# Patient Record
Sex: FEMALE | URBAN - METROPOLITAN AREA
[De-identification: names, ages, dates, MRNs, and addresses within clinical notes are randomized per-mention and may not be internally consistent; named-entity substitution may affect disease eponyms.]

---

## 2018-11-07 ENCOUNTER — HOSPITAL ENCOUNTER (OUTPATIENT)
Dept: LAB | Age: 53
Discharge: HOME | End: 2018-11-07

## 2018-11-07 LAB
COMMENT, HOLDF: NORMAL
HAV IGM SER QL: NONREACTIVE
HBV CORE IGM SER QL: NONREACTIVE
HBV SURFACE AG SER QL: <0.1 INDEX
HBV SURFACE AG SER QL: NEGATIVE
HCV AB SERPL QL IA: NONREACTIVE
HCV COMMENT,HCGAC: NORMAL
HIV 1+2 AB+HIV1 P24 AG SERPL QL IA: NONREACTIVE
HIV12 RESULT COMMENT, HHIVC: NORMAL
SAMPLES BEING HELD,HOLD: NORMAL
SP1: NORMAL
SP2: NORMAL
SP3: NORMAL

## 2018-11-07 PROCEDURE — 80074 ACUTE HEPATITIS PANEL: CPT | Performed by: INTERNAL MEDICINE

## 2018-11-07 PROCEDURE — 87389 HIV-1 AG W/HIV-1&-2 AB AG IA: CPT | Performed by: INTERNAL MEDICINE

## 2019-04-26 ENCOUNTER — HOSPITAL ENCOUNTER (OUTPATIENT)
Dept: LAB | Age: 54
Discharge: HOME | End: 2019-04-26

## 2019-04-26 PROCEDURE — 88142 CYTOPATH C/V THIN LAYER: CPT

## 2019-04-26 PROCEDURE — 87624 HPV HI-RISK TYP POOLED RSLT: CPT

## 2019-06-24 ENCOUNTER — HOSPITAL ENCOUNTER (OUTPATIENT)
Dept: LAB | Age: 54
Discharge: HOME | End: 2019-06-24

## 2019-06-24 LAB
ANION GAP SERPL CALC-SCNC: 6 MMOL/L (ref 5–15)
BUN SERPL-MCNC: 7 MG/DL (ref 6–20)
BUN/CREAT SERPL: 10 (ref 12–20)
CALCIUM SERPL-MCNC: 8.8 MG/DL (ref 8.5–10.1)
CHLORIDE SERPL-SCNC: 94 MMOL/L (ref 97–108)
CO2 SERPL-SCNC: 28 MMOL/L (ref 21–32)
CREAT SERPL-MCNC: 0.67 MG/DL (ref 0.55–1.02)
GLUCOSE SERPL-MCNC: 86 MG/DL (ref 65–100)
POTASSIUM SERPL-SCNC: 4.4 MMOL/L (ref 3.5–5.1)
SODIUM SERPL-SCNC: 128 MMOL/L (ref 136–145)

## 2019-06-24 PROCEDURE — 80048 BASIC METABOLIC PNL TOTAL CA: CPT

## 2020-09-23 ENCOUNTER — HOSPITAL ENCOUNTER (OUTPATIENT)
Dept: LAB | Age: 55
Discharge: HOME | End: 2020-09-23

## 2020-09-23 PROCEDURE — 83036 HEMOGLOBIN GLYCOSYLATED A1C: CPT

## 2020-09-23 PROCEDURE — 85025 COMPLETE CBC W/AUTO DIFF WBC: CPT

## 2020-09-23 PROCEDURE — 82043 UR ALBUMIN QUANTITATIVE: CPT

## 2020-09-23 PROCEDURE — 80053 COMPREHEN METABOLIC PANEL: CPT

## 2020-09-23 PROCEDURE — 80061 LIPID PANEL: CPT

## 2020-09-24 LAB
ALBUMIN SERPL-MCNC: 4.1 G/DL (ref 3.5–5)
ALBUMIN/GLOB SERPL: 1.2 {RATIO} (ref 1.1–2.2)
ALP SERPL-CCNC: 81 U/L (ref 45–117)
ALT SERPL-CCNC: 26 U/L (ref 12–78)
ANION GAP SERPL CALC-SCNC: 4 MMOL/L (ref 5–15)
AST SERPL-CCNC: 17 U/L (ref 15–37)
BASOPHILS # BLD: 0.1 K/UL (ref 0–0.1)
BASOPHILS NFR BLD: 1 % (ref 0–1)
BILIRUB SERPL-MCNC: 0.3 MG/DL (ref 0.2–1)
BUN SERPL-MCNC: 8 MG/DL (ref 6–20)
BUN/CREAT SERPL: 12 (ref 12–20)
CALCIUM SERPL-MCNC: 9.1 MG/DL (ref 8.5–10.1)
CHLORIDE SERPL-SCNC: 97 MMOL/L (ref 97–108)
CHOLEST SERPL-MCNC: 186 MG/DL
CO2 SERPL-SCNC: 30 MMOL/L (ref 21–32)
CREAT SERPL-MCNC: 0.67 MG/DL (ref 0.55–1.02)
CREAT UR-MCNC: 226 MG/DL
DIFFERENTIAL METHOD BLD: ABNORMAL
EOSINOPHIL # BLD: 0.1 K/UL (ref 0–0.4)
EOSINOPHIL NFR BLD: 1 % (ref 0–7)
ERYTHROCYTE [DISTWIDTH] IN BLOOD BY AUTOMATED COUNT: 13.8 % (ref 11.5–14.5)
EST. AVERAGE GLUCOSE BLD GHB EST-MCNC: 103 MG/DL
GLOBULIN SER CALC-MCNC: 3.5 G/DL (ref 2–4)
GLUCOSE SERPL-MCNC: 125 MG/DL (ref 65–100)
HBA1C MFR BLD: 5.2 % (ref 4–5.6)
HCT VFR BLD AUTO: 42.5 % (ref 35–47)
HDLC SERPL-MCNC: 78 MG/DL
HDLC SERPL: 2.4 {RATIO} (ref 0–5)
HGB BLD-MCNC: 13.7 G/DL (ref 11.5–16)
IMM GRANULOCYTES # BLD AUTO: 0 K/UL (ref 0–0.04)
IMM GRANULOCYTES NFR BLD AUTO: 0 % (ref 0–0.5)
LDLC SERPL CALC-MCNC: 92.6 MG/DL (ref 0–100)
LIPID PROFILE,FLP: NORMAL
LYMPHOCYTES # BLD: 2.2 K/UL (ref 0.8–3.5)
LYMPHOCYTES NFR BLD: 37 % (ref 12–49)
MCH RBC QN AUTO: 32.9 PG (ref 26–34)
MCHC RBC AUTO-ENTMCNC: 32.2 G/DL (ref 30–36.5)
MCV RBC AUTO: 101.9 FL (ref 80–99)
MICROALBUMIN UR-MCNC: 7.85 MG/DL
MICROALBUMIN/CREAT UR-RTO: 35 MG/G (ref 0–30)
MONOCYTES # BLD: 0.7 K/UL (ref 0–1)
MONOCYTES NFR BLD: 12 % (ref 5–13)
NEUTS SEG # BLD: 2.9 K/UL (ref 1.8–8)
NEUTS SEG NFR BLD: 49 % (ref 32–75)
NRBC # BLD: 0 K/UL (ref 0–0.01)
NRBC BLD-RTO: 0 PER 100 WBC
PLATELET # BLD AUTO: 300 K/UL (ref 150–400)
PMV BLD AUTO: 10.4 FL (ref 8.9–12.9)
POTASSIUM SERPL-SCNC: 3.8 MMOL/L (ref 3.5–5.1)
PROT SERPL-MCNC: 7.6 G/DL (ref 6.4–8.2)
RBC # BLD AUTO: 4.17 M/UL (ref 3.8–5.2)
SODIUM SERPL-SCNC: 131 MMOL/L (ref 136–145)
TRIGL SERPL-MCNC: 77 MG/DL (ref ?–150)
VLDLC SERPL CALC-MCNC: 15.4 MG/DL
WBC # BLD AUTO: 6 K/UL (ref 3.6–11)

## 2021-06-16 PROCEDURE — 85025 COMPLETE CBC W/AUTO DIFF WBC: CPT

## 2021-06-16 PROCEDURE — 82977 ASSAY OF GGT: CPT

## 2021-06-16 PROCEDURE — 80076 HEPATIC FUNCTION PANEL: CPT

## 2021-06-16 PROCEDURE — 80048 BASIC METABOLIC PNL TOTAL CA: CPT

## 2021-06-17 ENCOUNTER — HOSPITAL ENCOUNTER (OUTPATIENT)
Dept: LAB | Age: 56
Discharge: HOME | End: 2021-06-17

## 2021-06-17 LAB
ALBUMIN SERPL-MCNC: 4.6 G/DL (ref 3.5–5)
ALBUMIN/GLOB SERPL: 1.3 {RATIO} (ref 1.1–2.2)
ALP SERPL-CCNC: 112 U/L (ref 45–117)
ALT SERPL-CCNC: 24 U/L (ref 12–78)
ANION GAP SERPL CALC-SCNC: 8 MMOL/L (ref 5–15)
AST SERPL-CCNC: 25 U/L (ref 15–37)
BASOPHILS # BLD: 0.1 K/UL (ref 0–0.1)
BASOPHILS NFR BLD: 1 % (ref 0–1)
BILIRUB DIRECT SERPL-MCNC: 0.3 MG/DL (ref 0–0.2)
BILIRUB SERPL-MCNC: 0.9 MG/DL (ref 0.2–1)
BUN SERPL-MCNC: 8 MG/DL (ref 6–20)
BUN/CREAT SERPL: 15 (ref 12–20)
CALCIUM SERPL-MCNC: 10.2 MG/DL (ref 8.5–10.1)
CHLORIDE SERPL-SCNC: 91 MMOL/L (ref 97–108)
CO2 SERPL-SCNC: 30 MMOL/L (ref 21–32)
COMMENT, HOLDF: NORMAL
CREAT SERPL-MCNC: 0.53 MG/DL (ref 0.55–1.02)
DIFFERENTIAL METHOD BLD: ABNORMAL
EOSINOPHIL # BLD: 0 K/UL (ref 0–0.4)
EOSINOPHIL NFR BLD: 0 % (ref 0–7)
ERYTHROCYTE [DISTWIDTH] IN BLOOD BY AUTOMATED COUNT: 13.1 % (ref 11.5–14.5)
GGT SERPL-CCNC: 23 U/L (ref 5–55)
GLOBULIN SER CALC-MCNC: 3.6 G/DL (ref 2–4)
GLUCOSE SERPL-MCNC: 91 MG/DL (ref 65–100)
HCT VFR BLD AUTO: 45.6 % (ref 35–47)
HGB BLD-MCNC: 14.9 G/DL (ref 11.5–16)
IMM GRANULOCYTES # BLD AUTO: 0 K/UL (ref 0–0.04)
IMM GRANULOCYTES NFR BLD AUTO: 0 % (ref 0–0.5)
LYMPHOCYTES # BLD: 2.2 K/UL (ref 0.8–3.5)
LYMPHOCYTES NFR BLD: 30 % (ref 12–49)
MCH RBC QN AUTO: 33.3 PG (ref 26–34)
MCHC RBC AUTO-ENTMCNC: 32.7 G/DL (ref 30–36.5)
MCV RBC AUTO: 101.8 FL (ref 80–99)
MONOCYTES # BLD: 0.6 K/UL (ref 0–1)
MONOCYTES NFR BLD: 8 % (ref 5–13)
NEUTS SEG # BLD: 4.4 K/UL (ref 1.8–8)
NEUTS SEG NFR BLD: 61 % (ref 32–75)
NRBC # BLD: 0 K/UL (ref 0–0.01)
NRBC BLD-RTO: 0 PER 100 WBC
PLATELET # BLD AUTO: 308 K/UL (ref 150–400)
PMV BLD AUTO: 11.5 FL (ref 8.9–12.9)
POTASSIUM SERPL-SCNC: 4.5 MMOL/L (ref 3.5–5.1)
PROT SERPL-MCNC: 8.2 G/DL (ref 6.4–8.2)
RBC # BLD AUTO: 4.48 M/UL (ref 3.8–5.2)
SAMPLES BEING HELD,HOLD: NORMAL
SODIUM SERPL-SCNC: 129 MMOL/L (ref 136–145)
WBC # BLD AUTO: 7.3 K/UL (ref 3.6–11)

## 2021-09-20 ENCOUNTER — HOSPITAL ENCOUNTER (OUTPATIENT)
Dept: LAB | Age: 56
Discharge: HOME | End: 2021-09-20

## 2021-09-20 PROCEDURE — 83036 HEMOGLOBIN GLYCOSYLATED A1C: CPT

## 2021-09-20 PROCEDURE — 80061 LIPID PANEL: CPT

## 2021-09-20 PROCEDURE — 82570 ASSAY OF URINE CREATININE: CPT

## 2021-09-20 PROCEDURE — 80048 BASIC METABOLIC PNL TOTAL CA: CPT

## 2021-09-21 LAB
ANION GAP SERPL CALC-SCNC: 4 MMOL/L (ref 5–15)
BUN SERPL-MCNC: 9 MG/DL (ref 6–20)
BUN/CREAT SERPL: 13 (ref 12–20)
CALCIUM SERPL-MCNC: 9.3 MG/DL (ref 8.5–10.1)
CHLORIDE SERPL-SCNC: 94 MMOL/L (ref 97–108)
CHOLEST SERPL-MCNC: 195 MG/DL
CO2 SERPL-SCNC: 28 MMOL/L (ref 21–32)
COMMENT, HOLDF: NORMAL
CREAT SERPL-MCNC: 0.69 MG/DL (ref 0.55–1.02)
CREAT UR-MCNC: 151 MG/DL
EST. AVERAGE GLUCOSE BLD GHB EST-MCNC: 108 MG/DL
GLUCOSE SERPL-MCNC: 125 MG/DL (ref 65–100)
HBA1C MFR BLD: 5.4 % (ref 4–5.6)
HDLC SERPL-MCNC: 96 MG/DL
HDLC SERPL: 2 {RATIO} (ref 0–5)
LDLC SERPL CALC-MCNC: 87.2 MG/DL (ref 0–100)
MICROALBUMIN UR-MCNC: 4.09 MG/DL
MICROALBUMIN/CREAT UR-RTO: 27 MG/G (ref 0–30)
POTASSIUM SERPL-SCNC: 4 MMOL/L (ref 3.5–5.1)
SAMPLES BEING HELD,HOLD: NORMAL
SODIUM SERPL-SCNC: 126 MMOL/L (ref 136–145)
TRIGL SERPL-MCNC: 59 MG/DL (ref ?–150)
VLDLC SERPL CALC-MCNC: 11.8 MG/DL

## 2021-10-25 ENCOUNTER — HOSPITAL ENCOUNTER (OUTPATIENT)
Dept: LAB | Age: 56
Discharge: HOME | End: 2021-10-25

## 2021-10-25 LAB
ALBUMIN SERPL-MCNC: 4.5 G/DL (ref 3.5–5)
ALBUMIN/GLOB SERPL: 1.1 {RATIO} (ref 1.1–2.2)
ALP SERPL-CCNC: 137 U/L (ref 45–117)
ALT SERPL-CCNC: 25 U/L (ref 12–78)
ANION GAP SERPL CALC-SCNC: 7 MMOL/L (ref 5–15)
AST SERPL-CCNC: 28 U/L (ref 15–37)
BILIRUB DIRECT SERPL-MCNC: 0.3 MG/DL (ref 0–0.2)
BILIRUB SERPL-MCNC: 0.9 MG/DL (ref 0.2–1)
BUN SERPL-MCNC: 7 MG/DL (ref 6–20)
BUN/CREAT SERPL: 11 (ref 12–20)
CALCIUM SERPL-MCNC: 9.6 MG/DL (ref 8.5–10.1)
CHLORIDE SERPL-SCNC: 89 MMOL/L (ref 97–108)
CO2 SERPL-SCNC: 28 MMOL/L (ref 21–32)
COMMENT, HOLDF: NORMAL
CORTIS SERPL-MCNC: 17.4 UG/DL
CREAT SERPL-MCNC: 0.61 MG/DL (ref 0.55–1.02)
GLOBULIN SER CALC-MCNC: 4 G/DL (ref 2–4)
GLUCOSE SERPL-MCNC: 113 MG/DL (ref 65–100)
OSMOLALITY UR: 461 MOSM/KG H2O
POTASSIUM SERPL-SCNC: 4.1 MMOL/L (ref 3.5–5.1)
PROT SERPL-MCNC: 8.5 G/DL (ref 6.4–8.2)
SAMPLES BEING HELD,HOLD: NORMAL
SODIUM SERPL-SCNC: 124 MMOL/L (ref 136–145)
SODIUM UR-SCNC: 113 MMOL/L
TSH SERPL DL<=0.05 MIU/L-ACNC: 0.84 UIU/ML (ref 0.36–3.74)

## 2021-10-25 PROCEDURE — 80076 HEPATIC FUNCTION PANEL: CPT

## 2021-10-25 PROCEDURE — 84443 ASSAY THYROID STIM HORMONE: CPT

## 2021-10-25 PROCEDURE — 83935 ASSAY OF URINE OSMOLALITY: CPT

## 2021-10-25 PROCEDURE — 80048 BASIC METABOLIC PNL TOTAL CA: CPT

## 2021-10-25 PROCEDURE — 84300 ASSAY OF URINE SODIUM: CPT

## 2021-10-25 PROCEDURE — 82533 TOTAL CORTISOL: CPT

## 2022-01-20 ENCOUNTER — HOSPITAL ENCOUNTER (OUTPATIENT)
Dept: LAB | Age: 57
Discharge: HOME | End: 2022-01-20

## 2022-01-20 LAB
ANION GAP SERPL CALC-SCNC: 4 MMOL/L (ref 5–15)
BUN SERPL-MCNC: 5 MG/DL (ref 6–20)
BUN/CREAT SERPL: 11 (ref 12–20)
CALCIUM SERPL-MCNC: 9.1 MG/DL (ref 8.5–10.1)
CHLORIDE SERPL-SCNC: 97 MMOL/L (ref 97–108)
CO2 SERPL-SCNC: 29 MMOL/L (ref 21–32)
CREAT SERPL-MCNC: 0.44 MG/DL (ref 0.55–1.02)
GLUCOSE SERPL-MCNC: 90 MG/DL (ref 65–100)
POTASSIUM SERPL-SCNC: 3.9 MMOL/L (ref 3.5–5.1)
SODIUM SERPL-SCNC: 130 MMOL/L (ref 136–145)

## 2022-01-20 PROCEDURE — 80048 BASIC METABOLIC PNL TOTAL CA: CPT

## 2023-02-06 ENCOUNTER — HOSPITAL ENCOUNTER (INPATIENT)
Age: 58
LOS: 14 days | Discharge: HOME | DRG: 190 | End: 2023-02-21
Attending: EMERGENCY MEDICINE | Admitting: STUDENT IN AN ORGANIZED HEALTH CARE EDUCATION/TRAINING PROGRAM

## 2023-02-06 ENCOUNTER — APPOINTMENT (OUTPATIENT)
Dept: GENERAL RADIOLOGY | Age: 58
DRG: 190 | End: 2023-02-06
Attending: EMERGENCY MEDICINE

## 2023-02-06 DIAGNOSIS — E87.1 HYPONATREMIA: ICD-10-CM

## 2023-02-06 DIAGNOSIS — J44.1 CHRONIC OBSTRUCTIVE PULMONARY DISEASE WITH ACUTE EXACERBATION (HCC): Primary | ICD-10-CM

## 2023-02-06 LAB
ALBUMIN SERPL-MCNC: 3 G/DL (ref 3.5–5)
ALBUMIN/GLOB SERPL: 0.9 (ref 1.1–2.2)
ALP SERPL-CCNC: 132 U/L (ref 45–117)
ALT SERPL-CCNC: 28 U/L (ref 12–78)
ANION GAP SERPL CALC-SCNC: 5 MMOL/L (ref 5–15)
AST SERPL-CCNC: 37 U/L (ref 15–37)
BASOPHILS # BLD: 0.1 K/UL (ref 0–0.1)
BASOPHILS NFR BLD: 2 % (ref 0–1)
BILIRUB SERPL-MCNC: 0.8 MG/DL (ref 0.2–1)
BNP SERPL-MCNC: 1160 PG/ML
BUN SERPL-MCNC: 5 MG/DL (ref 6–20)
BUN/CREAT SERPL: 12 (ref 12–20)
CALCIUM SERPL-MCNC: 8.3 MG/DL (ref 8.5–10.1)
CHLORIDE SERPL-SCNC: 80 MMOL/L (ref 97–108)
CO2 SERPL-SCNC: 33 MMOL/L (ref 21–32)
COMMENT, HOLDF: NORMAL
CREAT SERPL-MCNC: 0.42 MG/DL (ref 0.55–1.02)
DIFFERENTIAL METHOD BLD: ABNORMAL
EOSINOPHIL # BLD: 0.1 K/UL (ref 0–0.4)
EOSINOPHIL NFR BLD: 2 % (ref 0–7)
ERYTHROCYTE [DISTWIDTH] IN BLOOD BY AUTOMATED COUNT: 13.9 % (ref 11.5–14.5)
GLOBULIN SER CALC-MCNC: 3.5 G/DL (ref 2–4)
GLUCOSE SERPL-MCNC: 86 MG/DL (ref 65–100)
HCT VFR BLD AUTO: 39.7 % (ref 35–47)
HGB BLD-MCNC: 13.8 G/DL (ref 11.5–16)
IMM GRANULOCYTES # BLD AUTO: 0 K/UL (ref 0–0.04)
IMM GRANULOCYTES NFR BLD AUTO: 0 % (ref 0–0.5)
LYMPHOCYTES # BLD: 0.8 K/UL (ref 0.8–3.5)
LYMPHOCYTES NFR BLD: 23 % (ref 12–49)
MCH RBC QN AUTO: 32.1 PG (ref 26–34)
MCHC RBC AUTO-ENTMCNC: 34.8 G/DL (ref 30–36.5)
MCV RBC AUTO: 92.3 FL (ref 80–99)
MONOCYTES # BLD: 0.3 K/UL (ref 0–1)
MONOCYTES NFR BLD: 8 % (ref 5–13)
NEUTS BAND NFR BLD MANUAL: 2 %
NEUTS SEG # BLD: 2.1 K/UL (ref 1.8–8)
NEUTS SEG NFR BLD: 63 % (ref 32–75)
NRBC # BLD: 0 K/UL (ref 0–0.01)
NRBC BLD-RTO: 0 PER 100 WBC
PLATELET # BLD AUTO: 147 K/UL (ref 150–400)
PMV BLD AUTO: 9.8 FL (ref 8.9–12.9)
POTASSIUM SERPL-SCNC: 3.5 MMOL/L (ref 3.5–5.1)
PROT SERPL-MCNC: 6.5 G/DL (ref 6.4–8.2)
RBC # BLD AUTO: 4.3 M/UL (ref 3.8–5.2)
RBC MORPH BLD: ABNORMAL
SAMPLES BEING HELD,HOLD: NORMAL
SODIUM SERPL-SCNC: 118 MMOL/L (ref 136–145)
TROPONIN I SERPL HS-MCNC: 12 NG/L (ref 0–51)
WBC # BLD AUTO: 3.4 K/UL (ref 3.6–11)

## 2023-02-06 PROCEDURE — 71045 X-RAY EXAM CHEST 1 VIEW: CPT

## 2023-02-06 PROCEDURE — 96374 THER/PROPH/DIAG INJ IV PUSH: CPT

## 2023-02-06 PROCEDURE — 80053 COMPREHEN METABOLIC PANEL: CPT

## 2023-02-06 PROCEDURE — 36415 COLL VENOUS BLD VENIPUNCTURE: CPT

## 2023-02-06 PROCEDURE — 94761 N-INVAS EAR/PLS OXIMETRY MLT: CPT

## 2023-02-06 PROCEDURE — 74011250636 HC RX REV CODE- 250/636

## 2023-02-06 PROCEDURE — 99285 EMERGENCY DEPT VISIT HI MDM: CPT

## 2023-02-06 PROCEDURE — 85025 COMPLETE CBC W/AUTO DIFF WBC: CPT

## 2023-02-06 PROCEDURE — 96375 TX/PRO/DX INJ NEW DRUG ADDON: CPT

## 2023-02-06 PROCEDURE — 93005 ELECTROCARDIOGRAM TRACING: CPT

## 2023-02-06 PROCEDURE — 84484 ASSAY OF TROPONIN QUANT: CPT

## 2023-02-06 PROCEDURE — 96361 HYDRATE IV INFUSION ADD-ON: CPT

## 2023-02-06 PROCEDURE — 83880 ASSAY OF NATRIURETIC PEPTIDE: CPT

## 2023-02-06 PROCEDURE — 94640 AIRWAY INHALATION TREATMENT: CPT

## 2023-02-06 PROCEDURE — 74011000250 HC RX REV CODE- 250

## 2023-02-06 RX ORDER — LORAZEPAM 2 MG/ML
1 INJECTION INTRAMUSCULAR
Status: COMPLETED | OUTPATIENT
Start: 2023-02-06 | End: 2023-02-06

## 2023-02-06 RX ORDER — IPRATROPIUM BROMIDE AND ALBUTEROL SULFATE 2.5; .5 MG/3ML; MG/3ML
10 SOLUTION RESPIRATORY (INHALATION)
Status: COMPLETED | OUTPATIENT
Start: 2023-02-06 | End: 2023-02-06

## 2023-02-06 RX ADMIN — LORAZEPAM 1 MG: 2 INJECTION INTRAMUSCULAR; INTRAVENOUS at 23:51

## 2023-02-06 RX ADMIN — IPRATROPIUM BROMIDE AND ALBUTEROL SULFATE 10 ML: 2.5; .5 SOLUTION RESPIRATORY (INHALATION) at 22:45

## 2023-02-06 RX ADMIN — SODIUM CHLORIDE 1000 ML: 9 INJECTION, SOLUTION INTRAVENOUS at 23:36

## 2023-02-06 RX ADMIN — METHYLPREDNISOLONE SODIUM SUCCINATE 125 MG: 125 INJECTION, POWDER, FOR SOLUTION INTRAMUSCULAR; INTRAVENOUS at 22:44

## 2023-02-06 NOTE — Clinical Note
Patient Class[de-identified] OBSERVATION [104]   Type of Bed: Telemetry [19]   Cardiac Monitoring Required?: Yes   Reason for Observation: acute respiratory distress   Admitting Diagnosis: Acute respiratory distress [802374]   Admitting Physician: Sera Bullock [[id number]]   Attending Physician: Ines Murdock

## 2023-02-07 ENCOUNTER — APPOINTMENT (OUTPATIENT)
Dept: CT IMAGING | Age: 58
DRG: 190 | End: 2023-02-07
Attending: INTERNAL MEDICINE

## 2023-02-07 PROBLEM — E87.1 HYPONATREMIA: Status: ACTIVE | Noted: 2023-02-07

## 2023-02-07 PROBLEM — R06.03 ACUTE RESPIRATORY DISTRESS: Status: ACTIVE | Noted: 2023-02-07

## 2023-02-07 LAB
ANION GAP SERPL CALC-SCNC: 6 MMOL/L (ref 5–15)
ANION GAP SERPL CALC-SCNC: 6 MMOL/L (ref 5–15)
ATRIAL RATE: 95 BPM
BUN SERPL-MCNC: 5 MG/DL (ref 6–20)
BUN SERPL-MCNC: 6 MG/DL (ref 6–20)
BUN/CREAT SERPL: 11 (ref 12–20)
BUN/CREAT SERPL: 9 (ref 12–20)
CALCIUM SERPL-MCNC: 7.4 MG/DL (ref 8.5–10.1)
CALCIUM SERPL-MCNC: 7.9 MG/DL (ref 8.5–10.1)
CALCULATED P AXIS, ECG09: 71 DEGREES
CALCULATED R AXIS, ECG10: -154 DEGREES
CALCULATED T AXIS, ECG11: 71 DEGREES
CHLORIDE SERPL-SCNC: 87 MMOL/L (ref 97–108)
CHLORIDE SERPL-SCNC: 89 MMOL/L (ref 97–108)
CHLORIDE UR-SCNC: 19 MMOL/L
CO2 SERPL-SCNC: 28 MMOL/L (ref 21–32)
CO2 SERPL-SCNC: 29 MMOL/L (ref 21–32)
CREAT SERPL-MCNC: 0.53 MG/DL (ref 0.55–1.02)
CREAT SERPL-MCNC: 0.54 MG/DL (ref 0.55–1.02)
CREAT UR-MCNC: 39.1 MG/DL
DIAGNOSIS, 93000: NORMAL
GLUCOSE SERPL-MCNC: 162 MG/DL (ref 65–100)
GLUCOSE SERPL-MCNC: 164 MG/DL (ref 65–100)
MAGNESIUM SERPL-MCNC: 1.7 MG/DL (ref 1.6–2.4)
MAGNESIUM SERPL-MCNC: 1.7 MG/DL (ref 1.6–2.4)
OSMOLALITY SERPL: 256 MOSM/KG H2O
OSMOLALITY UR: 163 MOSM/KG H2O
P-R INTERVAL, ECG05: 184 MS
PHOSPHATE SERPL-MCNC: 3.1 MG/DL (ref 2.6–4.7)
PHOSPHATE SERPL-MCNC: 3.2 MG/DL (ref 2.6–4.7)
POTASSIUM SERPL-SCNC: 3.6 MMOL/L (ref 3.5–5.1)
POTASSIUM SERPL-SCNC: 4.2 MMOL/L (ref 3.5–5.1)
POTASSIUM UR-SCNC: 30 MMOL/L
Q-T INTERVAL, ECG07: 364 MS
QRS DURATION, ECG06: 76 MS
QTC CALCULATION (BEZET), ECG08: 457 MS
SODIUM SERPL-SCNC: 121 MMOL/L (ref 136–145)
SODIUM SERPL-SCNC: 124 MMOL/L (ref 136–145)
SODIUM UR-SCNC: 9 MMOL/L
TROPONIN I SERPL HS-MCNC: 8 NG/L (ref 0–51)
VENTRICULAR RATE, ECG03: 95 BPM

## 2023-02-07 PROCEDURE — 74011000636 HC RX REV CODE- 636: Performed by: INTERNAL MEDICINE

## 2023-02-07 PROCEDURE — 80048 BASIC METABOLIC PNL TOTAL CA: CPT

## 2023-02-07 PROCEDURE — 82570 ASSAY OF URINE CREATININE: CPT

## 2023-02-07 PROCEDURE — 94640 AIRWAY INHALATION TREATMENT: CPT

## 2023-02-07 PROCEDURE — 74011250636 HC RX REV CODE- 250/636: Performed by: NURSE PRACTITIONER

## 2023-02-07 PROCEDURE — 84100 ASSAY OF PHOSPHORUS: CPT

## 2023-02-07 PROCEDURE — 82436 ASSAY OF URINE CHLORIDE: CPT

## 2023-02-07 PROCEDURE — 84484 ASSAY OF TROPONIN QUANT: CPT

## 2023-02-07 PROCEDURE — 74011000250 HC RX REV CODE- 250: Performed by: NURSE PRACTITIONER

## 2023-02-07 PROCEDURE — 74011250637 HC RX REV CODE- 250/637: Performed by: NURSE PRACTITIONER

## 2023-02-07 PROCEDURE — 65270000046 HC RM TELEMETRY

## 2023-02-07 PROCEDURE — 36415 COLL VENOUS BLD VENIPUNCTURE: CPT

## 2023-02-07 PROCEDURE — 71275 CT ANGIOGRAPHY CHEST: CPT

## 2023-02-07 PROCEDURE — 83930 ASSAY OF BLOOD OSMOLALITY: CPT

## 2023-02-07 PROCEDURE — 83735 ASSAY OF MAGNESIUM: CPT

## 2023-02-07 PROCEDURE — 84300 ASSAY OF URINE SODIUM: CPT

## 2023-02-07 PROCEDURE — 83935 ASSAY OF URINE OSMOLALITY: CPT

## 2023-02-07 PROCEDURE — 84133 ASSAY OF URINE POTASSIUM: CPT

## 2023-02-07 RX ORDER — ACETAMINOPHEN 325 MG/1
650 TABLET ORAL
Status: DISCONTINUED | OUTPATIENT
Start: 2023-02-07 | End: 2023-02-21 | Stop reason: HOSPADM

## 2023-02-07 RX ORDER — LORAZEPAM 2 MG/ML
1 INJECTION INTRAMUSCULAR
Status: DISCONTINUED | OUTPATIENT
Start: 2023-02-07 | End: 2023-02-21 | Stop reason: HOSPADM

## 2023-02-07 RX ORDER — ONDANSETRON 2 MG/ML
4 INJECTION INTRAMUSCULAR; INTRAVENOUS
Status: DISCONTINUED | OUTPATIENT
Start: 2023-02-07 | End: 2023-02-21 | Stop reason: HOSPADM

## 2023-02-07 RX ORDER — BALSAM PERU/CASTOR OIL
OINTMENT (GRAM) TOPICAL 2 TIMES DAILY
Status: DISCONTINUED | OUTPATIENT
Start: 2023-02-07 | End: 2023-02-21 | Stop reason: HOSPADM

## 2023-02-07 RX ORDER — IPRATROPIUM BROMIDE 0.5 MG/2.5ML
0.5 SOLUTION RESPIRATORY (INHALATION)
Status: DISCONTINUED | OUTPATIENT
Start: 2023-02-07 | End: 2023-02-21 | Stop reason: HOSPADM

## 2023-02-07 RX ORDER — ACETAMINOPHEN 650 MG/1
650 SUPPOSITORY RECTAL
Status: DISCONTINUED | OUTPATIENT
Start: 2023-02-07 | End: 2023-02-21 | Stop reason: HOSPADM

## 2023-02-07 RX ORDER — ENOXAPARIN SODIUM 100 MG/ML
40 INJECTION SUBCUTANEOUS DAILY
Status: DISCONTINUED | OUTPATIENT
Start: 2023-02-07 | End: 2023-02-11

## 2023-02-07 RX ORDER — LORAZEPAM 2 MG/ML
2 INJECTION INTRAMUSCULAR
Status: DISCONTINUED | OUTPATIENT
Start: 2023-02-07 | End: 2023-02-21 | Stop reason: HOSPADM

## 2023-02-07 RX ORDER — POLYETHYLENE GLYCOL 3350 17 G/17G
17 POWDER, FOR SOLUTION ORAL DAILY PRN
Status: DISCONTINUED | OUTPATIENT
Start: 2023-02-07 | End: 2023-02-21 | Stop reason: HOSPADM

## 2023-02-07 RX ORDER — SODIUM CHLORIDE 0.9 % (FLUSH) 0.9 %
5-40 SYRINGE (ML) INJECTION EVERY 8 HOURS
Status: DISCONTINUED | OUTPATIENT
Start: 2023-02-07 | End: 2023-02-21 | Stop reason: HOSPADM

## 2023-02-07 RX ORDER — ONDANSETRON 4 MG/1
4 TABLET, ORALLY DISINTEGRATING ORAL
Status: DISCONTINUED | OUTPATIENT
Start: 2023-02-07 | End: 2023-02-21 | Stop reason: HOSPADM

## 2023-02-07 RX ORDER — SODIUM CHLORIDE 0.9 % (FLUSH) 0.9 %
5-40 SYRINGE (ML) INJECTION AS NEEDED
Status: DISCONTINUED | OUTPATIENT
Start: 2023-02-07 | End: 2023-02-21 | Stop reason: HOSPADM

## 2023-02-07 RX ORDER — LEVALBUTEROL INHALATION SOLUTION 1.25 MG/3ML
1.25 SOLUTION RESPIRATORY (INHALATION)
Status: DISCONTINUED | OUTPATIENT
Start: 2023-02-07 | End: 2023-02-14

## 2023-02-07 RX ORDER — IBUPROFEN 200 MG
1 TABLET ORAL DAILY
Status: DISCONTINUED | OUTPATIENT
Start: 2023-02-07 | End: 2023-02-21 | Stop reason: HOSPADM

## 2023-02-07 RX ADMIN — LORAZEPAM 1 MG: 2 INJECTION INTRAMUSCULAR at 11:55

## 2023-02-07 RX ADMIN — IOPAMIDOL 100 ML: 755 INJECTION, SOLUTION INTRAVENOUS at 09:38

## 2023-02-07 RX ADMIN — ENOXAPARIN SODIUM 40 MG: 100 INJECTION SUBCUTANEOUS at 10:08

## 2023-02-07 RX ADMIN — LEVALBUTEROL HYDROCHLORIDE 1.25 MG: 1.25 SOLUTION RESPIRATORY (INHALATION) at 22:39

## 2023-02-07 RX ADMIN — LORAZEPAM 2 MG: 2 INJECTION INTRAMUSCULAR at 22:28

## 2023-02-07 RX ADMIN — SODIUM CHLORIDE, PRESERVATIVE FREE 10 ML: 5 INJECTION INTRAVENOUS at 22:28

## 2023-02-07 RX ADMIN — SODIUM CHLORIDE, PRESERVATIVE FREE 10 ML: 5 INJECTION INTRAVENOUS at 14:00

## 2023-02-07 NOTE — H&P
Hospitalist Admission Note    NAME: Nimesh Rangel   :  1965   MRN:  [id number]     Date/Time:  2023 12:51 AM    Patient PCP: None  ______________________________________________________________________  Given the patient's current clinical presentation, I have a high level of concern for decompensation if discharged from the emergency department. Complex decision making was performed, which includes reviewing the patient's available past medical records, laboratory results, and x-ray films. Discussed assessment of this patient's clinical condition and plan of care with Dr. Blue Maynard which is as follows. Assessment / Plan:    Acute respiratory distress  COPD ( not in exacerbation)  CXR: No acute process  ECG: Normal sinus rhythm. Right superior axis deviation   Anterior infarct , age undetermined  Pro BNP: 1,160  - O2 and neb treatment PRN    Hyponatremia  - monitor Na q 4 hours x 4 occurences  - check serum osm  - Nephrology consult    Nicotine abuse  - Nicotine patch q daily  - smoking cessation education    Alcohol abuse  - CIWA protocol      Code Status: Full  Surrogate Decision Maker:    DVT Prophylaxis: Lovenox  GI Prophylaxis: not indicated    Baseline:       Subjective:   CHIEF COMPLAINT: Acute SOB    HISTORY OF PRESENT ILLNESS:     Nimesh Rangel is a 61 y.o.  female who presents with SOB that started around 3 pm. EMS was called and per EMS report, patient was drinking and smoking when they arrived. Documented O2 sat was 89%% on RA and she was placed on NC 2 L. Patient denied any other symptoms including fever, chills, headache, CP, nausea, vomiting, bowel and bladder habit changes. Patient has history of COPD and was unable to have recent medication refill including her inhaler. She was also trying to cut down on her alcohol intake but reportedly still drinks 2 40 oz beer on a daily basis.  Patient has just received Ativan per CIWA protocol close to the time of assessment and was too sleepy to answer assessment questions. Most of the information was taken form ED and EMS documentation. We were asked to admit for work up and evaluation of the above problems. Past medical History  COPD  ETOH abuse  Nicotine abuse    No past surgical history on file. Social History     Tobacco Use    Smoking status: Not on file    Smokeless tobacco: Not on file   Substance Use Topics    Alcohol use: Not on file        No family history on file. No Known Allergies     Prior to Admission medications    Not on File       REVIEW OF SYSTEMS:     I am not able to complete the review of systems because:    The patient is intubated and sedated   Y The patient has altered mental status due to his acute medical problems    The patient has baseline aphasia from prior stroke(s)    The patient has baseline dementia and is not reliable historian    The patient is in acute medical distress and unable to provide information           Total of 12 systems reviewed as follows:       POSITIVE= bolded text  Negative = text not bolded  General:  fever, chills, sweats, generalized weakness, weight loss/gain,      loss of appetite   Eyes:    blurred vision, eye pain, loss of vision, double vision  ENT:    rhinorrhea, pharyngitis   Respiratory:   cough, sputum production, SOB, JARAMILLO, wheezing, pleuritic pain   Cardiology:   chest pain, palpitations, orthopnea, PND, edema, syncope   Gastrointestinal:  abdominal pain , N/V, diarrhea, dysphagia, constipation, bleeding   Genitourinary:  frequency, urgency, dysuria, hematuria, incontinence   Muskuloskeletal :  arthralgia, myalgia, back pain  Hematology:  easy bruising, nose or gum bleeding, lymphadenopathy   Dermatological: rash, ulceration, pruritis, color change / jaundice  Endocrine:   hot flashes or polydipsia   Neurological:  headache, dizziness, confusion, focal weakness, paresthesia,     Speech difficulties, memory loss, gait difficulty  Psychological: Feelings of anxiety, depression, agitation    Objective:   VITALS:    Visit Vitals  BP (!) 94/55   Pulse (!) 106   Temp 97.9 °F (36.6 °C)   Resp 17   Ht 5' 3\" (1.6 m)   Wt 54 kg (119 lb)   SpO2 91%   BMI 21.08 kg/m²       PHYSICAL EXAM:    General:    Drowsy (recent Ativan for CIWA score), no distress, appears older than stated age. HEENT: Atraumatic, anicteric sclerae, pink conjunctivae     No oral ulcers, mucosa moist, throat clear, dentition fair  Neck:  Supple, symmetrical,  thyroid: non tender  Lungs:   Clear to auscultation bilaterally. No Wheezing or Rhonchi. Scattered rales. Chest wall:  No tenderness  No Accessory muscle use. Heart:   Regular  rhythm,  No  murmur   No edema  Abdomen:   Soft, non-tender. Not distended. Bowel sounds normal  Extremities: No cyanosis. No clubbing,  +2 peripheral LE edema    Skin turgor normal, Capillary refill normal, Radial dial pulse 2+  Skin:     Not pale. Not Jaundiced  No rashes   Psych:  Unable to assess  Neurologic: Unable to assess    _______________________________________________________________________  Care Plan discussed with:    Comments   Patient y    Family      RN y    Care Manager                    Consultant:      _______________________________________________________________________  Expected  Disposition:   Home with Family y   HH/PT/OT/RN    SNF/LTC    FAITH    ______________________________________________________      Comments    y Reviewed previous records   >50% of visit spent in counseling and coordination of care y Discussion with patient and/or family and questions answered       ________________________________________________________________________  Signed: Darion Shannon NP    Procedures: see electronic medical records for all procedures/Xrays and details which were not copied into this note but were reviewed prior to creation of Plan.     LAB DATA REVIEWED:    Recent Results (from the past 24 hour(s))   EKG, 12 LEAD, INITIAL    Collection Time: 02/06/23  9:58 PM   Result Value Ref Range    Ventricular Rate 95 BPM    Atrial Rate 95 BPM    P-R Interval 184 ms    QRS Duration 76 ms    Q-T Interval 364 ms    QTC Calculation (Bezet) 457 ms    Calculated P Axis 71 degrees    Calculated R Axis -154 degrees    Calculated T Axis 71 degrees    Diagnosis       Normal sinus rhythm  Right superior axis deviation  Anterior infarct , age undetermined  No previous ECGs available     CBC WITH AUTOMATED DIFF    Collection Time: 02/06/23 10:09 PM   Result Value Ref Range    WBC 3.4 (L) 3.6 - 11.0 K/uL    RBC 4.30 3.80 - 5.20 M/uL    HGB 13.8 11.5 - 16.0 g/dL    HCT 39.7 35.0 - 47.0 %    MCV 92.3 80.0 - 99.0 FL    MCH 32.1 26.0 - 34.0 PG    MCHC 34.8 30.0 - 36.5 g/dL    RDW 13.9 11.5 - 14.5 %    PLATELET 328 (L) 818 - 400 K/uL    MPV 9.8 8.9 - 12.9 FL    NRBC 0.0 0  WBC    ABSOLUTE NRBC 0.00 0.00 - 0.01 K/uL    NEUTROPHILS 63 32 - 75 %    BAND NEUTROPHILS 2 %    LYMPHOCYTES 23 12 - 49 %    MONOCYTES 8 5 - 13 %    EOSINOPHILS 2 0 - 7 %    BASOPHILS 2 (H) 0 - 1 %    IMMATURE GRANULOCYTES 0 0.0 - 0.5 %    ABS. NEUTROPHILS 2.1 1.8 - 8.0 K/UL    ABS. LYMPHOCYTES 0.8 0.8 - 3.5 K/UL    ABS. MONOCYTES 0.3 0.0 - 1.0 K/UL    ABS. EOSINOPHILS 0.1 0.0 - 0.4 K/UL    ABS. BASOPHILS 0.1 0.0 - 0.1 K/UL    ABS. IMM.  GRANS. 0.0 0.00 - 0.04 K/UL    DF MANUAL      RBC COMMENTS NORMOCYTIC, NORMOCHROMIC     METABOLIC PANEL, COMPREHENSIVE    Collection Time: 02/06/23 10:09 PM   Result Value Ref Range    Sodium 118 (LL) 136 - 145 mmol/L    Potassium 3.5 3.5 - 5.1 mmol/L    Chloride 80 (L) 97 - 108 mmol/L    CO2 33 (H) 21 - 32 mmol/L    Anion gap 5 5 - 15 mmol/L    Glucose 86 65 - 100 mg/dL    BUN 5 (L) 6 - 20 MG/DL    Creatinine 0.42 (L) 0.55 - 1.02 MG/DL    BUN/Creatinine ratio 12 12 - 20      eGFR >60 >60 ml/min/1.73m2    Calcium 8.3 (L) 8.5 - 10.1 MG/DL    Bilirubin, total 0.8 0.2 - 1.0 MG/DL    ALT (SGPT) 28 12 - 78 U/L    AST (SGOT) 37 15 - 37 U/L Alk. phosphatase 132 (H) 45 - 117 U/L    Protein, total 6.5 6.4 - 8.2 g/dL    Albumin 3.0 (L) 3.5 - 5.0 g/dL    Globulin 3.5 2.0 - 4.0 g/dL    A-G Ratio 0.9 (L) 1.1 - 2.2     TROPONIN-HIGH SENSITIVITY    Collection Time: 02/06/23 10:09 PM   Result Value Ref Range    Troponin-High Sensitivity 12 0 - 51 ng/L   NT-PRO BNP    Collection Time: 02/06/23 10:09 PM   Result Value Ref Range    NT pro-BNP 1,160 (H) <125 PG/ML   SAMPLES BEING HELD    Collection Time: 02/06/23 10:09 PM   Result Value Ref Range    SAMPLES BEING HELD 1 BLUE     COMMENT        Add-on orders for these samples will be processed based on acceptable specimen integrity and analyte stability, which may vary by analyte.

## 2023-02-07 NOTE — PROGRESS NOTES
Incontinence care provided. Patient taken to [facility] with tele box.

## 2023-02-07 NOTE — PROGRESS NOTES
Patient seen and examined today. Patient was admitted by night hospitalist team earlier this morning    Hyponatremia  Acute alcoholism, concern for withdrawal  Shortness of breath    Check CTA chest  On alcohol withdrawal protocol  Sodium is improving. Renal consulted.   Check labs in a.m. tomorrow

## 2023-02-07 NOTE — PROGRESS NOTES
Transition of Care Plan:    RUR:6% low risk for readmission   Disposition: Home   If SNF or IPR: Date FOC offered:  Date FOC received:  Date authorization started with reference number:  Date authorization received and expires:  Accepting facility:  Follow up appointments: PCP, Specialists if indicated  DME needed: Potential need for O2 challenge  Transportation at Discharge: Friend vs need for medical transport - [address] (pt could not recall today)  Keys or means to access home: Has access to home         Medicare Letter: N/A   Is patient a [place] and connected with the [place]? N/A              If yes, was [employer] transfer form completed and [facility] notified? Caregiver Contact: Pt's friend - Denise Anderson [phone]  Discharge Caregiver contacted prior to discharge? To be contacted if pt wishes    Care Conference needed?:   No                Reason for Admission:  Acute respiratory distress, COPD, hyponatremia                      RUR Score:  6% low risk for readmission                    Plan for utilizing home health:  No plans for home health at this time         PCP: First and Last name:  None      Name of Practice:    Are you a current patient: Yes/No:    Approximate date of last visit:    Can you participate in a virtual visit with your PCP:                     Current Advanced Directive/Advance Care Plan: Full Code  Advance Care Planning     Healthcare Decision Maker: Pt does not have advanced medical directive. AMD education provided and advised of opportunities for completion during hospitalization if desired. Today we discussed Healthcare Decision Makers. The patient is considering options. Transition of Care Plan:    Home    Initial note: CM reviewed chart. CM completed assessment with pt at bedside. CM introduced self, role of CM, verified demographics, and discussed transition of care planning. Pt independent at baseline, uses no O2, uses inhaler for COPD.  Pt resides with her mother, who she reports is handicapped. She states that she is her mother's caregiver, and that her mother is currently at [facility]. She asked CM to contact this facility as they were planning to discharge her mother today. CM obtained information and placed call to discharge planner at [facility]. No return call to date. Pt cannot remember home address at this time, states home is off of [address] and address on file is incorrect. Will need to confirm. She states that they will only reside in this house until March, as home was in [facility] and has been auctioned. Pt reports high stress levels which are contributing to increased alcohol consumption. Pt does not drive, reports depending on her close friend [facility] [employer] for transportation needs. Pt previously following with provider at [facility], but states that she has not seen this doctor in some time. Pt has no insurance, is agreeable to Medicaid screening. Screening request emailed to [address] [place] [address]. Care management will continue to be available to assist as transition of care planning needs arise. Care Management Interventions  PCP Verified by CM: Yes  Palliative Care Criteria Met (RRAT>21 & CHF Dx)?: No  Mode of Transport at Discharge: Other (see comment) (Anticipate need for medical transport)  Transition of Care Consult (CM Consult): Discharge Planning  Discharge Durable Medical Equipment: No (Uses inhaler, no O2 use, no DME use)  Physical Therapy Consult: No  Occupational Therapy Consult: No  Speech Therapy Consult: No  Support Systems: Friend/Neighbor (Pt is her mother's caregiver. She has 2 brothers, but pt does not endorse their support.  Pt endorses support from close friend, [facility])  Confirm Follow Up Transport: Friends (Friend, [employer] [facility])  The Patient and/or Patient Representative was Provided with a Choice of Provider and Agrees with the Discharge Plan?: Yes  Discharge Location  Patient Expects to be Discharged to[de-identified] [address] El[facility], [address]

## 2023-02-07 NOTE — CONSULTS
[facility] Consult Note        NAME: Rosas Ying       :  1965       MRN:  [id number]     Date/Time: 2023    Risk of deterioration: medium       Assessment:    Plan:  Acute on chronic hyponatremia  ETOH  HTN  Psych  Mucous plugging/sob Sodium marla 118  Sodium 122 today  Add neutrophos  Nacl tabs--reviewed urine indices-she would respond to ivf-will continue salt tabe for now  Etoh cessation  I have reviewed records from [facility]-has been seen by nephrology there in past-working diagnosis etoh/increased water intake  No need for hot salt at this time    CTA (-) PE       Asked to see for hyponatremia in the setting of sob/hx of etoh use. Chart reviewed. Pt interviewed  Very thirsty on rounds earlier today. Have reviewed [facility] records. Subjective:     Chief Complaint:  I'm thirsty    Review of Systems: no n/v/cp/f/c  (+) cough/sob  (-) dysuria    Objective:     VITALS:   Last 24hrs VS reviewed since prior progress note. Most recent are:  Visit Vitals  BP (!) 138/95   Pulse (!) 114   Temp 98.2 °F (36.8 °C)   Resp 18   Ht 5' 3\" (1.6 m)   Wt 54 kg (119 lb)   SpO2 98%   BMI 21.08 kg/m²     SpO2 Readings from Last 6 Encounters:   23 98%    O2 Flow Rate (L/min): 2 l/min     Intake/Output Summary (Last 24 hours) at 2023 1334  Last data filed at 2023 0631  Gross per 24 hour   Intake --   Output 500 ml   Net -500 ml        Telemetry Reviewed       PHYSICAL EXAM:    General   well developed, chronically ill appearing wf  EENT  Normocephalic, Atraumatic,  EOMI, sclera clear, nares clear, pharynx normal  Respiratory   Clear To Auscultation bilaterally  Cardiology  Regular Rate and Rythmn    Abdominal  Soft, non-tender, non-distended, positive bowel sounds  Extremities  No clubbing, cyanosis, or edema. Pulses intact.               Lab Data Reviewed: (see below)    Medications Reviewed: (see below)    PMH/SH reviewed - no change compared to H&P________________    Attending Physician: Shoshana Kovacs MD ____________________________________________________  MEDICATIONS:  Current Facility-Administered Medications   Medication Dose Route Frequency    sodium chloride soluble tablet 1,000 mg  1 g Oral BID    albuterol-ipratropium (DUO-NEB) 2.5 MG-0.5 MG/3 ML  3 mL Nebulization Q4H PRN    arformoterol 15 mcg/budesonide 0.5 mg neb solution   Nebulization BID RT    predniSONE (DELTASONE) tablet 40 mg  40 mg Oral DAILY WITH BREAKFAST    therapeutic multivitamin (THERAGRAN) tablet 1 Tablet  1 Tablet Oral DAILY    folic acid (FOLVITE) tablet 1 mg  1 mg Oral DAILY    thiamine mononitrate (B-1) tablet 100 mg  100 mg Oral DAILY    potassium, sodium phosphates (NEUTRA-PHOS) packet 2 Packet  2 Packet Oral ONCE    sodium chloride (NS) flush 5-40 mL  5-40 mL IntraVENous Q8H    sodium chloride (NS) flush 5-40 mL  5-40 mL IntraVENous PRN    acetaminophen (TYLENOL) tablet 650 mg  650 mg Oral Q6H PRN    Or    acetaminophen (TYLENOL) suppository 650 mg  650 mg Rectal Q6H PRN    polyethylene glycol (MIRALAX) packet 17 g  17 g Oral DAILY PRN    ondansetron (ZOFRAN ODT) tablet 4 mg  4 mg Oral Q8H PRN    Or    ondansetron (ZOFRAN) injection 4 mg  4 mg IntraVENous Q6H PRN    enoxaparin (LOVENOX) injection 40 mg  40 mg SubCUTAneous DAILY    ipratropium (ATROVENT) 0.02 % nebulizer solution 0.5 mg  0.5 mg Nebulization Q4H PRN    levalbuterol (XOPENEX) nebulizer soln 1.25 mg/3 mL  1.25 mg Nebulization Q4H PRN    LORazepam (ATIVAN) injection 1 mg  1 mg IntraVENous Q2H PRN    LORazepam (ATIVAN) injection 2 mg  2 mg IntraVENous Q2H PRN    nicotine (NICODERM CQ) 21 mg/24 hr patch 1 Patch  1 Patch TransDERmal DAILY    balsam peru-castor oiL (VENELEX) ointment   Topical BID        LABS:  Recent Labs     02/08/23  0258 02/06/23  2209   WBC 4.3 3.4*   HGB 12.9 13.8   HCT 37.9 39.7   * 147*     Recent Labs     02/08/23  0258 02/07/23  0748 02/07/23  0512   * 124* 121*   K 3.8 3.6 4.2   CL 84* 89* 87*   CO2 30 29 28   BUN 6 6 5*   CREA 0.42* 0.54* 0.53*   GLU 75 162* 164*   CA 8.6 7.4* 7.9*   MG  --   --  1.7  1.7   PHOS  --   --  3.2  3.1     Recent Labs     02/06/23  2209   ALT 28   *   TBILI 0.8   TP 6.5   ALB 3.0*   GLOB 3.5     No results for input(s): INR, PTP, APTT, INREXT in the last 72 hours. No results for input(s): FE, TIBC, PSAT, FERR in the last 72 hours. No results for input(s): PH, PCO2, PO2 in the last 72 hours. No results for input(s): CPK, CKNDX, TROIQ in the last 72 hours.     No lab exists for component: CPKMB  No results found for: Darius Mccall

## 2023-02-07 NOTE — PROGRESS NOTES
1329 - Report received. Care of patient assumed. 1551 - TRANSFER - OUT REPORT:    Verbal report given to roland RN(name) on Liat Polo  being transferred to [facility](unit) for routine progression of care       Report consisted of patients Situation, Background, Assessment and   Recommendations(SBAR). Information from the following report(s) SBAR, Kardex, Procedure Summary, Intake/Output, MAR, Accordion, Recent Results, and Med Rec Status was reviewed with the receiving nurse. Lines:   Peripheral IV Left Antecubital (Active)        Opportunity for questions and clarification was provided.       Patient transported with:   [employer]

## 2023-02-07 NOTE — ED NOTES
0130: Post ativan, pt is very lethargic and hard to respond. MD at bedside and notified about BP and mentation  0530: Pt cleaned up and provided fresh linen at this time. D/T urge incontinence, pt placed on purwick.    0600: Pt ambulated to bathroom with assistance at this time

## 2023-02-07 NOTE — DISCHARGE INSTRUCTIONS
Patient Follow Up Instructions: Activity: Activity as tolerated  Diet: DIET ONE TIME MESSAGE  ADULT ORAL NUTRITION SUPPLEMENT Breakfast, Dinner; Standard High Calorie/High Protein  ADULT DIET Regular; 1200 ml; Safety Tray; Safety Tray (Disposables)  Wound Care: None needed  Follow-up with PCP in  1 week. Follow-up tests/labs BMP in one week  If you have any concerns that you feel you need to come back to the hospital, please do not hesitate. albuterol and ipratropium (inhalation)  Pronunciation:  minnie DURON ter ol and PEG MOTTA pee um  Brand:  Combivent Respimat  What is the most important information I should know about albuterol and ipratropium inhalation? Seek medical attention if your breathing problems get worse quickly, or if you think your medications are not working as well. Overuse of albuterol and ipratropium may increase the risk of death. It is critical that you use only the prescribed dose of this medicine. What is albuterol and ipratropium inhalation? Albuterol and ipratropium are bronchodilators that relax muscles in the airways and increase air flow to the lungs. Albuterol and ipratropium inhalation is used to prevent bronchospasm in people with chronic obstructive pulmonary disease (COPD) who are also using other medicines to control their condition. Albuterol and ipratropium inhalation may also be used for purposes not listed in this medication guide. What should I discuss with my healthcare provider before using albuterol and ipratropium inhalation? You should not use this medicine if you are allergic to albuterol, ipratropium, or atropine. Tell your doctor if you have ever had:  heart disease, high blood pressure, coronary artery disease, or heart rhythm disorder;  a seizure disorder such as epilepsy;  diabetes;  overactive thyroid;  glaucoma;  liver or kidney disease; or  enlarged prostate, problems with urination. Tell your doctor if you are pregnant or breastfeeding.   This medicine is not approved for use by anyone younger than 25years old. How should I use albuterol and ipratropium inhalation? Follow all directions on your prescription label and read all medication guides or instruction sheets. Use the medicine exactly as directed. Albuterol and ipratropium inhalation is usually used 4 times per day. Follow your doctor's dosing instructions very carefully. Do not use more than 6 inhalations in a 24-hour period. Overuse of this medicine may increase the risk of death. It is critical that you use only the prescribed dose of this medicine. Read and carefully follow any Instructions for Use provided with your medicine. Ask your doctor or pharmacist if you do not understand these instructions. Always use the new inhaler device provided with your refill. To use the inhaler: You do not need to shake the inhaler before use. Uncap the mouthpiece of the inhaler. Breathe out fully. Put the mouthpiece into your mouth and close your lips. Keep your eyes closed to prevent spraying any medicine into your eyes. Breathe in slowly while pressing the dose-release button on the inhaler. Hold your breath for 10 seconds, then breathe out slowly. Close the cap until you use your inhaler again. Carefully follow all directions for cleaning your specific inhaler device once per week. Keep track of the number of sprays you have used. Throw away the Combivent Respimat inhaler canister after 3 months or 120 sprays, whichever comes first.  To use the solution with a nebulizer:  Open the foil pouch and remove one vial. Empty the medicine into the chamber of the nebulizer. Attach the mouthpiece or face mask, then attach the drug chamber to the compressor. Sit upright in a comfortable position. Place the mouthpiece into your mouth or put on the face mask, covering your nose and mouth. Turn on the compressor.   Breathe in slowly and evenly until no more mist is formed by the nebulizer and the drug chamber is empty. Clean the nebulizer after each use. Follow the cleaning directions that came with your nebulizer. Seek medical attention if your breathing problems get worse quickly, or if you think your medications are not working as well. To make sure this medicine is not causing harmful effects on your lungs, you may need to have chest X rays or other frequent lung function tests. Store at room temperature away from moisture, heat, and light. Do not freeze. Keep each vial in its foil pouch until you are ready to use it. Keep the cover on your inhaler when not in use. Keep away from open flame or high heat. The canister may explode if it gets too hot. Do not puncture or burn an empty inhaler canister. What happens if I miss a dose? Use the medicine as soon as you can, but skip the missed dose if it is almost time for your next dose. Do not use two doses at one time. Get your prescription refilled before you run out of medicine completely. What happens if I overdose? Seek emergency medical attention or call the Poison Help line at 1-253.584.5234. An overdose of albuterol and ipratropium can be fatal. Overdose symptoms may include chest pain, fast or pounding heartbeats, tremors, dry mouth, extreme thirst, muscle weakness or limp feeling, severe headache, pounding in your neck or ears, or feeling like you might pass out. What should I avoid while using albuterol and ipratropium inhalation? If this medication gets in your eyes, rinse with water and seek medical attention. Avoid driving or hazardous activity until you know how this medicine will affect you. Your vision or reactions could be impaired. What are the possible side effects of albuterol and ipratropium inhalation? Get emergency medical help if you have signs of an allergic reaction: hives; difficult breathing; swelling of your face, lips, tongue, or throat.   Call your doctor at once if you have:  wheezing, choking, or other breathing problems after using this medicine;  chest pain;  fast or pounding heartbeats, fluttering in your chest;  tremors, nervousness;  swelling of your ankles or feet;  blurred vision, tunnel vision, eye pain, or seeing halos around lights;  painful or difficult urination; or  low potassium --leg cramps, constipation, irregular heartbeats, fluttering in your chest, increased thirst or urination, numbness or tingling, muscle weakness or limp feeling. Common side effects may include:  headache;  trouble breathing; or  cold symptoms such as stuffy nose, sneezing, cough, or sore throat. This is not a complete list of side effects and others may occur. Call your doctor for medical advice about side effects. You may report side effects to FDA at 4-184-FDA-7004. What other drugs will affect albuterol and ipratropium inhalation? Tell your doctor about all your other medicines, especially:  a diuretic or \"water pill\";  heart or blood pressure medicine;  other beta-blockers; or  an antidepressant. This list is not complete. Other drugs may affect albuterol and ipratropium, including prescription and over-the-counter medicines, vitamins, and herbal products. Not all possible drug interactions are listed here. Where can I get more information? Your pharmacist can provide more information about albuterol and ipratropium inhalation. Remember, keep this and all other medicines out of the reach of children, never share your medicines with others, and use this medication only for the indication prescribed. Every effort has been made to ensure that the information provided by [address] is accurate, up-to-date, and complete, but no guarantee is made to that effect. Drug information contained herein may be time sensitive.  [employer] information has been compiled for use by healthcare practitioners and consumers in the [place] and therefore [employer] does not warrant that uses outside of the [place] are appropriate, unless specifically indicated otherwise. [facility] information does not endorse drugs, diagnose patients or recommend therapy. [facility]'s drug information is an informational resource designed to assist licensed healthcare practitioners in caring for their patients and/or to serve consumers viewing this service as a supplement to, and not a substitute for, the expertise, skill, knowledge and judgment of healthcare practitioners. The absence of a warning for a given drug or drug combination in no way should be construed to indicate that the drug or drug combination is safe, effective or appropriate for any given patient. [facility] does not assume any responsibility for any aspect of healthcare administered with the aid of information [facility] provides. The information contained herein is not intended to cover all possible uses, directions, precautions, warnings, drug interactions, allergic reactions, or adverse effects. If you have questions about the drugs you are taking, check with your doctor, nurse or pharmacist.  Copyright 7412-3675 [address] [place] [address] Dr NAPIER. Version: 8.01. Revision date: 10/5/2020. Care instructions adapted under license by [employer] (which disclaims liability or warranty for this information). If you have questions about a medical condition or this instruction, always ask your healthcare professional. [place] [address] any warranty or liability for your use of this information. Metoprolol (By mouth)   Metoprolol (met-oh-PROE-lol)  Treats high blood pressure, angina (chest pain), and heart failure. May lower the risk of death after a heart attack. This medicine is a beta-blocker. Brand Name(s): Lopressor, Toprol XL   There may be other brand names for this medicine. When This Medicine Should Not Be Used: This medicine is not right for everyone. Do not use if you had an allergic reaction to metoprolol or similar medicines.  Do not use this medicine if you have certain blood circulation or heart problems. Ask your doctor about these problems. How to Use This Medicine:   Tablet, Long Acting Tablet  Take your medicine as directed. Your dose may need to be changed several times to find what works best for you. Take this medicine with a meal or right after a meal. Take this medicine the same way every day, at the same time. Swallow the tablet whole with a glass of water. You may break the extended-release tablet in half, but do not chew or crush it. Missed dose: Take a dose as soon as you remember. If it is almost time for your next dose, wait until then and take a regular dose. Do not take extra medicine to make up for a missed dose. Store the medicine in a closed container at room temperature, away from heat, moisture, and direct light. Drugs and Foods to Avoid:   Ask your doctor or pharmacist before using any other medicine, including over-the-counter medicines, vitamins, and herbal products. Some medicines can affect how metoprolol works.  Tell your doctor if you are taking any of the following:   Digoxin, dipyridamole, hydralazine, hydroxychloroquine, methyldopa, quinidine  Medicine to treat depression (such as bupropion, clomipramine, desipramine, fluoxetine, fluvoxamine, paroxetine, sertraline), medicine to treat mental illness (such as chlorpromazine, fluphenazine, haloperidol, thioridazine), medicine for heart rhythm problems (such as propafenone), HIV/AIDS medicine (such as ritonavir), medicine to treat a fungus infection (such as terbinafine), a monoamine oxidase inhibitor (MAOI), an ergot medicine for headaches, a calcium channel blocker (such as amlodipine, diltiazem, verapamil), or an alpha blocker (such as clonidine, prazosin, reserpine, guanethidine)  Warnings While Using This Medicine:   Tell your doctor if you are pregnant or breastfeeding, or if you have blood vessel, heart, or circulation problems (such as heart failure, rhythm problems, or slow heartbeat). Tell your doctor if you have kidney disease, liver disease, diabetes, lung disease (such as asthma), an overactive thyroid, or a history of allergies. This medicine may cause worse symptoms of heart failure while the dose is being adjusted. Do not stop using this medicine suddenly. Your doctor will need to slowly decrease your dose before you stop it completely. Tell any doctor or dentist who treats you that you are using this medicine. You may need to stop using this medicine several days before you have surgery or medical tests. This medicine could lower your blood pressure too much, especially when you first use it or if you are dehydrated. Stand or sit up slowly if you feel lightheaded or dizzy. This medicine may make you dizzy or drowsy. Do not drive or do anything else that could be dangerous until you know how this medicine affects you. Your doctor will check your progress and the effects of this medicine at regular visits. Keep all appointments. Keep all medicine out of the reach of children. Never share your medicine with anyone. Possible Side Effects While Using This Medicine:   Call your doctor right away if you notice any of these side effects: Allergic reaction: Itching or hives, swelling in your face or hands, swelling or tingling in your mouth or throat, chest tightness, trouble breathing  Lightheadedness, dizziness, or fainting  Slow heartbeat  Swelling in your hands, ankles, or feet, trouble breathing, tiredness  Worsening chest pain  If you notice these less serious side effects, talk with your doctor:   Diarrhea  Mild dizziness or tiredness  If you notice other side effects that you think are caused by this medicine, tell your doctor. Call your doctor for medical advice about side effects.  You may report side effects to FDA at 4-246-YUL-8790  © 2017 [facility] Information is for End User's use only and may not be sold, redistributed or otherwise used for commercial purposes. The above information is an  only. It is not intended as medical advice for individual conditions or treatments. Talk to your doctor, nurse or pharmacist before following any medical regimen to see if it is safe and effective for you. Hyponatremia: Care Instructions  Your Care Instructions     Hyponatremia (say \"mt-dt-jnv-TREE-yumiko-uh\") means that you don't have enough sodium in your blood. It can cause nausea, vomiting, and headaches. Or you may not feel hungry. In serious cases, it can cause seizures, a coma, or even death. Hyponatremia is not a disease. It is a problem caused by something else, such as medicines or exercising for a long time in hot weather. You can get hyponatremia if you lose a lot of fluids and then you drink a lot of water or other liquids that don't have much sodium. You can also get it if you have kidney, liver, heart, or other health problems. Treatment is focused on getting your sodium levels back to normal.  Follow-up care is a key part of your treatment and safety. Be sure to make and go to all appointments, and call your doctor if you are having problems. It's also a good idea to know your test results and keep a list of the medicines you take. How can you care for yourself at home? If your doctor recommends it, drink fluids that have sodium. Sports drinks are a good choice. Or you can eat salty foods. If your doctor recommends it, limit the amount of water you drink. And limit fluids that are mostly water. These include tea, coffee, and juice. Take your medicines exactly as prescribed. Call your doctor if you have any problems with your medicine. Get your sodium levels tested when your doctor tells you to. When should you call for help? Call 911 anytime you think you may need emergency care. For example, call if:    You have a seizure. You passed out (lost consciousness).    Call your doctor now or seek immediate medical care if:    You are confused or it is hard to focus. You have little or no appetite. You feel sick to your stomach or you vomit. You have a headache. You have mood changes. You feel more tired than usual.   Watch closely for changes in your health, and be sure to contact your doctor if:    You do not get better as expected. Current as of: June 16, 2022               Content Version: 13.4  © 2006-2022 [employer]. Care instructions adapted under license by [facility] (which disclaims liability or warranty for this information). If you have questions about a medical condition or this instruction, always ask your healthcare professional. [address] any warranty or liability for your use of this information. Chronic Obstructive Pulmonary Disease (COPD): Care Instructions  Overview     Chronic obstructive pulmonary disease (COPD) is a lung disease that makes it hard to breathe. With COPD, the airways that lead to the lungs are narrowed, and the tiny air sacs in the lungs are damaged and lose their stretch. People with COPD have decreased airflow in and out of the lungs, which makes it hard to breathe. The airways also can get clogged with thick mucus. Cigarette smoking is a major cause of COPD. Although there is no cure for COPD, you can slow its progress. Following your treatment plan and taking care of yourself can help you feel better and live longer. Follow-up care is a key part of your treatment and safety. Be sure to make and go to all appointments, and call your doctor if you are having problems. It's also a good idea to know your test results and keep a list of the medicines you take. How can you care for yourself at home? Staying healthy    Do not smoke. This is the most important step you can take to prevent more damage to your lungs. If you need help quitting, talk to your doctor about stop-smoking programs and medicines.  These can increase your chances of quitting for good. Avoid colds and other infections. Get the pneumococcal and whooping cough (pertussis) vaccines. If you have had these vaccines before, ask your doctor if you need another dose. Get the flu vaccine every fall. If you must be around people with colds or the flu, wash your hands often. Avoid secondhand smoke and air pollution. Try to stay inside with your windows closed when air pollution is bad. Medicines and oxygen therapy    Take your medicines exactly as prescribed. Call your doctor if you think you are having a problem with your medicine. You may be taking medicines such as:  Bronchodilators. These help open your airways and make breathing easier. They are either short-acting (work for 4 to 9 hours) or long-acting (work for 12 to 24 hours). You inhale most bronchodilators, so they start to act quickly. Always carry your quick-relief inhaler with you in case you need it. Corticosteroids (prednisone, budesonide). These reduce airway inflammation. They come in inhaled or pill form. Ask your doctor or pharmacist if you are using each type of inhaler correctly. With correct use, the medicine is more likely to get to your lungs. See if a spacer is right for you. A spacer may also help you get more inhaled medicine to your lungs. If you use one, ask how to use it properly. Do not take any vitamins, over-the-counter medicine, or herbal products without talking to your doctor first.     If your doctor prescribed antibiotics, take them as directed. Do not stop taking them just because you feel better. You need to take the full course of antibiotics. If you use oxygen therapy, use the flow rate your doctor has recommended. Don't change it without talking to your doctor first. Oxygen therapy boosts the amount of oxygen in your blood and helps you breathe easier. Activity    Get regular exercise. Walking is an easy way to get exercise.  Start out slowly, and walk a little more each day. Pay attention to your breathing. You are exercising too hard if you can't talk while you exercise. Take short rest breaks when doing household chores and other activities. Learn breathing methods--such as breathing through pursed lips--to help you become less short of breath. If your doctor has not set you up with a pulmonary rehabilitation program, ask if rehab is right for you. Rehab includes exercise programs, education about your disease and how to manage it, help with diet and other changes, and emotional support. Diet    Eat regular, healthy meals. Use bronchodilators about 1 hour before you eat to make it easier to eat. Eat several smaller, frequent meals to prevent getting too full. A full stomach can push on the muscle that helps you breathe (your diaphragm) and make it harder to breathe. Drink beverages at the end of the meal.     Avoid foods that are hard to chew. Eat foods that contain protein so you don't lose muscle mass. Talk with your doctor if you gain too much weight or if you lose weight without trying. Mental health    Talk to your family, friends, or a therapist about your feelings. Some people feel frightened, angry, hopeless, helpless, and even guilty. Talking openly about feelings may help you cope. If these feelings last, talk to your doctor. When should you call for help? Call 911 anytime you think you may need emergency care. For example, call if:    You have severe trouble breathing. You have severe chest pain. Call your doctor now or seek immediate medical care if:    You have new or worse trouble breathing. You have new or worse chest pain. You cough up blood. You have a fever. Watch closely for changes in your health, and be sure to contact your doctor if:    You cough more deeply or more often, especially if you notice more mucus or a change in the color of your mucus.      You have new or worse swelling in your legs or belly. You have feelings of anxiety or depression. You need to use your antibiotic or steroid pills. You are not getting better as expected. Where can you learn more? Go to http://www.gray.com/  Enter Z471 in the search box to learn more about \"Chronic Obstructive Pulmonary Disease (COPD): Care Instructions. \"  Current as of: March 9, 2022               Content Version: 13.4  © 2006-2022 [employer]. Care instructions adapted under license by [employer] (which disclaims liability or warranty for this information). If you have questions about a medical condition or this instruction, always ask your healthcare professional. [place] [address] any warranty or liability for your use of this information. Oxygen Therapy: Care Instructions  Overview     Oxygen therapy helps you get more oxygen into your lungs and bloodstream. You may use it if you have a disease that makes it hard to breathe, such as COPD, pulmonary fibrosis (scarring of the lungs), or heart failure. Oxygen therapy can make it easier for you to breathe and can reduce your heart's workload. Some people need extra oxygen all the time. Others need it from time to time throughout the day or overnight. A doctor will prescribe how much oxygen you need and how often to use it. To breathe the oxygen, most people use a nasal cannula (say \"SHAQ-yuh-bess\"). This is a thin tube with two prongs that fit just inside your nose. People who need a lot of oxygen may need to use a mask that fits over the nose and mouth. Follow-up care is a key part of your treatment and safety. Be sure to make and go to all appointments, and call your doctor if you are having problems. It's also a good idea to know your test results and keep a list of the medicines you take. How can you care for yourself at home?   To help yourself  Using oxygen may dry out your nose or lips. Use water-based lubricants on your lips or nostrils. Do not use an oil-based product like petroleum jelly. They may cause skin burns. If you use a nasal cannula, the tubing may rub under your nostrils and around your ears. To keep your skin from getting sore, tuck some gauze under the tubing. Use a water-based lotion on rubbed areas. Do not use alcohol or take drugs that relax you, because they will slow your breathing rate. Keep track of how much oxygen is in the tank, and reorder before it runs out. If a holiday is coming up or you expect bad weather, order in advance or make your regular order larger. You may need extra oxygen when you travel to high altitudes or travel by plane. Ask your doctor about this. If you are getting oxygen directly to your windpipe through an opening in your neck, your doctor will teach you how to care for the equipment. To make sure oxygen is flowing  Check the flow by holding your mask or cannula up to your ear and listening for the \"hiss\" of airflow. If you have a nasal cannula, dip the prongs in a glass of water. If you see bubbles, oxygen is coming through. Check your pressure gauge or contents indicator. If you use an oxygen concentrator, make sure it is turned on and plugged in. If you use a cylinder, make sure the valve is open. Look for kinks, blockages, or water in the tubing. Be sure the tubing is connected to the oxygen source. Do not change your oxygen flow rate. Your doctor sets this at the correct level. Higher flow rates usually do not help and can increase the risk of harmful carbon dioxide buildup in the blood. To be safe  Do not leave cords or tubing running across an area where you or someone else may trip on it. Do not let oxygen containers get hot. Store them in a cool place where there is airflow. Do not leave them in a car trunk or a hot vehicle. Keep oxygen containers upright. Make sure they do not fall over and get damaged.  Try securing the tanks in a sturdy container or securing them with a rope or a chain. Avoid touching frost that can form on liquid oxygen devices. [employer] can cause skin burns. Watch for signs of oxygen leaks. If you hear a loud hissing from your container or if it empties too fast, stay away from the container. Open windows right away and call the company that brought the oxygen system to your home. Do not use oxygen around anything that could spark or easily cause a fire. Do not smoke or vape or let others smoke or vape while you are using oxygen. Put up \"no smoking\" signs in your home. Do not use oxygen near open flames, such as candles, fireplaces, gas stoves, or hot water heaters. Do not use it near electric razors, hair dryers, heating pads, or anything that may spark. Keep a working fire extinguisher in your home where it is easy to get to. If a fire starts, turn off the oxygen right away and leave the house. If you have an oxygen concentrator, do not use it if the cord looks damaged. Do not use an extension cord to plug it in. Do not plug it into an outlet that has other appliances plugged into it. To care for the equipment  Follow the directions that come with the equipment for using and caring for it. Wash your cannula or mask with a liquid soap and warm water daily. Replace them every 2 to 4 weeks. If you have a cold, change the nasal prongs when your cold symptoms are done. If you have an oxygen concentrator, unplug the unit and wipe down the cabinet with a damp cloth daily. Clean the air filter at least once a week. Where can you learn more? Go to http://www.gray.[employer]kids/  Enter E117 in the search box to learn more about \"Oxygen Therapy: Care Instructions. \"  Current as of: March 9, 2022               Content Version: 13.4  © 2006-2022 [employer]. Care instructions adapted under license by [employer] (which disclaims liability or warranty for this information). If you have questions about a medical condition or this instruction, always ask your healthcare professional. [place] [address] any warranty or liability for your use of this information.

## 2023-02-07 NOTE — ED PROVIDER NOTES
EMERGENCY DEPARTMENT HISTORY AND PHYSICAL EXAM        Date: 2/6/2023  Patient Name: Joe Tuttle  Attending of Record: O'Deepika    History of Presenting Illness     Chief Complaint   Patient presents with    Shortness of Breath     Pt arrives from home via EMS for SOB that has been progressively getting worse throughout the day. Hx COPD, hx of smoking  and was smoking a cigarette when EMS arrives and drinking. EMS placed on 2L for comfort. Per EMS, pt has stopped taking all of her medication. EMS states she was covered in bed bugs on arrival and went to [facility] room once she arrived. History Provided By: Patient    HPI: Joe Tuttle is a 61 y.o. female, pmhx COPD, HTN, who presents to the ED c/o shortness of breath. Pt reports symptoms started around 3pm today. Denies any chest pain or fevers, just feels as though she can't catch her breath. She has been dealing with some social and family situations, and has been unable to see a doctor to refill her medications. She has not had an inhaler or any other meds for some time. She has tried to cut back on drinking, but still drinks about 2x 40oz beers daily, and was drinking when she called EMS today. PCP: None    There are no other complaints, changes, or physical findings at this time. Past History     Past Medical History:  No past medical history on file. Past Surgical History:  No past surgical history on file. Family History:  No family history on file. Social History: Allergies:  No Known Allergies      Review of Systems   Review of Systems   Constitutional:  Negative for chills and fever. Respiratory:  Positive for chest tightness and shortness of breath. Negative for cough. Cardiovascular:  Negative for chest pain and palpitations. Gastrointestinal:  Negative for abdominal pain, nausea and vomiting. Genitourinary:  Negative for dysuria and hematuria. Neurological:  Negative for dizziness and headaches. Physical Exam   Physical Exam  Constitutional:       General: She is not in acute distress. Eyes:      Extraocular Movements: Extraocular movements intact. Cardiovascular:      Rate and Rhythm: Regular rhythm. Tachycardia present. Pulmonary:      Effort: Pulmonary effort is normal. Tachypnea present. No respiratory distress. Breath sounds: Examination of the right-middle field reveals wheezing. Examination of the left-middle field reveals wheezing. Examination of the right-lower field reveals wheezing. Examination of the left-lower field reveals wheezing. Wheezing present. Abdominal:      Palpations: Abdomen is soft. Tenderness: There is no abdominal tenderness. Musculoskeletal:      Right lower leg: No edema. Left lower leg: No edema. Skin:     General: Skin is warm and dry. Neurological:      General: No focal deficit present. Mental Status: She is alert and oriented to person, place, and time.        Diagnostic Study Results     Labs -     Recent Results (from the past 12 hour(s))   EKG, 12 LEAD, INITIAL    Collection Time: 02/06/23  9:58 PM   Result Value Ref Range    Ventricular Rate 95 BPM    Atrial Rate 95 BPM    P-R Interval 184 ms    QRS Duration 76 ms    Q-T Interval 364 ms    QTC Calculation (Bezet) 457 ms    Calculated P Axis 71 degrees    Calculated R Axis -154 degrees    Calculated T Axis 71 degrees    Diagnosis       Normal sinus rhythm  Right superior axis deviation  Anterior infarct , age undetermined  No previous ECGs available     CBC WITH AUTOMATED DIFF    Collection Time: 02/06/23 10:09 PM   Result Value Ref Range    WBC 3.4 (L) 3.6 - 11.0 K/uL    RBC 4.30 3.80 - 5.20 M/uL    HGB 13.8 11.5 - 16.0 g/dL    HCT 39.7 35.0 - 47.0 %    MCV 92.3 80.0 - 99.0 FL    MCH 32.1 26.0 - 34.0 PG    MCHC 34.8 30.0 - 36.5 g/dL    RDW 13.9 11.5 - 14.5 %    PLATELET 073 (L) 087 - 400 K/uL    MPV 9.8 8.9 - 12.9 FL    NRBC 0.0 0  WBC    ABSOLUTE NRBC 0.00 0.00 - 0.01 K/uL NEUTROPHILS 63 32 - 75 %    BAND NEUTROPHILS 2 %    LYMPHOCYTES 23 12 - 49 %    MONOCYTES 8 5 - 13 %    EOSINOPHILS 2 0 - 7 %    BASOPHILS 2 (H) 0 - 1 %    IMMATURE GRANULOCYTES 0 0.0 - 0.5 %    ABS. NEUTROPHILS 2.1 1.8 - 8.0 K/UL    ABS. LYMPHOCYTES 0.8 0.8 - 3.5 K/UL    ABS. MONOCYTES 0.3 0.0 - 1.0 K/UL    ABS. EOSINOPHILS 0.1 0.0 - 0.4 K/UL    ABS. BASOPHILS 0.1 0.0 - 0.1 K/UL    ABS. IMM. GRANS. 0.0 0.00 - 0.04 K/UL    DF MANUAL      RBC COMMENTS NORMOCYTIC, NORMOCHROMIC     METABOLIC PANEL, COMPREHENSIVE    Collection Time: 02/06/23 10:09 PM   Result Value Ref Range    Sodium 118 (LL) 136 - 145 mmol/L    Potassium 3.5 3.5 - 5.1 mmol/L    Chloride 80 (L) 97 - 108 mmol/L    CO2 33 (H) 21 - 32 mmol/L    Anion gap 5 5 - 15 mmol/L    Glucose 86 65 - 100 mg/dL    BUN 5 (L) 6 - 20 MG/DL    Creatinine 0.42 (L) 0.55 - 1.02 MG/DL    BUN/Creatinine ratio 12 12 - 20      eGFR >60 >60 ml/min/1.73m2    Calcium 8.3 (L) 8.5 - 10.1 MG/DL    Bilirubin, total 0.8 0.2 - 1.0 MG/DL    ALT (SGPT) 28 12 - 78 U/L    AST (SGOT) 37 15 - 37 U/L    Alk. phosphatase 132 (H) 45 - 117 U/L    Protein, total 6.5 6.4 - 8.2 g/dL    Albumin 3.0 (L) 3.5 - 5.0 g/dL    Globulin 3.5 2.0 - 4.0 g/dL    A-G Ratio 0.9 (L) 1.1 - 2.2     TROPONIN-HIGH SENSITIVITY    Collection Time: 02/06/23 10:09 PM   Result Value Ref Range    Troponin-High Sensitivity 12 0 - 51 ng/L   NT-PRO BNP    Collection Time: 02/06/23 10:09 PM   Result Value Ref Range    NT pro-BNP 1,160 (H) <125 PG/ML   SAMPLES BEING HELD    Collection Time: 02/06/23 10:09 PM   Result Value Ref Range    SAMPLES BEING HELD 1 BLUE     COMMENT        Add-on orders for these samples will be processed based on acceptable specimen integrity and analyte stability, which may vary by analyte. Radiologic Studies -   XR CHEST PORT   Final Result      No acute process.            CT Results  (Last 48 hours)      None          CXR Results  (Last 48 hours)                 02/06/23 2220  XR CHEST PORT Final result    Impression:      No acute process. Narrative:  EXAM:  XR CHEST PORT       INDICATION: Shortness of breath       COMPARISON: none       TECHNIQUE: Portable chest AP view       FINDINGS: The cardiac silhouette is within normal limits. The lungs and pleural   spaces are clear. There are old, healed bilateral rib fractures. Medical Decision Making   I am the first provider for this patient. I reviewed the vital signs, available nursing notes, past medical history, past surgical history, family history and social history. Vital Signs-Reviewed the patient's vital signs. Patient Vitals for the past 12 hrs:   Temp Pulse Resp BP SpO2   02/07/23 0000 -- (!) 117 (!) 31 107/61 91 %   02/06/23 2333 -- (!) 107 15 125/77 93 %   02/06/23 2318 -- (!) 107 17 (!) 122/107 91 %   02/06/23 2303 -- 98 17 134/86 92 %   02/06/23 2248 -- 97 18 (!) 128/93 94 %   02/06/23 2203 97.9 °F (36.6 °C) 98 21 (!) 129/91 94 %         Patient was given the following medications:  Medications   albuterol-ipratropium (DUO-NEB) 2.5 MG-0.5 MG/3 ML (10 mL Nebulization Given 2/6/23 2245)   methylPREDNISolone (PF) (Solu-MEDROL) injection 125 mg (125 mg IntraVENous Given 2/6/23 2244)   sodium chloride 0.9 % bolus infusion 1,000 mL (1,000 mL IntraVENous New Bag 2/6/23 2336)   LORazepam (ATIVAN) injection 1 mg (1 mg IntraVENous Given 2/6/23 2351)       Social determinants of care: Patient is having difficulty affording medications and has had some family issues that prevent her from seeing her doctors recently. Records Reviewed (source and summary of external notes):   Chart reviewed    EKG: TWI V2, V3; no other ECGs available for comparison    Imaging Interpretation by ED provider: CXR reviewed, Final radiologist interpretation reviewed. CC/HPI Summary, DDx, ED Course, and Reassessment:   54-year-old female presenting with shortness of breath. Vitals are stable, sats in the low 90s on room air. Patient appears mildly tachypneic on exam, however no respiratory distress. There is inspiratory and expiratory wheezing bilaterally. Suspect COPD exacerbation vs PNA, will give nebs and steroids, will obtain CBC, CMP, EKG, troponin, chest x-ray    ED Course as of 02/07/23 0040   Mon Feb 06, 2023   2327 Sodium(!!): 118  Pt is mentating appropriately, has a history of mild hyponatremia, likely beer potomania, however this is lower than usual for her. IV fluids ordered. [DM]      ED Course User Index  [DM] Deb Rocha MD       .edcrit      Diagnosis     Clinical Impression:   1. Chronic obstructive pulmonary disease with acute exacerbation ([facility] [address]    2. Hyponatremia      CRITICAL CARE NOTE :        IMPENDING DETERIORATION -Respiratory, Cardiovascular, CNS, Metabolic, and Renal  ASSOCIATED RISK FACTORS - Dysrhythmia and Metabolic changes  MANAGEMENT- Bedside Assessment and Supervision of Care  INTERPRETATION -  Xrays, ECG, and Blood Pressure  INTERVENTIONS - hemodynamic mngmt and Metobolic interventions  CASE REVIEW - Hospitalist/Intensivist and Nursing  TREATMENT RESPONSE -Improved  PERFORMED BY - Self    NOTES   :  I have spent 45 minutes of critical care time involved in lab review, consultations with specialist, family decision- making, bedside attention and documentation. This time excludes time spent in any separate billed procedures. During this entire length of time I was immediately available to the patient . Loreta Kent MD       Admit Note: Pt is being admitted by internal medicine. The results of their tests and reason(s) for their admission have been discussed with pt and/or available family. They convey agreement and understanding for the need to be admitted and for the admission diagnosis. I personally saw and examined the patient. I have reviewed and agree with the residents findings, including all diagnostic interpretations, and plans as written.  I was present during the key portions of separately billed procedures. Jamie Georges MD     Patient presents to the ED with chief complaint of shortness of breath that is chronic but worse since about 3 PM today. She has history of smoking and likely COPD. Also seems to be heavy drinker (mostly beer). She tells me that she lives with her mother right now. On exam, patient is tachycardic but satting well, she is awake, alert, and oriented  Patient has expiratory wheezes throughout and rhonchorous cough. She is very thin. Abdomen is soft and nontender. Patient treated with Solu-Medrol, DuoNeb. Asked for something for anxiety, and there is concern for alcohol withdrawal in this patient. Treated with Ativan IV. Labs reviewed which shows sodium of 118. Patient has history of hyponatremia but not usually this low. Treated with 1 L IV normal saline. Hospitalist consulted for admission.   Jamie Georges MD

## 2023-02-07 NOTE — ED NOTES
Patient stable GCS15  Conscious coherent conversant alert   Lying comfortably in bed   No complaints of pain   Verbalizing she wants to go out for smoking   CIWA done   Due medications given

## 2023-02-07 NOTE — ED NOTES
Bedside shift change report given to Shade Finn (oncoming nurse) by Deisi Daniel (offgoing nurse). Report included the following information SBAR, Kardex, ED Summary, Intake/Output, MAR, Recent Results, and Cardiac Rhythm SINUS TACH .

## 2023-02-08 LAB
ANION GAP SERPL CALC-SCNC: 8 MMOL/L (ref 5–15)
BNP SERPL-MCNC: 1480 PG/ML
BUN SERPL-MCNC: 6 MG/DL (ref 6–20)
BUN/CREAT SERPL: 14 (ref 12–20)
CALCIUM SERPL-MCNC: 8.6 MG/DL (ref 8.5–10.1)
CHLORIDE SERPL-SCNC: 84 MMOL/L (ref 97–108)
CO2 SERPL-SCNC: 30 MMOL/L (ref 21–32)
CREAT SERPL-MCNC: 0.42 MG/DL (ref 0.55–1.02)
ERYTHROCYTE [DISTWIDTH] IN BLOOD BY AUTOMATED COUNT: 13.8 % (ref 11.5–14.5)
GLUCOSE SERPL-MCNC: 75 MG/DL (ref 65–100)
HCT VFR BLD AUTO: 37.9 % (ref 35–47)
HGB BLD-MCNC: 12.9 G/DL (ref 11.5–16)
MCH RBC QN AUTO: 32.3 PG (ref 26–34)
MCHC RBC AUTO-ENTMCNC: 34 G/DL (ref 30–36.5)
MCV RBC AUTO: 94.8 FL (ref 80–99)
NRBC # BLD: 0 K/UL (ref 0–0.01)
NRBC BLD-RTO: 0 PER 100 WBC
PLATELET # BLD AUTO: 138 K/UL (ref 150–400)
PMV BLD AUTO: 10 FL (ref 8.9–12.9)
POTASSIUM SERPL-SCNC: 3.8 MMOL/L (ref 3.5–5.1)
RBC # BLD AUTO: 4 M/UL (ref 3.8–5.2)
SODIUM SERPL-SCNC: 122 MMOL/L (ref 136–145)
WBC # BLD AUTO: 4.3 K/UL (ref 3.6–11)

## 2023-02-08 PROCEDURE — 74011250637 HC RX REV CODE- 250/637: Performed by: INTERNAL MEDICINE

## 2023-02-08 PROCEDURE — 80048 BASIC METABOLIC PNL TOTAL CA: CPT

## 2023-02-08 PROCEDURE — 74011636637 HC RX REV CODE- 636/637: Performed by: INTERNAL MEDICINE

## 2023-02-08 PROCEDURE — 83880 ASSAY OF NATRIURETIC PEPTIDE: CPT

## 2023-02-08 PROCEDURE — 97535 SELF CARE MNGMENT TRAINING: CPT

## 2023-02-08 PROCEDURE — 97166 OT EVAL MOD COMPLEX 45 MIN: CPT

## 2023-02-08 PROCEDURE — 74011000250 HC RX REV CODE- 250: Performed by: NURSE PRACTITIONER

## 2023-02-08 PROCEDURE — 36415 COLL VENOUS BLD VENIPUNCTURE: CPT

## 2023-02-08 PROCEDURE — 97161 PT EVAL LOW COMPLEX 20 MIN: CPT | Performed by: PHYSICAL THERAPIST

## 2023-02-08 PROCEDURE — 65270000046 HC RM TELEMETRY

## 2023-02-08 PROCEDURE — 74011000250 HC RX REV CODE- 250: Performed by: INTERNAL MEDICINE

## 2023-02-08 PROCEDURE — 74011250636 HC RX REV CODE- 250/636: Performed by: NURSE PRACTITIONER

## 2023-02-08 PROCEDURE — 74011250637 HC RX REV CODE- 250/637: Performed by: NURSE PRACTITIONER

## 2023-02-08 PROCEDURE — 97116 GAIT TRAINING THERAPY: CPT | Performed by: PHYSICAL THERAPIST

## 2023-02-08 PROCEDURE — 85027 COMPLETE CBC AUTOMATED: CPT

## 2023-02-08 PROCEDURE — 77010033678 HC OXYGEN DAILY

## 2023-02-08 PROCEDURE — 94640 AIRWAY INHALATION TREATMENT: CPT

## 2023-02-08 RX ORDER — THERA TABS 400 MCG
1 TAB ORAL DAILY
Status: DISCONTINUED | OUTPATIENT
Start: 2023-02-08 | End: 2023-02-21 | Stop reason: HOSPADM

## 2023-02-08 RX ORDER — IPRATROPIUM BROMIDE AND ALBUTEROL SULFATE 2.5; .5 MG/3ML; MG/3ML
3 SOLUTION RESPIRATORY (INHALATION)
Status: DISCONTINUED | OUTPATIENT
Start: 2023-02-08 | End: 2023-02-14

## 2023-02-08 RX ORDER — PREDNISONE 20 MG/1
40 TABLET ORAL
Status: DISCONTINUED | OUTPATIENT
Start: 2023-02-08 | End: 2023-02-15

## 2023-02-08 RX ORDER — SODIUM CHLORIDE 1 G/1
1 TABLET ORAL 2 TIMES DAILY
Status: DISCONTINUED | OUTPATIENT
Start: 2023-02-08 | End: 2023-02-21 | Stop reason: HOSPADM

## 2023-02-08 RX ORDER — FOLIC ACID 1 MG/1
1 TABLET ORAL DAILY
Status: DISCONTINUED | OUTPATIENT
Start: 2023-02-08 | End: 2023-02-21 | Stop reason: HOSPADM

## 2023-02-08 RX ORDER — SODIUM,POTASSIUM PHOSPHATES 280-250MG
2 POWDER IN PACKET (EA) ORAL ONCE
Status: COMPLETED | OUTPATIENT
Start: 2023-02-08 | End: 2023-02-08

## 2023-02-08 RX ORDER — ASPIRIN 325 MG/1
100 TABLET, FILM COATED ORAL DAILY
Status: DISCONTINUED | OUTPATIENT
Start: 2023-02-08 | End: 2023-02-21 | Stop reason: HOSPADM

## 2023-02-08 RX ADMIN — LEVALBUTEROL HYDROCHLORIDE 1.25 MG: 1.25 SOLUTION RESPIRATORY (INHALATION) at 08:33

## 2023-02-08 RX ADMIN — CASTOR OIL AND BALSAM, PERU: 788; 87 OINTMENT TOPICAL at 03:00

## 2023-02-08 RX ADMIN — ARFORMOTEROL TARTRATE: 15 SOLUTION RESPIRATORY (INHALATION) at 20:40

## 2023-02-08 RX ADMIN — Medication 1000 MG: at 12:07

## 2023-02-08 RX ADMIN — CASTOR OIL AND BALSAM, PERU: 788; 87 OINTMENT TOPICAL at 08:57

## 2023-02-08 RX ADMIN — SODIUM CHLORIDE, PRESERVATIVE FREE 10 ML: 5 INJECTION INTRAVENOUS at 06:13

## 2023-02-08 RX ADMIN — PREDNISONE 40 MG: 20 TABLET ORAL at 11:42

## 2023-02-08 RX ADMIN — THIAMINE HCL TAB 100 MG 100 MG: 100 TAB at 11:42

## 2023-02-08 RX ADMIN — ENOXAPARIN SODIUM 40 MG: 100 INJECTION SUBCUTANEOUS at 08:55

## 2023-02-08 RX ADMIN — IPRATROPIUM BROMIDE 0.5 MG: 0.5 SOLUTION RESPIRATORY (INHALATION) at 08:33

## 2023-02-08 RX ADMIN — THERA TABS 1 TABLET: TAB at 11:42

## 2023-02-08 RX ADMIN — SODIUM CHLORIDE, PRESERVATIVE FREE 10 ML: 5 INJECTION INTRAVENOUS at 22:54

## 2023-02-08 RX ADMIN — CASTOR OIL AND BALSAM, PERU: 788; 87 OINTMENT TOPICAL at 22:57

## 2023-02-08 RX ADMIN — LORAZEPAM 1 MG: 2 INJECTION INTRAMUSCULAR at 15:19

## 2023-02-08 RX ADMIN — LORAZEPAM 1 MG: 2 INJECTION INTRAMUSCULAR at 11:27

## 2023-02-08 RX ADMIN — SODIUM CHLORIDE, PRESERVATIVE FREE 10 ML: 5 INJECTION INTRAVENOUS at 15:02

## 2023-02-08 RX ADMIN — POTASSIUM & SODIUM PHOSPHATES POWDER PACK 280-160-250 MG 2 PACKET: 280-160-250 PACK at 15:01

## 2023-02-08 RX ADMIN — FOLIC ACID 1 MG: 1 TABLET ORAL at 11:42

## 2023-02-08 NOTE — PROGRESS NOTES
Spiritual Care Partner Volunteer visited patient at [facility] in [facility] on 2/8/2023   Documented by: Racheal hayes : [phone]  ([phone])

## 2023-02-08 NOTE — PROGRESS NOTES
End of Shift Note    Bedside shift change report given to JORGE Martinez (oncoming nurse) by Robert Schrader RN (offgoing nurse).   Report included the following information SBAR, Kardex, ED Summary, Intake/Output, MAR, and Recent Results    Shift worked:  night     Shift summary and any significant changes:     PRN ativan given x1 for CIWA score     Patient rested comfortably, weaned to room air this AM     Concerns for physician to address:  See above     Zone phone for oncoming shift:   [id number]         Robert Schrader RN

## 2023-02-08 NOTE — PROGRESS NOTES
Hospitalist Progress Note    NAME: Sophia Prado   :  1965   MRN:  [id number]       Assessment / Plan:  Acute hypoxic respiratory failure, POA  COPD exacerbation, POA  Multiple rib fractures, POA  - EKG on admission with no acute ischemic changes  - proBNP around 1600  - On oxygen 2 L via nasal cannula  - CTA with no PE. Acute right ninth rib fracture, multiple bilateral remote rib fractures  - Spirometry as tolerated  - Add DuoNeb, arformoterol/budesonide, prednisone  - Wean off oxygen as tolerated  - We will ask PT OT to eval    Acute on chronic hyponatremia, POA  - Presented with sodium 118, baseline around 126  - Sodium improved to 124, back down to 122 today  - Nephrology input is appreciated  - Urine and serum studies compatible with possibly increase water intake  - Follow BMP very closely  - Etiology can be related for alcohol abuse and possibly excess water intake although patient is denying    Suicidal ideation  - Patient reported some suicide ideation to RN and to me  - Suicide precautions, constant observation  - Psychiatrist consult. Alcohol abuse  - Patient reported that she drinks on daily basis  - Watch for alcohol withdrawal syndrome    Nicotine use  - Counseling was provided about smoking cessation    Incidental finding on CTA chest    Compression of multiple vertebral bodies. Partial atelectasis/scarring in the right middle lobe. Partially opacified bronchioles supplying the posterior aspect of the left  lower lobe suggesting mucus plugging and/or bronchitis. Will need close follow-up as an outpatient    18.5 - 24.9 Normal weight / Body mass index is 21.08 kg/m². Estimated discharge date: February 10  Barriers: Clinical improvement    Code status: Full  Prophylaxis: Lovenox  Recommended Disposition:  TBD     Subjective:     Patient was seen and examined. No acute events overnight. Discussed with RN overnight events. All patient's questions were answered.     \"Still having pain every time I try to take a deep breath\"    Review of Systems:  Symptom Y/N Comments  Symptom Y/N Comments   Fever/Chills n   Chest Pain y Pleuritic   Poor Appetite    Edema     Cough n   Abdominal Pain n    Sputum    Joint Pain     SOB/JARAMILLO n   Pruritis/Rash     Nausea/vomit n   Tolerating PT/OT     Diarrhea    Tolerating Diet y    Constipation    Other       Could NOT obtain due to:          Objective:     VITALS:   Last 24hrs VS reviewed since prior progress note. Most recent are:  Patient Vitals for the past 24 hrs:   Temp Pulse Resp BP SpO2   02/08/23 0835 -- -- -- -- 94 %   02/08/23 0749 98.2 °F (36.8 °C) (!) 111 18 (!) 133/94 95 %   02/08/23 0713 -- -- -- -- 95 %   02/08/23 0505 -- -- -- -- 98 %   02/08/23 0258 99 °F (37.2 °C) (!) 115 18 (!) 145/92 100 %   02/07/23 2329 98.3 °F (36.8 °C) (!) 108 20 (!) 143/92 100 %   02/07/23 2227 -- -- -- -- 97 %   02/07/23 1940 98 °F (36.7 °C) (!) 114 20 (!) 152/101 100 %   02/07/23 1742 -- -- -- -- 94 %   02/07/23 1740 -- -- -- -- (!) 78 %   02/07/23 1640 98.2 °F (36.8 °C) (!) 105 18 (!) 142/90 100 %   02/07/23 1346 -- -- -- (!) 136/91 --   02/07/23 1339 98.3 °F (36.8 °C) (!) 110 18 (!) 134/102 96 %   02/07/23 1118 -- (!) 101 21 113/79 94 %   02/07/23 1103 -- (!) 108 20 114/80 91 %       Intake/Output Summary (Last 24 hours) at 2/8/2023 1100  Last data filed at 2/8/2023 0631  Gross per 24 hour   Intake --   Output 500 ml   Net -500 ml        I had a face to face encounter and independently examined this patient on 2/8/2023, as outlined below:  PHYSICAL EXAM:  General: WD, WN. Alert, cooperative, no acute distress    EENT:  EOMI. Anicteric sclerae. MMM  Resp:  CTA bilaterally, no wheezing or rales. No accessory muscle use  CV:  Regular  rhythm,  No edema  GI:  Soft, Non distended, Non tender. +Bowel sounds  Neurologic:  EOMs intact. No facial asymmetry. No aphasia or slurred speech. Symmetrical strength, Sensation grossly intact. Alert and oriented X 4. Psych:   Good insight. Not anxious nor agitated  Skin:  No rashes. No jaundice    Reviewed most current lab test results and cultures  YES  Reviewed most current radiology test results   YES  Review and summation of old records today    NO  Reviewed patient's current orders and MAR    YES  PMH/SH reviewed - no change compared to H&P  ________________________________________________________________________  Care Plan discussed with:    Comments   Patient X    Family      RN X    Care Manager     Consultant                        Multidiciplinary team rounds were held today with , nursing, pharmacist and clinical coordinator. Patient's plan of care was discussed; medications were reviewed and discharge planning was addressed. ________________________________________________________________________        Comments   >50% of visit spent in counseling and coordination of care X    ________________________________________________________________________  Lillian Donnelly MD     Procedures: see electronic medical records for all procedures/Xrays and details which were not copied into this note but were reviewed prior to creation of Plan. LABS:  I reviewed today's most current labs and imaging studies.   Pertinent labs include:  Recent Labs     02/08/23 0258 02/06/23 2209   WBC 4.3 3.4*   HGB 12.9 13.8   HCT 37.9 39.7   * 147*     Recent Labs     02/08/23 0258 02/07/23  0748 02/07/23  0512 02/06/23 2209   * 124* 121* 118*   K 3.8 3.6 4.2 3.5   CL 84* 89* 87* 80*   CO2 30 29 28 33*   GLU 75 162* 164* 86   BUN 6 6 5* 5*   CREA 0.42* 0.54* 0.53* 0.42*   CA 8.6 7.4* 7.9* 8.3*   MG  --   --  1.7  1.7  --    PHOS  --   --  3.2  3.1  --    ALB  --   --   --  3.0*   TBILI  --   --   --  0.8   ALT  --   --   --  28       Signed: Lillian Donnelly MD

## 2023-02-08 NOTE — BH NOTES
Attempted to consult  with patient but she is very drowsy and unable to participate meaningfully in the assessment. The nurse reports that she has just had some ativan for withdrawal.   Please re-consult when patient is more lucid. Continue with sitter until seen by psych.

## 2023-02-08 NOTE — PROGRESS NOTES
End of Shift Note    Bedside shift change report given to RN (oncoming nurse) by Jeanette Cartagena RN (offgoing nurse). Report included the following information SBAR, Kardex, ED Summary, Procedure Summary, Intake/Output, MAR, Recent Results, Med Rec Status, and Cardiac Rhythm Sinus Tacy    Shift worked:  7p-7a     Shift summary and any significant changes:    Pt stated she was having suicidal ideations, notified dr and proper orders placed. Gave PRN ativan X2 for CIWA. Psych consult, pt was too drowsy to have talk with provider and take night time med. Will attempt tomorrow.  Sitter at bedside     Concerns for physician to address:       Zone phone for oncoming shift:

## 2023-02-08 NOTE — PROGRESS NOTES
Problem: Pressure Injury - Risk of  Goal: *Prevention of pressure injury  Description: Document Kelvin Scale and appropriate interventions in the flowsheet. Outcome: Progressing Towards Goal  Note: Pressure Injury Interventions:  Sensory Interventions: Assess changes in LOC, Assess need for specialty bed    Moisture Interventions: Absorbent underpads    Activity Interventions: Assess need for specialty bed    Mobility Interventions: Assess need for specialty bed    Nutrition Interventions: Document food/fluid/supplement intake    Friction and Shear Interventions: Apply protective barrier, creams and emollients, Feet elevated on foot rest, Foam dressings/transparent film/skin sealants                Problem: Patient Education: Go to Patient Education Activity  Goal: Patient/Family Education  Outcome: Progressing Towards Goal     Problem: Falls - Risk of  Goal: *Absence of Falls  Description: Document [facility] Fall Risk and appropriate interventions in the flowsheet.   Outcome: Progressing Towards Goal  Note: Fall Risk Interventions:                                Problem: Patient Education: Go to Patient Education Activity  Goal: Patient/Family Education  Outcome: Progressing Towards Goal     Problem: Risk for Elopement  Goal: Patient will not exit the unit/facility without proper escort  Outcome: Progressing Towards Goal     Problem: Breathing Pattern - Ineffective  Goal: *Absence of hypoxia  Outcome: Progressing Towards Goal  Goal: *Use of effective breathing techniques  Outcome: Progressing Towards Goal  Goal: *PALLIATIVE CARE:  Alleviation of Dyspnea  Outcome: Progressing Towards Goal     Problem: Patient Education: Go to Patient Education Activity  Goal: Patient/Family Education  Outcome: Progressing Towards Goal     Problem: Suicide  Goal: *STG: Remains safe in hospital  Outcome: Progressing Towards Goal  Goal: *STG: Seeks staff when feelings of self harm or harm towards others arise  Outcome: Progressing Towards Goal  Goal: *STG: Attends activities and groups  Outcome: Progressing Towards Goal  Goal: *STG:  Verbalizes alternative ways of dealing with maladaptive feelings/behaviors  Outcome: Progressing Towards Goal  Goal: *STG/LTG: Complies with medication therapy  Outcome: Progressing Towards Goal  Goal: *STG/LTG: No longer expresses self destructive or suicidal thoughts  Outcome: Progressing Towards Goal  Goal: *LTG:  Identifies available community resources  Outcome: Progressing Towards Goal  Goal: *LTG:  Develops proactive suicide prevention plan  Outcome: Progressing Towards Goal  Goal: Interventions  Outcome: Progressing Towards Goal     Problem: Patient Education: Go to Patient Education Activity  Goal: Patient/Family Education  Outcome: Progressing Towards Goal

## 2023-02-08 NOTE — PROGRESS NOTES
Problem: Pressure Injury - Risk of  Goal: *Prevention of pressure injury  Description: Document Kelvin Scale and appropriate interventions in the flowsheet. Outcome: Progressing Towards Goal  Note: Pressure Injury Interventions:  Sensory Interventions: Assess changes in LOC, Assess need for specialty bed, Discuss PT/OT consult with provider, Float heels, Keep linens dry and wrinkle-free, Maintain/enhance activity level, Minimize linen layers, Pad between skin to skin, Turn and reposition approx. every two hours (pillows and wedges if needed)    Moisture Interventions: Absorbent underpads, Apply protective barrier, creams and emollients, Check for incontinence Q2 hours and as needed, Assess need for specialty bed, Internal/External urinary devices    Activity Interventions: Assess need for specialty bed, Chair cushion, Increase time out of bed, PT/OT evaluation    Mobility Interventions: Assess need for specialty bed, HOB 30 degrees or less, PT/OT evaluation    Nutrition Interventions: Document food/fluid/supplement intake, Discuss nutritional consult with provider, Offer support with meals,snacks and hydration    Friction and Shear Interventions: Apply protective barrier, creams and emollients, Minimize layers      Problem: Falls - Risk of  Goal: *Absence of Falls  Description: Document Mic Avalos Fall Risk and appropriate interventions in the flowsheet.   Outcome: Progressing Towards Goal  Note: Fall Risk Interventions:      Problem: Risk for Elopement  Goal: Patient will not exit the unit/facility without proper escort  Outcome: Progressing Towards Goal     Problem: Breathing Pattern - Ineffective  Goal: *Absence of hypoxia  Outcome: Progressing Towards Goal  Goal: *Use of effective breathing techniques  Outcome: Progressing Towards Goal

## 2023-02-08 NOTE — BH NOTES
Attempted to consult with the patient but the nurse has to go look for the zoom computer. Will try again.

## 2023-02-08 NOTE — PROGRESS NOTES
1640: Patient arrived to 2151 on [facility].     1900:   End of Shift Note    Bedside shift change report given to Michaelle Gomes RN (oncoming nurse) by Nathalie Clements RN (offgoing nurse). Report included the following information SBAR, Kardex, Intake/Output, MAR, and Recent Results    Shift worked:  1600--1900     Shift summary and any significant changes:     No significant changes. Concerns for physician to address:       Zone phone for oncoming shift:          Activity:  Activity Level:  Up with Assistance  Number times ambulated in hallways past shift: 0  Number of times OOB to chair past shift: 2    Cardiac:   Cardiac Monitoring: Yes      Cardiac Rhythm: Sinus Tachy    Access:  Current line(s): PIV     Genitourinary:   Urinary status: voiding and incontinent    Respiratory:   O2 Device: Nasal cannula  Chronic home O2 use?: NO  Incentive spirometer at bedside: YES       GI:  Last Bowel Movement Date: 02/07/23  Current diet:  ADULT DIET Regular; 4 carb choices (60 gm/meal)  Passing flatus: YES  Tolerating current diet: YES       Pain Management:   Patient states pain is manageable on current regimen: N/A    Skin:  Kelvin Score: 17  Interventions: increase time out of bed, PT/OT consult, internal/external urinary devices, and nutritional support     Patient Safety:  Fall Score:    Interventions: bed/chair alarm, assistive device (walker, cane, etc), gripper socks, and pt to call before getting OOB       Length of Stay:  Expected LOS: - - -  Actual LOS: 0      Nathalie Clements RN

## 2023-02-08 NOTE — PROGRESS NOTES
Problem: Self Care Deficits Care Plan (Adult)  Goal: *Acute Goals and Plan of Care (Insert Text)  Description: FUNCTIONAL STATUS PRIOR TO ADMISSION: Patient questionable historian secondary to mild confusion but reported being independent in ADLs and functional mobility. HOME SUPPORT: The patient lived with mother. Per chart review, she is her mother's caregiver. Occupational Therapy Goals  Initiated 2/8/2023  1. Patient will perform grooming standing at sink with modified independence within 7 day(s). 2.  Patient will perform upper body dressing with modified independence within 7 day(s). 3.  Patient will perform lower body dressing with modified independence within 7 day(s). 4.  Patient will perform toilet transfers with modified independence within 7 day(s). 5.  Patient will perform all aspects of toileting with modified independence within 7 day(s). Outcome: Not Met   OCCUPATIONAL THERAPY EVALUATION  Patient: Isaias Shaw (58 y.o. female)  Date: 2/8/2023  Primary Diagnosis: Acute respiratory distress [R06.03]  Hyponatremia [E87.1]       Precautions: Fall, Bed Alarm    ASSESSMENT  Based on the objective data described below, the patient presents with decreased independence in self-care and functional mobility secondary to general weakness, impaired balance, confusion, drowsiness, decreased safety awareness/insight, impulsivity, decreased activity tolerance, and hx of EtOH abuse. Per chart review, patient appears to be functioning below her baseline for ADLs and functional mobility now completing ADLs with set-up to max assist and functional mobility with stand-by to min assist using rolling walker. Patient received semisupine in bed on 2L O2 with PCT present in room and opened eyes to tactile stimulus. Patient completed supine > sit with contact guard assist and impulsively started to stand to use the bathroom. Patient placed on RA as she was 100% on 2L O2.  She required min assist for bathroom mobility and transferred to toilet with contact guard assist. With cues for safety and redirection to stay on task, patient completed pericare/hygiene with contact guard assist. Grooming at sink deferred secondary to patient's poor insight and impulsivity. Patient returned to EOB and noted to have increased SOB. 2L O2 placed back on patient with pulse ox reading 93% following mobility. When asked if she wanted to sit in chair, she declined making a decision and returned to supine with stand-by assist. Patient was left semisupine in bed with all needs met, VSS, bed alarmed/PCT present in room, and lunch tray set-up. Patient would continue to benefit from skilled OT services during acute hospital stay. Current Level of Function Impacting Discharge (ADLs/self-care): set-up to max assist for ADLs, stand-by to min assist for functional mobility using rolling walker     Functional Outcome Measure: The patient scored 35/100 on the Barthel Index outcome measure which is indicative of being very dependent in ADLs. Other factors to consider for discharge: fall risk, EtOH, complicated social situation      Patient will benefit from skilled therapy intervention to address the above noted impairments. PLAN :  Recommendations and Planned Interventions: self care training, functional mobility training, therapeutic exercise, balance training, therapeutic activities, endurance activities, patient education, home safety training, and family training/education    Frequency/Duration: Patient will be followed by occupational therapy 3 times a week to address goals.     Recommendation for discharge: (in order for the patient to meet his/her long term goals)  To be determined: pending progress    This discharge recommendation:  Has not yet been discussed the attending provider and/or case management    IF patient discharges home will need the following DME: TBD pending progress       SUBJECTIVE:   Patient stated I don't know what I want lady.     OBJECTIVE DATA SUMMARY:   HISTORY:   No past medical history on file. No past surgical history on file. Expanded or extensive additional review of patient history:     Home Situation  Support Systems: Friend/Neighbor (Pt is her mother's caregiver. She has 2 brothers, but pt does not endorse their support. Pt endorses support from close friend, [facility])  Patient Expects to be Discharged to[de-identified] Home    Hand dominance: Right    EXAMINATION OF PERFORMANCE DEFICITS:  Cognitive/Behavioral Status:  Neurologic State: Drowsy  Orientation Level: Oriented to person;Oriented to place; Disoriented to situation;Disoriented to time  Cognition: Decreased command following; Impaired decision making; Impulsive;Poor safety awareness  Perception: Appears intact  Perseveration: No perseveration noted  Safety/Judgement: Awareness of environment;Decreased insight into deficits; Fall prevention    Hearing: Auditory  Auditory Impairment: None    Vision/Perceptual:    Acuity:  (unable to formally assess)      Range of Motion:  AROM: Generally decreased, functional    Strength:  Strength: Generally decreased, functional    Coordination:  Coordination: Generally decreased, functional  Fine Motor Skills-Upper: Left Intact; Right Intact    Gross Motor Skills-Upper: Left Intact; Right Intact    Tone & Sensation:  Tone: Normal    Balance:  Sitting: Intact  Standing: Impaired  Standing - Static: Fair  Standing - Dynamic : Fair    Functional Mobility and Transfers for ADLs:  Bed Mobility:  Rolling: Contact guard assistance  Supine to Sit: Contact guard assistance  Sit to Supine: Contact guard assistance  Scooting: Stand-by assistance    Transfers:  Sit to Stand: Contact guard assistance  Stand to Sit: Contact guard assistance  Bathroom Mobility: Minimum assistance  Toilet Transfer : Contact guard assistance    ADL Assessment:  Feeding: Setup  Oral Facial Hygiene/Grooming: Setup;Stand-by assistance (seated)  Bathing: Minimum assistance  Upper Body Dressing: Minimum assistance  Lower Body Dressing: Maximum assistance  Toileting: Minimum assistance    ADL Intervention and task modifications:    Grooming  Position Performed: Seated edge of bed  Washing Hands: Set-up; Stand-by assistance  Cues: Verbal cues provided    Toileting  Bladder Hygiene: Contact guard assistance  Clothing Management: Minimum assistance  Cues: Tactile cues provided;Verbal cues provided    Cognitive Retraining  Safety/Judgement: Awareness of environment;Decreased insight into deficits; Fall prevention    Functional Measure:    Barthel Index:  Bathin  Bladder: 5  Bowels: 5  Groomin  Dressin  Feedin  Mobility: 0  Stairs: 0  Toilet Use: 5  Transfer (Bed to Chair and Back): 10  Total: 35/100      The Barthel ADL Index: Guidelines  1. The index should be used as a record of what a patient does, not as a record of what a patient could do. 2. The main aim is to establish degree of independence from any help, physical or verbal, however minor and for whatever reason. 3. The need for supervision renders the patient not independent. 4. A patient's performance should be established using the best available evidence. Asking the patient, friends/relatives and nurses are the usual sources, but direct observation and common sense are also important. However direct testing is not needed. 5. Usually the patient's performance over the preceding 24-48 hours is important, but occasionally longer periods will be relevant. 6. Middle categories imply that the patient supplies over 50 per cent of the effort. 7. Use of aids to be independent is allowed. Score Interpretation (from [address] [address])    Independent   60-79 Minimally independent   40-59 Partially dependent   20-39 Very dependent   <20 Totally dependent     -Shan Lilly, Barthel, D.W. (1965). Functional evaluation: the Barthel Index. [address] [facility] ([address] (1997).  The Barthel activities of daily living index: self-reporting versus actual performance in the old (> or = 75 years). Journal of [address] [place] [id number] 457), [facility], ALBERT.F, Miko Lim.Eloina. (1999). Measuring the change in disability after inpatient rehabilitation; comparison of the responsiveness of the Barthel Index and Functional [place] Measure. Journal of Neurology, Neurosurgery, and Psychiatry, 66(4), 271-960. SHELL Prabhakar, NENA Ryan, & Ambrosio Carl M.A. (2004) Assessment of post-stroke quality of life in cost-effectiveness studies: The usefulness of the Barthel Index and the EuroQoL-5D. Quality of Life Research, 13, 173-85      Based on the above components, the patient evaluation is determined to be of the following complexity level: MEDIUM  Pain Rating:  Patient did not c/o pain during session. Activity Tolerance:   Fair, Poor, desaturates with exertion and requires oxygen, and requires rest breaks    After treatment patient left in no apparent distress:    Supine in bed, Call bell within reach, Bed / chair alarm activated, Side rails x 3, and PCT present in room    COMMUNICATION/EDUCATION:   The patients plan of care was discussed with: Physical therapist and Registered nurse. Home safety education was provided and the patient/caregiver indicated understanding., Patient/family have participated as able in goal setting and plan of care. , and Patient/family agree to work toward stated goals and plan of care. This patients plan of care is appropriate for delegation to [facility].     Thank you for this referral.  Maddie Sheth OTR/L  Time Calculation: 19 mins

## 2023-02-08 NOTE — PROGRESS NOTES
CM note:    [facility] attempted to see patient yesterday - they will attempt again - CM called [facility] at [phone]- patient is active with their clinic and was seen about 1 year ago - provider is Dr. Nayeli Bone- will add information to medical record- noted psyc consult pending at this time. ABIGAIL Cruz CM spoke with  at [employer] - they show patients address is [address] [place] [address] [phone] with a contact number of [phone]. The contact info that needs to be verified by patient is janet Palumbo (?spouse/) at [phone]. CM noted PT/OT and psyc consult pending at this time. Patient is uninsured at this time.  ABIGAIL Cruz

## 2023-02-08 NOTE — PROGRESS NOTES
Problem: Mobility Impaired (Adult and Pediatric)  Goal: *Acute Goals and Plan of Care (Insert Text)  Description: FUNCTIONAL STATUS PRIOR TO ADMISSION: Patient was independent and active without use of DME.    HOME SUPPORT PRIOR TO ADMISSION: Per chart patient lived with mother. Possibly caregiver for mother    Physical Therapy Goals  Initiated 2/8/2023  1. Patient will move from supine to sit and sit to supine  in bed with supervision within 7 day(s). 2.  Patient will transfer from bed to chair and chair to bed with CGA using the least restrictive device within 7 day(s). 3.  Patient will perform sit to stand with CGA within 7 day(s). 4.  Patient will ambulate with CGA for 250 feet with the least restrictive device within 7 day(s). Outcome: Progressing Towards Goal   PHYSICAL THERAPY EVALUATION  Patient: Isidra Clifford (58 y.o. female)  Date: 2/8/2023  Primary Diagnosis: Acute respiratory distress [R06.03]  Hyponatremia [E87.1]       Precautions:   Fall, Bed Alarm    ASSESSMENT  Based on the objective data described below, the patient presents with decreased functional mobility from baseline level of function. Patient currently limited by confusion, agitation, generalized weakness, and decreased activity tolerance. Patient currently sleeping in bed and needs max cues to stay awake. Overall needing supervision to come to EOB. Sit to stand with Marilin to come to stand and able to amb to the bathroom with Marilin. She is very unsteady and did better with RW to return back to bed. Patient is well below baseline and may need some additional rehab to progress to more independent level of function. However, noted that patient does not have insurance at this time and poor social situation. Other factors to consider for discharge: at risk for falls, below baseline     Patient will benefit from skilled therapy intervention to address the above noted impairments.        PLAN :  Recommendations and Planned Interventions: bed mobility training, transfer training, gait training, therapeutic exercises, neuromuscular re-education, patient and family training/education, and therapeutic activities      Frequency/Duration: Patient will be followed by physical therapy:  3 times a week to address goals. Recommendation for discharge: (in order for the patient to meet his/her long term goals)  To be determined: pending mobility progression      IF patient discharges home will need the following DME: to be determined (TBD)         SUBJECTIVE:   Patient stated I want to talk my mama.     OBJECTIVE DATA SUMMARY:   HISTORY:    No past medical history on file. No past surgical history on file. Personal factors and/or comorbidities impacting plan of care:     Home Situation  Support Systems: Friend/Neighbor (Pt is her mother's caregiver. She has 2 brothers, but pt does not endorse their support. Pt endorses support from close friend, [facility])  Patient Expects to be Discharged to[de-identified] Home    EXAMINATION/PRESENTATION/DECISION MAKING:   Critical Behavior:  Neurologic State: Drowsy  Orientation Level: Oriented to person, Oriented to place, Disoriented to situation, Disoriented to time  Cognition: Decreased command following, Impaired decision making, Impulsive, Poor safety awareness  Safety/Judgement: Awareness of environment, Decreased insight into deficits, Fall prevention  Hearing:   Auditory  Auditory Impairment: None    Range Of Motion:  AROM: Generally decreased, functional                       Strength:    Strength: Generally decreased, functional                    Tone & Sensation:   Tone: Normal                              Coordination:  Coordination: Generally decreased, functional  Vision:   Acuity:  (unable to formally assess)  Functional Mobility:  Bed Mobility:  Rolling: Contact guard assistance  Supine to Sit: Contact guard assistance  Sit to Supine: Contact guard assistance  Scooting: Stand-by assistance  Transfers:  Sit to Stand: Contact guard assistance  Stand to Sit: Contact guard assistance                       Balance:   Sitting: Intact  Standing: Impaired  Standing - Static: Fair  Standing - Dynamic : Fair  Ambulation/Gait Training:  Distance (ft): 15 Feet (ft) (x 2)  Assistive Device: Gait belt (one trial with RW and one without)  Ambulation - Level of Assistance: Minimal assistance;Assist x2     Gait Description (WDL): Exceptions to WDL  Gait Abnormalities: Decreased step clearance;Shuffling gait        Base of Support: Widened     Speed/Maria Luisa: Slow;Pace decreased (<100 feet/min); Shuffled  Step Length: Left shortened;Right shortened       Pain Rating:  No c/o pain    Activity Tolerance:   Fair and requires rest breaks    After treatment patient left in no apparent distress:   Sitting in chair, Call bell within reach, and Caregiver / family present    COMMUNICATION/EDUCATION:   The patients plan of care was discussed with: Physical therapist, Occupational therapist, and Registered nurse. Fall prevention education was provided and the patient/caregiver indicated understanding., Patient/family have participated as able in goal setting and plan of care. , and Patient/family agree to work toward stated goals and plan of care.     Thank you for this referral.  Jeremy Ward, PT, DPT   Time Calculation: 19 mins

## 2023-02-08 NOTE — PROGRESS NOTES
Problem: Pressure Injury - Risk of  Goal: *Prevention of pressure injury  Description: Document Kelvin Scale and appropriate interventions in the flowsheet. Outcome: Progressing Towards Goal  Note: Pressure Injury Interventions:  Sensory Interventions: Assess changes in LOC, Assess need for specialty bed    Moisture Interventions: Absorbent underpads    Activity Interventions: Assess need for specialty bed    Mobility Interventions: Assess need for specialty bed    Nutrition Interventions: Document food/fluid/supplement intake    Friction and Shear Interventions: Apply protective barrier, creams and emollients, Feet elevated on foot rest, Foam dressings/transparent film/skin sealants                Problem: Patient Education: Go to Patient Education Activity  Goal: Patient/Family Education  Outcome: Progressing Towards Goal     Problem: Falls - Risk of  Goal: *Absence of Falls  Description: [facility] and appropriate interventions in the flowsheet.   Outcome: Progressing Towards Goal  Note: Fall Risk Interventions:                                Problem: Patient Education: Go to Patient Education Activity  Goal: Patient/Family Education  Outcome: Progressing Towards Goal     Problem: Risk for Elopement  Goal: Patient will not exit the unit/facility without proper escort  Outcome: Progressing Towards Goal     Problem: Breathing Pattern - Ineffective  Goal: *Absence of hypoxia  Outcome: Progressing Towards Goal  Goal: *Use of effective breathing techniques  Outcome: Progressing Towards Goal  Goal: *PALLIATIVE CARE:  Alleviation of Dyspnea  Outcome: Progressing Towards Goal     Problem: Patient Education: Go to Patient Education Activity  Goal: Patient/Family Education  Outcome: Progressing Towards Goal

## 2023-02-09 LAB
ALBUMIN SERPL-MCNC: 2.6 G/DL (ref 3.5–5)
ALBUMIN/GLOB SERPL: 0.7 (ref 1.1–2.2)
ALP SERPL-CCNC: 87 U/L (ref 45–117)
ALT SERPL-CCNC: 24 U/L (ref 12–78)
ANION GAP SERPL CALC-SCNC: 3 MMOL/L (ref 5–15)
ANION GAP SERPL CALC-SCNC: 3 MMOL/L (ref 5–15)
AST SERPL-CCNC: 42 U/L (ref 15–37)
BILIRUB SERPL-MCNC: 0.9 MG/DL (ref 0.2–1)
BUN SERPL-MCNC: 9 MG/DL (ref 6–20)
BUN SERPL-MCNC: 9 MG/DL (ref 6–20)
BUN/CREAT SERPL: 21 (ref 12–20)
BUN/CREAT SERPL: 24 (ref 12–20)
CALCIUM SERPL-MCNC: 8.4 MG/DL (ref 8.5–10.1)
CALCIUM SERPL-MCNC: 8.6 MG/DL (ref 8.5–10.1)
CHLORIDE SERPL-SCNC: 80 MMOL/L (ref 97–108)
CHLORIDE SERPL-SCNC: 81 MMOL/L (ref 97–108)
CO2 SERPL-SCNC: 37 MMOL/L (ref 21–32)
CO2 SERPL-SCNC: 40 MMOL/L (ref 21–32)
CREAT SERPL-MCNC: 0.37 MG/DL (ref 0.55–1.02)
CREAT SERPL-MCNC: 0.43 MG/DL (ref 0.55–1.02)
GLOBULIN SER CALC-MCNC: 3.6 G/DL (ref 2–4)
GLUCOSE SERPL-MCNC: 106 MG/DL (ref 65–100)
GLUCOSE SERPL-MCNC: 91 MG/DL (ref 65–100)
MAGNESIUM SERPL-MCNC: 1.6 MG/DL (ref 1.6–2.4)
PHOSPHATE SERPL-MCNC: 3.4 MG/DL (ref 2.6–4.7)
POTASSIUM SERPL-SCNC: 3.8 MMOL/L (ref 3.5–5.1)
POTASSIUM SERPL-SCNC: 5.4 MMOL/L (ref 3.5–5.1)
PROT SERPL-MCNC: 6.2 G/DL (ref 6.4–8.2)
SODIUM SERPL-SCNC: 120 MMOL/L (ref 136–145)
SODIUM SERPL-SCNC: 124 MMOL/L (ref 136–145)

## 2023-02-09 PROCEDURE — 94640 AIRWAY INHALATION TREATMENT: CPT

## 2023-02-09 PROCEDURE — 84100 ASSAY OF PHOSPHORUS: CPT

## 2023-02-09 PROCEDURE — 74011250637 HC RX REV CODE- 250/637: Performed by: INTERNAL MEDICINE

## 2023-02-09 PROCEDURE — 74011250636 HC RX REV CODE- 250/636: Performed by: NURSE PRACTITIONER

## 2023-02-09 PROCEDURE — 74011250637 HC RX REV CODE- 250/637: Performed by: NURSE PRACTITIONER

## 2023-02-09 PROCEDURE — 77010033678 HC OXYGEN DAILY

## 2023-02-09 PROCEDURE — 65270000046 HC RM TELEMETRY

## 2023-02-09 PROCEDURE — 74011250636 HC RX REV CODE- 250/636: Performed by: INTERNAL MEDICINE

## 2023-02-09 PROCEDURE — 74011636637 HC RX REV CODE- 636/637: Performed by: INTERNAL MEDICINE

## 2023-02-09 PROCEDURE — 36415 COLL VENOUS BLD VENIPUNCTURE: CPT

## 2023-02-09 PROCEDURE — 80053 COMPREHEN METABOLIC PANEL: CPT

## 2023-02-09 PROCEDURE — 83735 ASSAY OF MAGNESIUM: CPT

## 2023-02-09 PROCEDURE — 74011000250 HC RX REV CODE- 250: Performed by: NURSE PRACTITIONER

## 2023-02-09 PROCEDURE — 74011000250 HC RX REV CODE- 250: Performed by: INTERNAL MEDICINE

## 2023-02-09 RX ORDER — SODIUM CHLORIDE 9 MG/ML
75 INJECTION, SOLUTION INTRAVENOUS CONTINUOUS
Status: DISCONTINUED | OUTPATIENT
Start: 2023-02-09 | End: 2023-02-11

## 2023-02-09 RX ORDER — MAGNESIUM SULFATE 1 G/100ML
1 INJECTION INTRAVENOUS ONCE
Status: COMPLETED | OUTPATIENT
Start: 2023-02-09 | End: 2023-02-09

## 2023-02-09 RX ADMIN — CASTOR OIL AND BALSAM, PERU: 788; 87 OINTMENT TOPICAL at 09:00

## 2023-02-09 RX ADMIN — ENOXAPARIN SODIUM 40 MG: 100 INJECTION SUBCUTANEOUS at 08:56

## 2023-02-09 RX ADMIN — MAGNESIUM SULFATE HEPTAHYDRATE 1 G: 1 INJECTION, SOLUTION INTRAVENOUS at 08:43

## 2023-02-09 RX ADMIN — PREDNISONE 40 MG: 20 TABLET ORAL at 08:56

## 2023-02-09 RX ADMIN — FOLIC ACID 1 MG: 1 TABLET ORAL at 08:56

## 2023-02-09 RX ADMIN — THIAMINE HCL TAB 100 MG 100 MG: 100 TAB at 08:56

## 2023-02-09 RX ADMIN — ARFORMOTEROL TARTRATE: 15 SOLUTION RESPIRATORY (INHALATION) at 21:58

## 2023-02-09 RX ADMIN — THERA TABS 1 TABLET: TAB at 08:56

## 2023-02-09 RX ADMIN — CASTOR OIL AND BALSAM, PERU: 788; 87 OINTMENT TOPICAL at 22:46

## 2023-02-09 RX ADMIN — SODIUM CHLORIDE 75 ML/HR: 9 INJECTION, SOLUTION INTRAVENOUS at 22:44

## 2023-02-09 RX ADMIN — SODIUM CHLORIDE, PRESERVATIVE FREE 10 ML: 5 INJECTION INTRAVENOUS at 18:16

## 2023-02-09 RX ADMIN — Medication 1000 MG: at 18:16

## 2023-02-09 RX ADMIN — SODIUM CHLORIDE, PRESERVATIVE FREE 10 ML: 5 INJECTION INTRAVENOUS at 07:50

## 2023-02-09 RX ADMIN — SODIUM CHLORIDE 75 ML/HR: 9 INJECTION, SOLUTION INTRAVENOUS at 08:42

## 2023-02-09 RX ADMIN — ARFORMOTEROL TARTRATE: 15 SOLUTION RESPIRATORY (INHALATION) at 07:29

## 2023-02-09 RX ADMIN — SODIUM CHLORIDE, PRESERVATIVE FREE 10 ML: 5 INJECTION INTRAVENOUS at 22:46

## 2023-02-09 RX ADMIN — Medication 1000 MG: at 08:56
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
## 2023-02-09 NOTE — PROGRESS NOTES
End of Shift Note    Bedside shift change report given to Michael Jones RN (oncoming nurse) by Jose Jennings RN (offgoing nurse).   Report included the following information SBAR, Kardex, ED Summary, Intake/Output, MAR, and Recent Results    Shift worked:  night     Shift summary and any significant changes:     Patient remains suicidal overnight, 1:1 sitter remains at bedside     Remains on 2L O2 NC, unsuccessful weaning overnight     For repeat psych eval today, no ativan given overnight for CIWA     Concerns for physician to address:  See above     Zone phone for oncoming shift:   [phone]         Jose Jennings, RN

## 2023-02-09 NOTE — CONSULTS
INITIAL PSYCHIATRIC INTERVIEW    CHIEF COMPLAINT:    \"I am scared\"    HISTORY OF PRESENTING COMPLAINT:  Michaelle Riley is a 61 y.o. UNAVAILABLE female who is currently admitted to the medical floor at [facility]. Patient reports that she is at a point in her life where she does not know where she is going to go. She states that she and her mother now have to move out of their 3-story home into a one bedroom apt. The [employer] auctioned the home off. She reports that she and her mother just support each other. Patient acknowledges feeling despair and walking into traffic. She states this is the first time anything like this has ever happened. She reports feeling overwhelmed and not knowing what to do at that time. Currently, she is future-oriented and discusses what she needs to do when she discharges to take care of herself and her mother. PAST PSYCHIATRIC HISTORY and SUBSTANCE ABUSE HISTORY:  Alcohol:2-3 40 oz per day x one week, she denies any withdrawal symptoms. Nicotine: cigarettes      PAST MEDICAL HISTORY:  Please see H&P for details. No past medical history on file.   Prior to Admission medications    Not on File     Vitals:    02/09/23 0440 02/09/23 0452 02/09/23 0725 02/09/23 1102   BP: 130/70  (!) 145/94 118/69   Pulse: (!) 114  (!) 102 98   Resp: 18  18 16   Temp: 98.2 °F (36.8 °C)  98.5 °F (36.9 °C) 98.1 °F (36.7 °C)   SpO2: (!) 89% 95% 97% 97%   Weight:       Height:         Lab Results   Component Value Date/Time    WBC 4.3 02/08/2023 02:58 AM    HGB 12.9 02/08/2023 02:58 AM    HCT 37.9 02/08/2023 02:58 AM    PLATELET 570 (L) 09/14/2408 02:58 AM    MCV 94.8 02/08/2023 02:58 AM     Lab Results   Component Value Date/Time    Sodium 124 (L) 02/09/2023 12:10 PM    Potassium 3.8 02/09/2023 12:10 PM    Chloride 81 (L) 02/09/2023 12:10 PM    CO2 40 (H) 02/09/2023 12:10 PM    Anion gap 3 (L) 02/09/2023 12:10 PM    Glucose 106 (H) 02/09/2023 12:10 PM    BUN 9 02/09/2023 12:10 PM    Creatinine 0.37 (L) 02/09/2023 12:10 PM    BUN/Creatinine ratio 24 (H) 02/09/2023 12:10 PM    GFR est AA >60 01/20/2022 02:30 PM    GFR est non-AA >60 01/20/2022 02:30 PM    Calcium 8.4 (L) 02/09/2023 12:10 PM    Bilirubin, total 0.9 02/09/2023 04:33 AM    Alk. phosphatase 87 02/09/2023 04:33 AM    Protein, total 6.2 (L) 02/09/2023 04:33 AM    Albumin 2.6 (L) 02/09/2023 04:33 AM    Globulin 3.6 02/09/2023 04:33 AM    A-G Ratio 0.7 (L) 02/09/2023 04:33 AM    ALT (SGPT) 24 02/09/2023 04:33 AM    AST (SGOT) 42 (H) 02/09/2023 04:33 AM     No results found for: VALF2, VALAC, VALP, VALPR, DS6, CRBAM, CRBAMP, CARB2, XCRBAM  No results found for: LITHM  RADIOLOGY REPORTS:(reviewed/updated 2/9/2023)  CTA CHEST W OR W WO CONT    Result Date: 2/7/2023  EXAM:  CTA CHEST W OR W WO CONT INDICATION:  r/o pe. Additional history: COMPARISON: Chest x-ray, 2/6/2023. [facility] TECHNIQUE: Precontrast  images were obtained to localize the volume for acquisition. Multislice helical CT arteriography was performed from the diaphragm to the thoracic inlet during uneventful rapid bolus intravenous contrast administration. Lung and soft tissue windows were generated. Coronal and sagittal images were generated and 3D post processing consisting of coronal maximum intensity images was performed. CT dose reduction was achieved through use of a standardized protocol tailored for this examination and automatic exposure control for dose modulation. Jovana Keyur FINDINGS: CHEST: Chest wall/thoracic inlet: Within normal limits. Thyroid: Within normal limits. Mediastinum/simba: Patulous esophagus. Heart/vessels: Within normal limits. Coronary artery calcifications: Present Lungs/Pleura: Partial atelectasis/scarring in the right middle lobe. Partially opacified bronchioles supplying the posterior aspect of the left lower lobe . INCIDENTALLY IMAGED ABDOMEN: Incidentally imaged portions of the abdomen appear unremarkable.  . MSK: Compression of multiple vertebral bodies, most pronounced at T8, T9 and T11. Multiple, remote, bilateral rib fractures. There appears to be an acute right, posterior 9th rib fracture . 1. No visualized pulmonary embolus. 2. Acute, right, 9th rib fracture. 3. Multiple, bilateral, remote rib fractures. 4. Compression of multiple vertebral bodies. 5. Partial atelectasis/scarring in the right middle lobe. 5. Partially opacified bronchioles supplying the posterior aspect of the left lower lobe suggesting mucus plugging and/or bronchitis. XR CHEST PORT    Result Date: 2/6/2023  EXAM:  XR CHEST PORT INDICATION: Shortness of breath COMPARISON: none TECHNIQUE: Portable chest AP view FINDINGS: The cardiac silhouette is within normal limits. The lungs and pleural spaces are clear. There are old, healed bilateral rib fractures. No acute process. No results found for: Frankey Ares, [id number], [id number], PREGU, POCHCG, MHCGN, HCGQR, THCGA1, SHCG, HCGN, HCGSERUM, HCGURQLPOC       PSYCHOSOCIAL HISTORY:  Patient resides w/ mother who is disabled. She reports that she and her mother is being put out of their [place] has been auctioned by bank. Has one brother \"not supportive\". MENTAL STATUS EXAM:  General appearance:  fairly  groomed, psychomotor activity is   Eye contact: good eye contact  Speech: Spontaneous, soft regular rate and rhythm  Affect : Depressed, decreased range  Mood: \"depressed \"  Thought Process: Logical, goal directed  Perception: Denies AH or VH. Thought Content:Denies SI or Plan  Insight: Partial  Judgement: Fair  Cognition: Intact grossly. ASSESSMENT AND PLAN:  Gillian Morgan meets criteria for a diagnosis of Adjustment Disorder, Alcohol use disorder. Recommendations: Continue CIWA precautions, discharge to home with outpatient psychiatric services when medically cleared. Patient does not appear to be of harm to self.

## 2023-02-09 NOTE — PROGRESS NOTES
Problem: Pressure Injury - Risk of  Goal: *Prevention of pressure injury  Description: Document Kelvin Scale and appropriate interventions in the flowsheet. Outcome: Progressing Towards Goal  Note: Pressure Injury Interventions:  Sensory Interventions: Assess changes in LOC, Assess need for specialty bed    Moisture Interventions: Absorbent underpads    Activity Interventions: Assess need for specialty bed    Mobility Interventions: Assess need for specialty bed    Nutrition Interventions: Document food/fluid/supplement intake    Friction and Shear Interventions: Apply protective barrier, creams and emollients, Feet elevated on foot rest, Foam dressings/transparent film/skin sealants    Problem: Falls - Risk of  Goal: *Absence of Falls  Description: Document Caitlin Fall Risk and appropriate interventions in the flowsheet.   Outcome: Progressing Towards Goal  Note: Fall Risk Interventions:       Problem: Risk for Elopement  Goal: Patient will not exit the unit/facility without proper escort  Outcome: Progressing Towards Goal     Problem: Breathing Pattern - Ineffective  Goal: *Absence of hypoxia  Outcome: Progressing Towards Goal  Goal: *Use of effective breathing techniques  Outcome: Progressing Towards Goal     Problem: Suicide  Goal: *STG: Remains safe in hospital  Outcome: Progressing Towards Goal  Goal: *STG: Seeks staff when feelings of self harm or harm towards others arise  Outcome: Progressing Towards Goal  Goal: *STG: Attends activities and groups  Outcome: Progressing Towards Goal  Goal: *STG:  Verbalizes alternative ways of dealing with maladaptive feelings/behaviors  Outcome: Progressing Towards Goal  Goal: *STG/LTG: Complies with medication therapy  Outcome: Progressing Towards Goal  Goal: *STG/LTG: No longer expresses self destructive or suicidal thoughts  Outcome: Progressing Towards Goal  Goal: *LTG:  Identifies available community resources  Outcome: Progressing Towards Goal  Goal: *LTG:  Develops proactive suicide prevention plan  Outcome: Progressing Towards Goal  Goal: Interventions  Outcome: Progressing Towards Goal

## 2023-02-09 NOTE — PROGRESS NOTES
Problem: Pressure Injury - Risk of  Goal: *Prevention of pressure injury  Description: Document Kelvin Scale and appropriate interventions in the flowsheet. Outcome: Progressing Towards Goal  Note: Pressure Injury Interventions:  Sensory Interventions: Assess changes in LOC, Assess need for specialty bed, Avoid rigorous massage over bony prominences, Float heels, Keep linens dry and wrinkle-free, Maintain/enhance activity level, Minimize linen layers, Monitor skin under medical devices, Turn and reposition approx. every two hours (pillows and wedges if needed)    Moisture Interventions: Absorbent underpads, Apply protective barrier, creams and emollients, Assess need for specialty bed, Internal/External urinary devices, Limit adult briefs, Maintain skin hydration (lotion/cream), Minimize layers, Moisture barrier, Offer toileting Q_hr, Check for incontinence Q2 hours and as needed    Activity Interventions: Assess need for specialty bed, Pressure redistribution bed/mattress(bed type), Increase time out of bed    Mobility Interventions: Assess need for specialty bed, Float heels, HOB 30 degrees or less    Nutrition Interventions: Document food/fluid/supplement intake, Discuss nutritional consult with provider    Friction and Shear Interventions: Apply protective barrier, creams and emollients, Foam dressings/transparent film/skin sealants, HOB 30 degrees or less, Lift sheet, Minimize layers                Problem: Patient Education: Go to Patient Education Activity  Goal: Patient/Family Education  Outcome: Progressing Towards Goal     Problem: Falls - Risk of  Goal: *Absence of Falls  Description: Document Caitlin Fall Risk and appropriate interventions in the flowsheet.   Outcome: Progressing Towards Goal  Note: Fall Risk Interventions:                                Problem: Patient Education: Go to Patient Education Activity  Goal: Patient/Family Education  Outcome: Progressing Towards Goal     Problem: Risk for Elopement  Goal: Patient will not exit the unit/facility without proper escort  Outcome: Progressing Towards Goal     Problem: Breathing Pattern - Ineffective  Goal: *Absence of hypoxia  Outcome: Progressing Towards Goal  Goal: *Use of effective breathing techniques  Outcome: Progressing Towards Goal  Goal: *PALLIATIVE CARE:  Alleviation of Dyspnea  Outcome: Progressing Towards Goal     Problem: Patient Education: Go to Patient Education Activity  Goal: Patient/Family Education  Outcome: Progressing Towards Goal     Problem: Suicide  Goal: *STG: Remains safe in hospital  Outcome: Progressing Towards Goal  Goal: *STG: Seeks staff when feelings of self harm or harm towards others arise  Outcome: Progressing Towards Goal  Goal: *STG: Attends activities and groups  Outcome: Progressing Towards Goal  Goal: *STG:  Verbalizes alternative ways of dealing with maladaptive feelings/behaviors  Outcome: Progressing Towards Goal  Goal: *STG/LTG: Complies with medication therapy  Outcome: Progressing Towards Goal  Goal: *STG/LTG: No longer expresses self destructive or suicidal thoughts  Outcome: Progressing Towards Goal  Goal: *LTG:  Identifies available community resources  Outcome: Progressing Towards Goal  Goal: *LTG:  Develops proactive suicide prevention plan  Outcome: Progressing Towards Goal  Goal: Interventions  Outcome: Progressing Towards Goal     Problem: Patient Education: Go to Patient Education Activity  Goal: Patient/Family Education  Outcome: Progressing Towards Goal     Problem: Patient Education: Go to Patient Education Activity  Goal: Patient/Family Education  Outcome: Progressing Towards Goal     Problem: Patient Education: Go to Patient Education Activity  Goal: Patient/Family Education  Outcome: Progressing Towards Goal

## 2023-02-09 NOTE — PROGRESS NOTES
[facility]  Progress Note        NAME: Sonya Calix       :  1965       MRN:  [id number]     Date/Time: 2023    Risk of deterioration: medium       Assessment:    Plan:  Acute on chronic hyponatremia  ETOH  HTN  Psych  Mucous plugging/sob Sodium marla 118  Sodium 129 today  Stop neutrophos-k up  Ivf started-ns  I have reviewed records from [facility]-has been seen by nephrology there in past-working diagnosis etoh/increased water intake  No need for hot salt at this time  Psych to see for suicidal ideations  Repeat labs later today    CTA (-) PE         Subjective:     Chief Complaint:  I'm thirsty    Review of Systems: no n/v/cp/f/c  (+) cough/sob  (-) dysuria    Objective:     VITALS:   Last 24hrs VS reviewed since prior progress note. Most recent are:  Visit Vitals  /69 (BP 1 Location: Left upper arm, BP Patient Position: At rest)   Pulse 98   Temp 98.1 °F (36.7 °C)   Resp 16   Ht 5' 3\" (1.6 m)   Wt 54 kg (119 lb)   SpO2 97%   BMI 21.08 kg/m²     SpO2 Readings from Last 6 Encounters:   23 97%    O2 Flow Rate (L/min): 2 l/min   No intake or output data in the 24 hours ending 23 1147       Telemetry Reviewed       PHYSICAL EXAM:    General   well developed, chronically ill appearing wf  EENT  Normocephalic, Atraumatic,  EOMI, sclera clear,  Respiratory   Clear To Auscultation bilaterally  Cardiology  Regular Rate and Rythmn    Abdominal  Soft, non-tender, non-distended, positive bowel sounds  Extremities  No clubbing, cyanosis, or edema. Pulses intact.               Lab Data Reviewed: (see below)    Medications Reviewed: (see below)    PMH/SH reviewed - no change compared to H&P________________    Attending Physician: Jah Guerrero MD     ____________________________________________________  MEDICATIONS:  Current Facility-Administered Medications   Medication Dose Route Frequency    0.9% sodium chloride infusion  75 mL/hr IntraVENous CONTINUOUS    sodium chloride soluble tablet 1,000 mg  1 g Oral BID    albuterol-ipratropium (DUO-NEB) 2.5 MG-0.5 MG/3 ML  3 mL Nebulization Q4H PRN    arformoterol 15 mcg/budesonide 0.5 mg neb solution   Nebulization BID RT    predniSONE (DELTASONE) tablet 40 mg  40 mg Oral DAILY WITH BREAKFAST    therapeutic multivitamin (THERAGRAN) tablet 1 Tablet  1 Tablet Oral DAILY    folic acid (FOLVITE) tablet 1 mg  1 mg Oral DAILY    thiamine mononitrate (B-1) tablet 100 mg  100 mg Oral DAILY    sodium chloride (NS) flush 5-40 mL  5-40 mL IntraVENous Q8H    sodium chloride (NS) flush 5-40 mL  5-40 mL IntraVENous PRN    acetaminophen (TYLENOL) tablet 650 mg  650 mg Oral Q6H PRN    Or    acetaminophen (TYLENOL) suppository 650 mg  650 mg Rectal Q6H PRN    polyethylene glycol (MIRALAX) packet 17 g  17 g Oral DAILY PRN    ondansetron (ZOFRAN ODT) tablet 4 mg  4 mg Oral Q8H PRN    Or    ondansetron (ZOFRAN) injection 4 mg  4 mg IntraVENous Q6H PRN    enoxaparin (LOVENOX) injection 40 mg  40 mg SubCUTAneous DAILY    ipratropium (ATROVENT) 0.02 % nebulizer solution 0.5 mg  0.5 mg Nebulization Q4H PRN    levalbuterol (XOPENEX) nebulizer soln 1.25 mg/3 mL  1.25 mg Nebulization Q4H PRN    LORazepam (ATIVAN) injection 1 mg  1 mg IntraVENous Q2H PRN    LORazepam (ATIVAN) injection 2 mg  2 mg IntraVENous Q2H PRN    nicotine (NICODERM CQ) 21 mg/24 hr patch 1 Patch  1 Patch TransDERmal DAILY    balsam peru-castor oiL (VENELEX) ointment   Topical BID        LABS:  Recent Labs     02/08/23  0258 02/06/23  2209   WBC 4.3 3.4*   HGB 12.9 13.8   HCT 37.9 39.7   * 147*       Recent Labs     02/09/23  0433 02/08/23  0258 02/07/23  0748 02/07/23  0512   * 122* 124* 121*   K 5.4* 3.8 3.6 4.2   CL 80* 84* 89* 87*   CO2 37* 30 29 28   BUN 9 6 6 5*   CREA 0.43* 0.42* 0.54* 0.53*   GLU 91 75 162* 164*   CA 8.6 8.6 7.4* 7.9*   MG 1.6  --   --  1.7  1.7   PHOS 3.4  --   --  3.2  3.1       Recent Labs     02/09/23  0433 02/06/23  2209   ALT 24 28   AP 87 132*   TBILI 0.9 0.8   TP 6.2* 6.5   ALB 2. 6* 3.0*   GLOB 3.6 3.5       No results for input(s): INR, PTP, APTT, INREXT, INREXT in the last 72 hours. No results for input(s): FE, TIBC, PSAT, FERR in the last 72 hours. No results for input(s): PH, PCO2, PO2 in the last 72 hours. No results for input(s): CPK, CKNDX, TROIQ in the last 72 hours.     No lab exists for component: CPKMB  No results found for: Ozzie Ormond

## 2023-02-09 NOTE — PROGRESS NOTES
02/09/23 0440   Vitals   Temp 98.2 °F (36.8 °C)   Temp Source Oral   Pulse (Heart Rate) (!) 114   Heart Rate Source Monitor   Resp Rate 18   O2 Sat (%) (!) 89 %   Level of Consciousness 0   /70   MAP (Monitor) 86   MAP (Calculated) 90   BP 1 Location Left upper arm   BP 1 Method Automatic   BP Patient Position At rest   MEWS Score 3   Pain 1   Pain Scale 1 Numeric (0 - 10)   Pain Intensity 1 0   Patient Stated Pain Goal 0   Pain Reassessment 1 Yes   Oxygen Therapy   Pulse via Oximetry 114 beats per minute   O2 Device Nasal cannula   O2 Flow Rate (L/min) 1 l/min   MEWS 3 r/t HR, MD is aware, pt in no acute distress, will continue to monitor

## 2023-02-09 NOTE — PROGRESS NOTES
End of Shift Note    Bedside shift change report given to Rosa Joshi RN (oncoming nurse) by Chandrakant Tafoya RN (offgoing nurse). Report included the following information SBAR, Kardex, ED Summary, Procedure Summary, Intake/Output, MAR, Recent Results, Med Rec Status, and Cardiac Rhythm Sinus Rhythm    Shift worked:  7p-7a     Shift summary and any significant changes:    Pysch consult completed. Started IVF and redrew BMP. Unable to wean O2, still on 2L NC.  Continue with sitter at bedside     Concerns for physician to address:       Zone phone for oncoming shift:

## 2023-02-09 NOTE — PROGRESS NOTES
Transition of Care Plan:    RUR: 9%  Disposition:TBD - pending psyc/MD reccommendations    PT/OT is following    Barrier: Patient is uninsured - asking [employer] to screen patient   If SNF or IPR: Date FOC offered:  Date FOC received:  Date authorization started with reference number:  Date authorization received and expires:  Accepting facility:  Follow up appointments:PCP/Speciliast  DME needed: TBD  Transportation at [facility] friend  Vivek Squires or means to access home:        IM Medicare Letter:n/a  Is patient a [place] and connected with the [address] [facility]? If yes, was [employer] transfer form completed and [facility] notified? Caregiver Contact:    Juliana Uribe friend [phone]  Jamal Feliciano mother [phone]  Discharge Caregiver contacted prior to discharge? Per patient  Care Conference needed?:      no      CM met with patient - confirmed her previous address with her mother is [facility] [address]- patients cell is [phone] - patient's mother was being discharged from [facility] when pamella came to the hospital - her brother from [facility] has become involved with mother and patient is not sure if mother is still in the home - they were in the process of having to leave the home due to inabilty to pay rent - patient is  and states he  lives in [place] and she is not in contact with him - patient and  are legally  for 10 years - patient is not close with her siblings and receives support from [employer] her friend who lives locally in the [place] area - patient uses no DME and states she is independent with ADL's at baseline- is interested in Alcohol Abuse resources- CM to provide resources for housing/food/SNAP/alcohol treatment programs including [facility] and [employer] info. C Patient to review resources and encouraged her to consider psyc/MD recommendations for discharge. Patient expressed her plan is to go to [facility] home upon discharge.   federico will follow psyc/attending plans for disposition plans at this time.  ABIGAIL Acosta normal...

## 2023-02-09 NOTE — PROGRESS NOTES
Hospitalist Progress Note    NAME: Felicita King   :  1965   MRN:  [id number]       Assessment / Plan:  Acute hypoxic respiratory failure, POA  COPD exacerbation, POA  Multiple rib fractures, POA  - EKG on admission with no acute ischemic changes  - proBNP around 1600  - On oxygen 2 L via nasal cannula  - CTA with no PE. Acute right ninth rib fracture, multiple bilateral remote rib fractures  - Spirometry as tolerated  - Add DuoNeb, arformoterol/budesonide, prednisone  - Wean off oxygen as tolerated  -PT OT eval and possible    Acute on chronic hyponatremia, POA  - Presented with sodium 118, baseline around 126  - Sodium improved to 129  - Nephrology input is appreciated  - Urine and serum studies compatible with possibly increase water intake  - Follow BMP very closely  - Etiology can be related for alcohol abuse and possibly excess water intake although patient is denying    Suicidal ideation  - Patient reported some suicide ideation to RN and to me  - Suicide precautions, constant observation  - Psychiatrist to evaluate the patient    Alcohol abuse  - Patient reported that she drinks on daily basis  - Watch for alcohol withdrawal syndrome    Nicotine use  - Counseling was provided about smoking cessation    Incidental finding on CTA chest    Compression of multiple vertebral bodies. Partial atelectasis/scarring in the right middle lobe. Partially opacified bronchioles supplying the posterior aspect of the left  lower lobe suggesting mucus plugging and/or bronchitis. Will need close follow-up as an outpatient    18.5 - 24.9 Normal weight / Body mass index is 21.08 kg/m². Estimated discharge date: February 10  Barriers: Clinical improvement, psych eval    Code status: Full  Prophylaxis: Lovenox  Recommended Disposition:  TBD     Subjective:     Patient was seen and examined. No acute events overnight. Discussed with RN overnight events. All patient's questions were answered.     \"I did not sleep well last night\"    Review of Systems:  Symptom Y/N Comments  Symptom Y/N Comments   Fever/Chills n   Chest Pain y Pleuritic   Poor Appetite    Edema     Cough n   Abdominal Pain n    Sputum    Joint Pain     SOB/JARAMILLO n   Pruritis/Rash     Nausea/vomit n   Tolerating PT/OT     Diarrhea    Tolerating Diet y    Constipation    Other       Could NOT obtain due to:          Objective:     VITALS:   Last 24hrs VS reviewed since prior progress note. Most recent are:  Patient Vitals for the past 24 hrs:   Temp Pulse Resp BP SpO2   02/09/23 1102 98.1 °F (36.7 °C) 98 16 118/69 97 %   02/09/23 0725 98.5 °F (36.9 °C) (!) 102 18 (!) 145/94 97 %   02/09/23 0452 -- -- -- -- 95 %   02/09/23 0440 98.2 °F (36.8 °C) (!) 114 18 130/70 (!) 89 %   02/09/23 0405 -- -- -- -- 97 %   02/08/23 2341 98.1 °F (36.7 °C) (!) 113 18 116/78 95 %   02/08/23 2040 -- -- -- -- 97 %   02/08/23 1923 98.4 °F (36.9 °C) (!) 102 18 111/65 97 %   02/08/23 1503 98.2 °F (36.8 °C) (!) 118 18 (!) 151/96 94 %       No intake or output data in the 24 hours ending 02/09/23 1326       I had a face to face encounter and independently examined this patient on 2/9/2023, as outlined below:  PHYSICAL EXAM:  General: WD, WN. Alert, cooperative, no acute distress    EENT:  EOMI. Anicteric sclerae. MMM  Resp:  CTA bilaterally, no wheezing or rales. No accessory muscle use  CV:  Regular  rhythm,  No edema  GI:  Soft, Non distended, Non tender. +Bowel sounds  Neurologic:  EOMs intact. No facial asymmetry. No aphasia or slurred speech. Symmetrical strength, Sensation grossly intact. Alert and oriented X 4.     Psych:   Good insight. Not anxious nor agitated  Skin:  No rashes.   No jaundice    Reviewed most current lab test results and cultures  YES  Reviewed most current radiology test results   YES  Review and summation of old records today    NO  Reviewed patient's current orders and MAR    YES  PMH/SH reviewed - no change compared to H&P  ________________________________________________________________________  Care Plan discussed with:    Comments   Patient X    Family      RN X    Care Manager     Consultant                        Multidiciplinary team rounds were held today with , nursing, pharmacist and clinical coordinator. Patient's plan of care was discussed; medications were reviewed and discharge planning was addressed. ________________________________________________________________________        Comments   >50% of visit spent in counseling and coordination of care X    ________________________________________________________________________  Nori Del Valle MD     Procedures: see electronic medical records for all procedures/Xrays and details which were not copied into this note but were reviewed prior to creation of Plan. LABS:  I reviewed today's most current labs and imaging studies. Pertinent labs include:  Recent Labs     02/08/23  0258 02/06/23 2209   WBC 4.3 3.4*   HGB 12.9 13.8   HCT 37.9 39.7   * 147*       Recent Labs     02/09/23  1210 02/09/23  0433 02/08/23  0258 02/07/23  0748 02/07/23  0512 02/06/23  2209   * 120* 122*   < > 121* 118*   K 3.8 5.4* 3.8   < > 4.2 3.5   CL 81* 80* 84*   < > 87* 80*   CO2 40* 37* 30   < > 28 33*   * 91 75   < > 164* 86   BUN 9 9 6   < > 5* 5*   CREA 0.37* 0.43* 0.42*   < > 0.53* 0.42*   CA 8.4* 8.6 8.6   < > 7.9* 8.3*   MG  --  1.6  --   --  1.7  1.7  --    PHOS  --  3.4  --   --  3.2  3.1  --    ALB  --  2.6*  --   --   --  3.0*   TBILI  --  0.9  --   --   --  0.8   ALT  --  24  --   --   --  28    < > = values in this interval not displayed.          Signed: Nori Del Valle MD

## 2023-02-09 NOTE — PROGRESS NOTES
02/08/23 2341   Vitals   Temp 98.1 °F (36.7 °C)   Temp Source Oral   Pulse (Heart Rate) (!) 113   Heart Rate Source Monitor   Resp Rate 18   O2 Sat (%) 95 %   Level of Consciousness 0   /78   MAP (Monitor) 87   MAP (Calculated) 91   BP 1 Location Left upper arm   BP 1 Method Automatic   BP Patient Position At rest   Cardiac Rhythm Sinus Tachy   MEWS Score 3   Pain 1   Pain Scale 1 Numeric (0 - 10)   Pain Intensity 1 0   Patient Stated Pain Goal 0   Pain Reassessment 1 Yes   POSS Scale   Opioid Sedation Scale 0   Oxygen Therapy   Pulse via Oximetry 112 beats per minute   O2 Device Nasal cannula   O2 Flow Rate (L/min) 2 l/min   MEWS 3 r/t HR, MD is aware, pt resting in no acute distress, will continue to monitor

## 2023-02-10 LAB
ALBUMIN SERPL-MCNC: 2.7 G/DL (ref 3.5–5)
ALBUMIN/GLOB SERPL: 0.8 (ref 1.1–2.2)
ALP SERPL-CCNC: 83 U/L (ref 45–117)
ALT SERPL-CCNC: 19 U/L (ref 12–78)
ANION GAP SERPL CALC-SCNC: 4 MMOL/L (ref 5–15)
AST SERPL-CCNC: 18 U/L (ref 15–37)
BILIRUB SERPL-MCNC: 0.6 MG/DL (ref 0.2–1)
BUN SERPL-MCNC: 7 MG/DL (ref 6–20)
BUN/CREAT SERPL: 22 (ref 12–20)
CALCIUM SERPL-MCNC: 8.4 MG/DL (ref 8.5–10.1)
CHLORIDE SERPL-SCNC: 83 MMOL/L (ref 97–108)
CO2 SERPL-SCNC: 36 MMOL/L (ref 21–32)
CREAT SERPL-MCNC: 0.32 MG/DL (ref 0.55–1.02)
GLOBULIN SER CALC-MCNC: 3.4 G/DL (ref 2–4)
GLUCOSE SERPL-MCNC: 99 MG/DL (ref 65–100)
MAGNESIUM SERPL-MCNC: 1.6 MG/DL (ref 1.6–2.4)
PHOSPHATE SERPL-MCNC: 2 MG/DL (ref 2.6–4.7)
POTASSIUM SERPL-SCNC: 3.7 MMOL/L (ref 3.5–5.1)
PROT SERPL-MCNC: 6.1 G/DL (ref 6.4–8.2)
SODIUM SERPL-SCNC: 123 MMOL/L (ref 136–145)
TSH SERPL DL<=0.05 MIU/L-ACNC: 1.33 UIU/ML (ref 0.36–3.74)

## 2023-02-10 PROCEDURE — 74011250637 HC RX REV CODE- 250/637: Performed by: INTERNAL MEDICINE

## 2023-02-10 PROCEDURE — 74011250636 HC RX REV CODE- 250/636: Performed by: NURSE PRACTITIONER

## 2023-02-10 PROCEDURE — 84100 ASSAY OF PHOSPHORUS: CPT

## 2023-02-10 PROCEDURE — 77010033678 HC OXYGEN DAILY

## 2023-02-10 PROCEDURE — 74011000250 HC RX REV CODE- 250: Performed by: INTERNAL MEDICINE

## 2023-02-10 PROCEDURE — 74011636637 HC RX REV CODE- 636/637: Performed by: INTERNAL MEDICINE

## 2023-02-10 PROCEDURE — 83735 ASSAY OF MAGNESIUM: CPT

## 2023-02-10 PROCEDURE — 74011250637 HC RX REV CODE- 250/637: Performed by: NURSE PRACTITIONER

## 2023-02-10 PROCEDURE — 36415 COLL VENOUS BLD VENIPUNCTURE: CPT

## 2023-02-10 PROCEDURE — 80053 COMPREHEN METABOLIC PANEL: CPT

## 2023-02-10 PROCEDURE — 65270000046 HC RM TELEMETRY

## 2023-02-10 PROCEDURE — 94640 AIRWAY INHALATION TREATMENT: CPT

## 2023-02-10 PROCEDURE — 84443 ASSAY THYROID STIM HORMONE: CPT

## 2023-02-10 PROCEDURE — 74011250636 HC RX REV CODE- 250/636: Performed by: INTERNAL MEDICINE

## 2023-02-10 PROCEDURE — 97116 GAIT TRAINING THERAPY: CPT

## 2023-02-10 PROCEDURE — 97535 SELF CARE MNGMENT TRAINING: CPT

## 2023-02-10 PROCEDURE — 74011000250 HC RX REV CODE- 250: Performed by: NURSE PRACTITIONER

## 2023-02-10 RX ADMIN — PREDNISONE 40 MG: 20 TABLET ORAL at 08:36

## 2023-02-10 RX ADMIN — SODIUM PHOSPHATE, MONOBASIC, MONOHYDRATE AND SODIUM PHOSPHATE, DIBASIC, ANHYDROUS: 276; 142 INJECTION, SOLUTION INTRAVENOUS at 08:35

## 2023-02-10 RX ADMIN — SODIUM CHLORIDE 75 ML/HR: 9 INJECTION, SOLUTION INTRAVENOUS at 12:00

## 2023-02-10 RX ADMIN — ENOXAPARIN SODIUM 40 MG: 100 INJECTION SUBCUTANEOUS at 08:35

## 2023-02-10 RX ADMIN — IPRATROPIUM BROMIDE AND ALBUTEROL SULFATE 3 ML: 2.5; .5 SOLUTION RESPIRATORY (INHALATION) at 14:09

## 2023-02-10 RX ADMIN — THIAMINE HCL TAB 100 MG 100 MG: 100 TAB at 08:36

## 2023-02-10 RX ADMIN — SODIUM CHLORIDE, PRESERVATIVE FREE 10 ML: 5 INJECTION INTRAVENOUS at 22:00

## 2023-02-10 RX ADMIN — ACETAMINOPHEN 325MG 650 MG: 325 TABLET ORAL at 18:19

## 2023-02-10 RX ADMIN — IPRATROPIUM BROMIDE AND ALBUTEROL SULFATE 3 ML: 2.5; .5 SOLUTION RESPIRATORY (INHALATION) at 06:19

## 2023-02-10 RX ADMIN — IPRATROPIUM BROMIDE AND ALBUTEROL SULFATE 3 ML: 2.5; .5 SOLUTION RESPIRATORY (INHALATION) at 20:40

## 2023-02-10 RX ADMIN — THERA TABS 1 TABLET: TAB at 08:36

## 2023-02-10 RX ADMIN — ARFORMOTEROL TARTRATE: 15 SOLUTION RESPIRATORY (INHALATION) at 06:24

## 2023-02-10 RX ADMIN — SODIUM CHLORIDE, PRESERVATIVE FREE 10 ML: 5 INJECTION INTRAVENOUS at 17:36

## 2023-02-10 RX ADMIN — SALINE NASAL SPRAY 2 SPRAY: 1.5 SOLUTION NASAL at 19:40

## 2023-02-10 RX ADMIN — FOLIC ACID 1 MG: 1 TABLET ORAL at 08:35

## 2023-02-10 RX ADMIN — Medication 2 PACKET: at 08:35

## 2023-02-10 RX ADMIN — CASTOR OIL AND BALSAM, PERU: 788; 87 OINTMENT TOPICAL at 21:32

## 2023-02-10 RX ADMIN — SODIUM CHLORIDE, PRESERVATIVE FREE 10 ML: 5 INJECTION INTRAVENOUS at 05:31

## 2023-02-10 RX ADMIN — LORAZEPAM 1 MG: 2 INJECTION INTRAMUSCULAR at 21:59

## 2023-02-10 RX ADMIN — CASTOR OIL AND BALSAM, PERU: 788; 87 OINTMENT TOPICAL at 08:36

## 2023-02-10 NOTE — PROGRESS NOTES
Hospitalist Progress Note    NAME: Haley Kent   :  1965   MRN:  [id number]       Assessment / Plan:  Acute hypoxic respiratory failure, POA  COPD exacerbation, POA  Multiple rib fractures, POA  - EKG on admission with no acute ischemic changes  - proBNP around 1600  - On oxygen 2 L via nasal cannula  - CTA with no PE. Acute right ninth rib fracture, multiple bilateral remote rib fractures  - Spirometry as tolerated  - Add DuoNeb, arformoterol/budesonide, prednisone  - Wean off oxygen as tolerated, likely she will need oxygen on discharge  - Oxygen challenge before discharge  -PT OT eval and possible    Acute on chronic hyponatremia, POA  - Presented with sodium 118, baseline around 126  - sodium has been fluctuating, 123 today  - Nephrology input is appreciated  - Back on IV fluids  - Urine and serum studies compatible with possibly increase water intake  - Follow BMP very closely  - Etiology can be related for alcohol abuse and possibly excess water intake although patient is denying    Suicidal ideation  - Still suicidal this a.m.  - Suicide precautions, constant observation  - Psychiatrist evaluated patient and felt the diagnosis is adjustment disorder, recommendation to discharge home and follow-up as an outpatient    Alcohol abuse  - Patient reported that she drinks on daily basis  - Watch for alcohol withdrawal syndrome    Nicotine use  - Counseling was provided about smoking cessation    Incidental finding on CTA chest    Compression of multiple vertebral bodies. Partial atelectasis/scarring in the right middle lobe. Partially opacified bronchioles supplying the posterior aspect of the left  lower lobe suggesting mucus plugging and/or bronchitis. Will need close follow-up as an outpatient    18.5 - 24.9 Normal weight / Body mass index is 19.84 kg/m².     Estimated discharge date:   Barriers: PT OT, oxygen challenge, sodium improvement    Code status: Full  Prophylaxis: Lovenox  Recommended Disposition:  TBD     Subjective:     Patient was seen and examined. No acute events overnight. Discussed with RN overnight events. All patient's questions were answered. \"Doing very well today\"    Review of Systems:  Symptom Y/N Comments  Symptom Y/N Comments   Fever/Chills n   Chest Pain y Pleuritic   Poor Appetite    Edema     Cough n   Abdominal Pain n    Sputum    Joint Pain     SOB/JARAMILLO n   Pruritis/Rash     Nausea/vomit n   Tolerating PT/OT     Diarrhea    Tolerating Diet y    Constipation    Other       Could NOT obtain due to:          Objective:     VITALS:   Last 24hrs VS reviewed since prior progress note. Most recent are:  Patient Vitals for the past 24 hrs:   Temp Pulse Resp BP SpO2   02/10/23 0708 98.5 °F (36.9 °C) (!) 103 18 (!) 150/81 94 %   02/10/23 0648 -- -- -- -- (!) 89 %   02/10/23 0619 -- -- -- -- 92 %   02/10/23 0244 99 °F (37.2 °C) 99 18 138/76 97 %   02/09/23 2319 98.3 °F (36.8 °C) (!) 103 20 (!) 150/81 93 %   02/09/23 2158 -- -- -- -- 94 %   02/09/23 1908 98.5 °F (36.9 °C) (!) 103 18 (!) 157/87 96 %   02/09/23 1553 97 °F (36.1 °C) (!) 101 18 (!) 107/58 96 %       No intake or output data in the 24 hours ending 02/10/23 1113       I had a face to face encounter and independently examined this patient on 2/10/2023, as outlined below:  PHYSICAL EXAM:  General: WD, WN. Alert, cooperative, no acute distress    EENT:  EOMI. Anicteric sclerae. MMM  Resp:  CTA bilaterally, no wheezing or rales. No accessory muscle use  CV:  Regular  rhythm,  No edema  GI:  Soft, Non distended, Non tender. +Bowel sounds  Neurologic:  EOMs intact. No facial asymmetry. No aphasia or slurred speech. Symmetrical strength, Sensation grossly intact. Alert and oriented X 4.     Psych:   Good insight. Not anxious nor agitated  Skin:  No rashes.   No jaundice    Reviewed most current lab test results and cultures  YES  Reviewed most current radiology test results   YES  Review and summation of old records today    NO  Reviewed patient's current orders and MAR    YES  PMH/SH reviewed - no change compared to H&P  ________________________________________________________________________  Care Plan discussed with:    Comments   Patient X    Family      RN X    Care Manager     Consultant                        Multidiciplinary team rounds were held today with , nursing, pharmacist and clinical coordinator. Patient's plan of care was discussed; medications were reviewed and discharge planning was addressed. ________________________________________________________________________        Comments   >50% of visit spent in counseling and coordination of care X    ________________________________________________________________________  Can Deluca MD     Procedures: see electronic medical records for all procedures/Xrays and details which were not copied into this note but were reviewed prior to creation of Plan. LABS:  I reviewed today's most current labs and imaging studies.   Pertinent labs include:  Recent Labs     02/08/23  0258   WBC 4.3   HGB 12.9   HCT 37.9   *       Recent Labs     02/10/23  0530 02/09/23  1210 02/09/23  0433   * 124* 120*   K 3.7 3.8 5.4*   CL 83* 81* 80*   CO2 36* 40* 37*   GLU 99 106* 91   BUN 7 9 9   CREA 0.32* 0.37* 0.43*   CA 8.4* 8.4* 8.6   MG 1.6  --  1.6   PHOS 2.0*  --  3.4   ALB 2.7*  --  2.6*   TBILI 0.6  --  0.9   ALT 19  --  24         Signed: Can Deluca MD

## 2023-02-10 NOTE — PROGRESS NOTES
Problem: Pressure Injury - Risk of  Goal: *Prevention of pressure injury  Description: Document Kelvin Scale and appropriate interventions in the flowsheet. Outcome: Progressing Towards Goal  Note: Pressure Injury Interventions:  Sensory Interventions: Assess changes in LOC, Avoid rigorous massage over bony prominences, Assess need for specialty bed, Keep linens dry and wrinkle-free, Float heels, Maintain/enhance activity level, Minimize linen layers, Monitor skin under medical devices, Turn and reposition approx. every two hours (pillows and wedges if needed)    Moisture Interventions: Absorbent underpads, Apply protective barrier, creams and emollients, Assess need for specialty bed, Internal/External urinary devices, Limit adult briefs    Activity Interventions: Assess need for specialty bed, Increase time out of bed, Pressure redistribution bed/mattress(bed type)    Mobility Interventions: Assess need for specialty bed, Float heels, HOB 30 degrees or less    Nutrition Interventions: Document food/fluid/supplement intake, Discuss nutritional consult with provider    Friction and Shear Interventions: Apply protective barrier, creams and emollients, Foam dressings/transparent film/skin sealants, HOB 30 degrees or less, Lift sheet, Minimize layers                Problem: Patient Education: Go to Patient Education Activity  Goal: Patient/Family Education  Outcome: Progressing Towards Goal     Problem: Falls - Risk of  Goal: *Absence of Falls  Description: Document Caitlin Fall Risk and appropriate interventions in the flowsheet.   Outcome: Progressing Towards Goal  Note: Fall Risk Interventions:                                Problem: Patient Education: Go to Patient Education Activity  Goal: Patient/Family Education  Outcome: Progressing Towards Goal     Problem: Risk for Elopement  Goal: Patient will not exit the unit/facility without proper escort  Outcome: Progressing Towards Goal     Problem: Breathing Pattern - Ineffective  Goal: *Absence of hypoxia  Outcome: Progressing Towards Goal  Goal: *Use of effective breathing techniques  Outcome: Progressing Towards Goal  Goal: *PALLIATIVE CARE:  Alleviation of Dyspnea  Outcome: Progressing Towards Goal     Problem: Patient Education: Go to Patient Education Activity  Goal: Patient/Family Education  Outcome: Progressing Towards Goal     Problem: Suicide  Goal: *STG: Remains safe in hospital  Outcome: Progressing Towards Goal  Goal: *STG: Seeks staff when feelings of self harm or harm towards others arise  Outcome: Progressing Towards Goal  Goal: *STG: Attends activities and groups  Outcome: Progressing Towards Goal  Goal: *STG:  Verbalizes alternative ways of dealing with maladaptive feelings/behaviors  Outcome: Progressing Towards Goal  Goal: *STG/LTG: Complies with medication therapy  Outcome: Progressing Towards Goal  Goal: *STG/LTG: No longer expresses self destructive or suicidal thoughts  Outcome: Progressing Towards Goal  Goal: *LTG:  Identifies available community resources  Outcome: Progressing Towards Goal  Goal: *LTG:  Develops proactive suicide prevention plan  Outcome: Progressing Towards Goal  Goal: Interventions  Outcome: Progressing Towards Goal     Problem: Patient Education: Go to Patient Education Activity  Goal: Patient/Family Education  Outcome: Progressing Towards Goal     Problem: Patient Education: Go to Patient Education Activity  Goal: Patient/Family Education  Outcome: Progressing Towards Goal     Problem: Patient Education: Go to Patient Education Activity  Goal: Patient/Family Education  Outcome: Progressing Towards Goal

## 2023-02-10 NOTE — PROGRESS NOTES
[facility]  Progress Note        NAME: Ulises Kearney       :  1965       MRN:  [id number]     Date/Time: 2/10/2023    Risk of deterioration: medium       Assessment:    Plan:  Acute on chronic hyponatremia  ETOH  HTN  Psych  Mucous plugging/sob Sodium marla 118  Sodium 123 today  Avoid free water/hypotonic fluids  IN naphos infusion-as phos low  Continue ns  [place] [facility] by nephrology there in past-working diagnosis etoh/increased water intake  No need for hot salt at this time  Psych to see for suicidal ideations  Repeat labs later today    CTA (-) PE         Subjective:     Chief Complaint:  I'm thirsty    Review of Systems: no n/v/cp/f/c  (+) cough/sob  (-) dysuria    Objective:     VITALS:   Last 24hrs VS reviewed since prior progress note. Most recent are:  Visit Vitals  /76 (BP 1 Location: Left upper arm, BP Patient Position: At rest)   Pulse 99   Temp 99 °F (37.2 °C)   Resp 18   Ht 5' 3\" (1.6 m)   Wt 50.8 kg (111 lb 15.9 oz)   SpO2 92%   BMI 19.84 kg/m²     SpO2 Readings from Last 6 Encounters:   02/10/23 92%    O2 Flow Rate (L/min): 2 l/min   No intake or output data in the 24 hours ending 02/10/23 0644       Telemetry Reviewed       PHYSICAL EXAM:    General   well developed, chronically ill appearing wf  EENT  Normocephalic, Atraumatic,  EOMI, sclera clear,  Respiratory   Clear To Auscultation bilaterally  Cardiology  Regular Rate and Rythmn    Abdominal  Soft, non-tender, non-distended, positive bowel sounds  Extremities  No clubbing, cyanosis, or edema. Pulses intact.               Lab Data Reviewed: (see below)    Medications Reviewed: (see below)    PMH/SH reviewed - no change compared to H&P________________    Attending Physician: Hudson Mortimer, MD     ____________________________________________________  MEDICATIONS:  Current Facility-Administered Medications   Medication Dose Route Frequency    sodium phosphate 30 mmol in 0.9% sodium chloride 250 mL infusion   IntraVENous ONCE    0.9% sodium chloride infusion  75 mL/hr IntraVENous CONTINUOUS    sodium chloride soluble tablet 1,000 mg  1 g Oral BID    albuterol-ipratropium (DUO-NEB) 2.5 MG-0.5 MG/3 ML  3 mL Nebulization Q4H PRN    arformoterol 15 mcg/budesonide 0.5 mg neb solution   Nebulization BID RT    predniSONE (DELTASONE) tablet 40 mg  40 mg Oral DAILY WITH BREAKFAST    therapeutic multivitamin (THERAGRAN) tablet 1 Tablet  1 Tablet Oral DAILY    folic acid (FOLVITE) tablet 1 mg  1 mg Oral DAILY    thiamine mononitrate (B-1) tablet 100 mg  100 mg Oral DAILY    sodium chloride (NS) flush 5-40 mL  5-40 mL IntraVENous Q8H    sodium chloride (NS) flush 5-40 mL  5-40 mL IntraVENous PRN    acetaminophen (TYLENOL) tablet 650 mg  650 mg Oral Q6H PRN    Or    acetaminophen (TYLENOL) suppository 650 mg  650 mg Rectal Q6H PRN    polyethylene glycol (MIRALAX) packet 17 g  17 g Oral DAILY PRN    ondansetron (ZOFRAN ODT) tablet 4 mg  4 mg Oral Q8H PRN    Or    ondansetron (ZOFRAN) injection 4 mg  4 mg IntraVENous Q6H PRN    enoxaparin (LOVENOX) injection 40 mg  40 mg SubCUTAneous DAILY    ipratropium (ATROVENT) 0.02 % nebulizer solution 0.5 mg  0.5 mg Nebulization Q4H PRN    levalbuterol (XOPENEX) nebulizer soln 1.25 mg/3 mL  1.25 mg Nebulization Q4H PRN    LORazepam (ATIVAN) injection 1 mg  1 mg IntraVENous Q2H PRN    LORazepam (ATIVAN) injection 2 mg  2 mg IntraVENous Q2H PRN    nicotine (NICODERM CQ) 21 mg/24 hr patch 1 Patch  1 Patch TransDERmal DAILY    balsam peru-castor oiL (VENELEX) ointment   Topical BID        LABS:  Recent Labs     02/08/23  0258   WBC 4.3   HGB 12.9   HCT 37.9   *       Recent Labs     02/10/23  0530 02/09/23  1210 02/09/23  0433   * 124* 120*   K 3.7 3.8 5.4*   CL 83* 81* 80*   CO2 36* 40* 37*   BUN 7 9 9   CREA 0.32* 0.37* 0.43*   GLU 99 106* 91   CA 8.4* 8.4* 8.6   MG 1.6  --  1.6   PHOS 2.0*  --  3.4       Recent Labs     02/10/23  0530 02/09/23  0433   ALT 19 24   AP 83 87 TBILI 0.6 0.9   TP 6.1* 6.2*   ALB 2.7* 2.6*   GLOB 3.4 3.6       No results for input(s): INR, PTP, APTT, INREXT, INREXT in the last 72 hours. No results for input(s): FE, TIBC, PSAT, FERR in the last 72 hours. No results for input(s): PH, PCO2, PO2 in the last 72 hours. No results for input(s): CPK, CKNDX, TROIQ in the last 72 hours.     No lab exists for component: CPKMB  No results found for: [facility]

## 2023-02-10 NOTE — PROGRESS NOTES
End of Shift Note    Bedside shift change report given to JORGE Martinez (oncoming nurse) by Scarlet Baker RN (offgoing nurse).   Report included the following information SBAR, Kardex, ED Summary, Intake/Output, MAR, and Recent Results    Shift worked:  Night     Shift summary and any significant changes:     Pt remains on 2L NC, 1:1 sitter remains at bedside     Pending psych recommendations from consult yesterday     Concerns for physician to address:  none     Zone phone for oncoming shift:   xxx         Scarlet Baker RN

## 2023-02-10 NOTE — PROGRESS NOTES
1200 PM Received TORB from Dr. Minerva Riley for psych consult again today, 2/10. Primary RN, Allan Mohr, updated.

## 2023-02-10 NOTE — PROGRESS NOTES
Problem: Pressure Injury - Risk of  Goal: *Prevention of pressure injury  Description: Document Kelvin Scale and appropriate interventions in the flowsheet. Outcome: Progressing Towards Goal  Note: Pressure Injury Interventions:  Sensory Interventions: Assess changes in LOC, Assess need for specialty bed, Avoid rigorous massage over bony prominences, Float heels, Keep linens dry and wrinkle-free, Maintain/enhance activity level, Minimize linen layers, Monitor skin under medical devices, Turn and reposition approx. every two hours (pillows and wedges if needed)    Moisture Interventions: Absorbent underpads, Apply protective barrier, creams and emollients, Assess need for specialty bed, Internal/External urinary devices, Limit adult briefs, Maintain skin hydration (lotion/cream), Minimize layers, Moisture barrier, Offer toileting Q_hr, Check for incontinence Q2 hours and as needed    Activity Interventions: Assess need for specialty bed, Pressure redistribution bed/mattress(bed type), Increase time out of bed    Mobility Interventions: Assess need for specialty bed, Float heels, HOB 30 degrees or less    Nutrition Interventions: Document food/fluid/supplement intake, Discuss nutritional consult with provider    Friction and Shear Interventions: Apply protective barrier, creams and emollients, Foam dressings/transparent film/skin sealants, HOB 30 degrees or less, Lift sheet, Minimize layers                Problem: Falls - Risk of  Goal: *Absence of Falls  Description: Document Caitlin Fall Risk and appropriate interventions in the flowsheet.   Outcome: Progressing Towards Goal  Note: Fall Risk Interventions:                                Problem: Risk for Elopement  Goal: Patient will not exit the unit/facility without proper escort  Outcome: Progressing Towards Goal     Problem: Breathing Pattern - Ineffective  Goal: *Absence of hypoxia  Outcome: Progressing Towards Goal  Goal: *Use of effective breathing techniques  Outcome: Progressing Towards Goal  Goal: *PALLIATIVE CARE:  Alleviation of Dyspnea  Outcome: Progressing Towards Goal     Problem: Suicide  Goal: *STG: Remains safe in hospital  Outcome: Progressing Towards Goal  Goal: *STG: Seeks staff when feelings of self harm or harm towards others arise  Outcome: Progressing Towards Goal  Goal: *STG: Attends activities and groups  Outcome: Progressing Towards Goal  Goal: *STG:  Verbalizes alternative ways of dealing with maladaptive feelings/behaviors  Outcome: Progressing Towards Goal  Goal: *STG/LTG: Complies with medication therapy  Outcome: Progressing Towards Goal  Goal: *STG/LTG: No longer expresses self destructive or suicidal thoughts  Outcome: Progressing Towards Goal  Goal: *LTG:  Identifies available community resources  Outcome: Progressing Towards Goal  Goal: *LTG:  Develops proactive suicide prevention plan  Outcome: Progressing Towards Goal  Goal: Interventions  Outcome: Progressing Towards Goal

## 2023-02-10 NOTE — PROGRESS NOTES
End of Shift Note    Bedside shift change report given to RN (oncoming nurse) by Mahendra Gallagher RN (offgoing nurse). Report included the following information SBAR, Kardex, ED Summary, Procedure Summary, Intake/Output, MAR, Recent Results, Med Rec Status, and Cardiac Rhythm Sinus Rhythm    Shift worked:  7p-7a     Shift summary and any significant changes:    Consult called for psych, they did not consult on this shift. pt worked with Pt/OT. On 3L NC unable to wean.  Sitter continues at bedside     Concerns for physician to address:       Zone phone for oncoming shift:

## 2023-02-10 NOTE — PROGRESS NOTES
Problem: Mobility Impaired (Adult and Pediatric)  Goal: *Acute Goals and Plan of Care (Insert Text)  Description: FUNCTIONAL STATUS PRIOR TO ADMISSION: Patient was independent and active without use of DME.    HOME SUPPORT PRIOR TO ADMISSION: Per chart patient lived with mother. Possibly caregiver for mother    Physical Therapy Goals  Initiated 2/8/2023  1. Patient will move from supine to sit and sit to supine  in bed with supervision within 7 day(s). 2.  Patient will transfer from bed to chair and chair to bed with CGA using the least restrictive device within 7 day(s). 3.  Patient will perform sit to stand with CGA within 7 day(s). 4.  Patient will ambulate with CGA for 250 feet with the least restrictive device within 7 day(s). Outcome: Progressing Towards Goal     PHYSICAL THERAPY TREATMENT  Patient: Siena Marcano (58 y.o. female)  Date: 2/10/2023  Diagnosis: Acute respiratory distress [R06.03]  Hyponatremia [E87.1] <principal problem not specified>      Precautions: Fall, Bed Alarm  Chart, physical therapy assessment, plan of care and goals were reviewed. ASSESSMENT  Patient continues with skilled PT services and is progressing towards goals. Patient was semi-supine in bed, reported she had already worked with therapy today, but still agreeable to get out of bed with PT. She requested to go to the bathroom- able to ambulate to/from bed and bathroom with no AD and contact guard assist. She then ambulated an additional 36' with no AD and contact guard assist and demonstrated improvement with managing O2 cord on her own. Remained on 3L O2, sats decreased to 92% each time, recovered to above 95% within 30 seconds of seated rest break. HR increased to 115 bpm with activity. Based on patient's improvement she may no longer require rehab placement, possibly home health therapy pending patient's continued progress. Patient left supine in bed, call bell within reach, sitter  present.      Current Level of Function Impacting Discharge (mobility/balance): overall contact guard for mobility    Other factors to consider for discharge: possibly going to live with her friend at discharge         PLAN :  Patient continues to benefit from skilled intervention to address the above impairments. Continue treatment per established plan of care. to address goals. Recommendation for discharge: (in order for the patient to meet his/her long term goals)  To be determined: possibly home health therapy depending on her progress    This discharge recommendation:  Has not yet been discussed the attending provider and/or case management    IF patient discharges home will need the following DME: none       SUBJECTIVE:   Patient stated I need to pee.     OBJECTIVE DATA SUMMARY:   Critical Behavior:  Neurologic State: Alert  Orientation Level: Oriented to person, Oriented to place, Oriented to time, Disoriented to situation  Cognition: Follows commands, Impaired decision making, Poor safety awareness  Safety/Judgement: Awareness of environment, Decreased insight into deficits  Functional Mobility Training:  Bed Mobility:  Rolling: Stand-by assistance  Supine to Sit: Stand-by assistance  Sit to Supine: Stand-by assistance  Scooting: Stand-by assistance    Transfers:  Sit to Stand: Contact guard assistance  Stand to Sit: Contact guard assistance      Balance:  Sitting: Intact  Standing: Impaired; Without support  Standing - Static: Good  Standing - Dynamic : Good;Fair  Ambulation/Gait Training:  Distance (ft): 60 Feet (ft) (10,10, 40)  Ambulation - Level of Assistance: Contact guard assistance  Speed/Maria Luisa: Pace decreased (<100 feet/min)  Step Length: Left shortened;Right shortened    Therapeutic Exercises:   none  Pain Rating:  none    Activity Tolerance:   Fair    After treatment patient left in no apparent distress:   Supine in bed, Call bell within reach, and Caregiver / family present    COMMUNICATION/COLLABORATION:   The patients plan of care was discussed with: Registered nurse.      Rolf Michelle, PT   Time Calculation: 12 mins

## 2023-02-10 NOTE — PROGRESS NOTES
Problem: Self Care Deficits Care Plan (Adult)  Goal: *Acute Goals and Plan of Care (Insert Text)  Description: FUNCTIONAL STATUS PRIOR TO ADMISSION: Patient questionable historian secondary to mild confusion but reported being independent in ADLs and functional mobility. HOME SUPPORT: The patient lived with mother. Per chart review, she is her mother's caregiver. Occupational Therapy Goals  Initiated 2/8/2023  1. Patient will perform grooming standing at sink with modified independence within 7 day(s). 2.  Patient will perform upper body dressing with modified independence within 7 day(s). 3.  Patient will perform lower body dressing with modified independence within 7 day(s). 4.  Patient will perform toilet transfers with modified independence within 7 day(s). 5.  Patient will perform all aspects of toileting with modified independence within 7 day(s). Outcome: Progressing Towards Goal   OCCUPATIONAL THERAPY TREATMENT  Patient: Grace Dhillon (58 y.o. female)  Date: 2/10/2023  Diagnosis: Acute respiratory distress [R06.03]  Hyponatremia [E87.1] <principal problem not specified>      Precautions: Fall, Bed Alarm  Chart, occupational therapy assessment, plan of care, and goals were reviewed. ASSESSMENT  Patient continues with skilled OT services and is progressing towards goals. Patient received semisupine in bed on 4L O2 with sitter present in room and cleared for therapy by nursing. She appeared more alert and oriented this session. Patient completed supine > sit with stand-by assist and demonstrated intact sitting balance while EOB. She appeared SOB with sats 95% and educated on energy conservation. Patient requested to use the bathroom and refused using rolling walker despite encouragement. She completed sit > stand with contact guard assist and ambulated to toilet.  Patient completed toileting/hygiene with stand-by assist and stood to wash hands with stand-by assist. Cues provided for safety awareness while managing O2 line. Patient declined sitting in recliner chair despite encouragement and returned to EOB. While seated, she demonstrated LB dressing using tailor sitting technique with supervision. Patient was left semisupine in bed with all needs met, VSS, and sitter present in room. Patient would continue to benefit from skilled OT services during acute hospital stay. Anticipate patient can return home with assist from friend who is willing to stay with her and [address] [place] [address]. Current Level of Function Impacting Discharge (ADLs): supervision to stand-by assist for ADLs, stand-by to contact guard assist for functional mobility     Other factors to consider for discharge: fall risk, supplemental O2 requirement         PLAN :  Patient continues to benefit from skilled intervention to address the above impairments. Continue treatment per established plan of care to address goals. Recommendation for discharge: (in order for the patient to meet his/her long term goals)  Home with [address] [place] [address] with assist from friend as needed, pending progress    This discharge recommendation:  Has not yet been discussed the attending provider and/or case management    IF patient discharges home will need the following DME: TBD pending progress; refused using rolling walker this session       SUBJECTIVE:   Patient stated I'm going home to be with my mom and my friend will take care of me.     OBJECTIVE DATA SUMMARY:   Cognitive/Behavioral Status:  Neurologic State: Alert  Orientation Level: Oriented to person;Oriented to place;Oriented to time;Disoriented to situation  Cognition: Follows commands; Impaired decision making;Poor safety awareness  Perception: Appears intact  Perseveration: No perseveration noted  Safety/Judgement: Awareness of environment;Decreased insight into deficits    Functional Mobility and Transfers for ADLs:  Bed Mobility:  Rolling: Stand-by assistance  Supine to Sit: Stand-by assistance  Sit to Supine: Stand-by assistance  Scooting: Stand-by assistance    Transfers:  Sit to Stand: Contact guard assistance  Stand to Sit: Contact guard assistance  Functional Transfers  Bathroom Mobility: Contact guard assistance  Toilet Transfer : Stand-by assistance  Cues: Tactile cues provided;Verbal cues provided    Balance:  Sitting: Intact  Standing: Impaired; Without support  Standing - Static: Good  Standing - Dynamic : Good;Fair    ADL Intervention:    Grooming  Position Performed: Standing (at sink)  Washing Hands: Stand-by assistance  Cues: Verbal cues provided    Lower Body Dressing Assistance  Socks: Supervision  Leg Crossed Method Used: Yes  Position Performed: Seated edge of bed  Cues: Doff;Don;Verbal cues provided    Toileting  Bladder Hygiene: Stand-by assistance  Clothing Management: Stand-by assistance  Cues: Verbal cues provided    Cognitive Retraining  Safety/Judgement: Awareness of environment;Decreased insight into deficits    Pain:  Patient did not c/o pain during session. Activity Tolerance:   Fair, desaturates with exertion and requires oxygen, and requires rest breaks    After treatment patient left in no apparent distress:   Supine in bed, Call bell within reach, Side rails x 3, and PCT present in room    COMMUNICATION/COLLABORATION:   The patients plan of care was discussed with: Physical therapist and Registered nurse.      Hilaria Barraza OTR/L  Time Calculation: 24 mins

## 2023-02-11 ENCOUNTER — APPOINTMENT (OUTPATIENT)
Dept: GENERAL RADIOLOGY | Age: 58
DRG: 190 | End: 2023-02-11
Attending: INTERNAL MEDICINE

## 2023-02-11 LAB
ANION GAP SERPL CALC-SCNC: 7 MMOL/L (ref 5–15)
ANION GAP SERPL CALC-SCNC: 8 MMOL/L (ref 5–15)
BNP SERPL-MCNC: 2379 PG/ML
BUN SERPL-MCNC: 12 MG/DL (ref 6–20)
BUN SERPL-MCNC: 42 MG/DL (ref 6–20)
BUN/CREAT SERPL: 32 (ref 12–20)
BUN/CREAT SERPL: 91 (ref 12–20)
CALCIUM SERPL-MCNC: 8 MG/DL (ref 8.5–10.1)
CALCIUM SERPL-MCNC: 8.3 MG/DL (ref 8.5–10.1)
CHLORIDE SERPL-SCNC: 78 MMOL/L (ref 97–108)
CHLORIDE SERPL-SCNC: 81 MMOL/L (ref 97–108)
CHLORIDE UR-SCNC: 51 MMOL/L
CO2 SERPL-SCNC: 32 MMOL/L (ref 21–32)
CO2 SERPL-SCNC: 35 MMOL/L (ref 21–32)
CREAT SERPL-MCNC: 0.38 MG/DL (ref 0.55–1.02)
CREAT SERPL-MCNC: 0.46 MG/DL (ref 0.55–1.02)
GLUCOSE SERPL-MCNC: 121 MG/DL (ref 65–100)
GLUCOSE SERPL-MCNC: 131 MG/DL (ref 65–100)
PHOSPHATE SERPL-MCNC: 2.4 MG/DL (ref 2.6–4.7)
PHOSPHATE SERPL-MCNC: 4.2 MG/DL (ref 2.6–4.7)
POTASSIUM SERPL-SCNC: 2.9 MMOL/L (ref 3.5–5.1)
POTASSIUM SERPL-SCNC: 4.3 MMOL/L (ref 3.5–5.1)
POTASSIUM UR-SCNC: 30 MMOL/L
SODIUM SERPL-SCNC: 120 MMOL/L (ref 136–145)
SODIUM SERPL-SCNC: 121 MMOL/L (ref 136–145)
SODIUM UR-SCNC: 70 MMOL/L

## 2023-02-11 PROCEDURE — 83880 ASSAY OF NATRIURETIC PEPTIDE: CPT

## 2023-02-11 PROCEDURE — 84100 ASSAY OF PHOSPHORUS: CPT

## 2023-02-11 PROCEDURE — 84300 ASSAY OF URINE SODIUM: CPT

## 2023-02-11 PROCEDURE — 71045 X-RAY EXAM CHEST 1 VIEW: CPT

## 2023-02-11 PROCEDURE — 74011250636 HC RX REV CODE- 250/636: Performed by: NURSE PRACTITIONER

## 2023-02-11 PROCEDURE — 74011250636 HC RX REV CODE- 250/636: Performed by: INTERNAL MEDICINE

## 2023-02-11 PROCEDURE — 74011636637 HC RX REV CODE- 636/637: Performed by: INTERNAL MEDICINE

## 2023-02-11 PROCEDURE — 84133 ASSAY OF URINE POTASSIUM: CPT

## 2023-02-11 PROCEDURE — 74011000250 HC RX REV CODE- 250: Performed by: INTERNAL MEDICINE

## 2023-02-11 PROCEDURE — 80048 BASIC METABOLIC PNL TOTAL CA: CPT

## 2023-02-11 PROCEDURE — 94640 AIRWAY INHALATION TREATMENT: CPT

## 2023-02-11 PROCEDURE — 36415 COLL VENOUS BLD VENIPUNCTURE: CPT

## 2023-02-11 PROCEDURE — 74011250637 HC RX REV CODE- 250/637: Performed by: INTERNAL MEDICINE

## 2023-02-11 PROCEDURE — 82436 ASSAY OF URINE CHLORIDE: CPT

## 2023-02-11 PROCEDURE — 74011250637 HC RX REV CODE- 250/637: Performed by: NURSE PRACTITIONER

## 2023-02-11 PROCEDURE — 74011000250 HC RX REV CODE- 250: Performed by: NURSE PRACTITIONER

## 2023-02-11 PROCEDURE — 65270000046 HC RM TELEMETRY

## 2023-02-11 RX ORDER — POTASSIUM CHLORIDE 750 MG/1
40 TABLET, FILM COATED, EXTENDED RELEASE ORAL EVERY 6 HOURS
Status: DISCONTINUED | OUTPATIENT
Start: 2023-02-11 | End: 2023-02-11

## 2023-02-11 RX ORDER — ENOXAPARIN SODIUM 100 MG/ML
30 INJECTION SUBCUTANEOUS DAILY
Status: DISCONTINUED | OUTPATIENT
Start: 2023-02-12 | End: 2023-02-21 | Stop reason: HOSPADM

## 2023-02-11 RX ORDER — FUROSEMIDE 10 MG/ML
40 INJECTION INTRAMUSCULAR; INTRAVENOUS ONCE
Status: COMPLETED | OUTPATIENT
Start: 2023-02-11 | End: 2023-02-11

## 2023-02-11 RX ORDER — SODIUM,POTASSIUM PHOSPHATES 280-250MG
2 POWDER IN PACKET (EA) ORAL 4 TIMES DAILY
Status: DISPENSED | OUTPATIENT
Start: 2023-02-11 | End: 2023-02-11

## 2023-02-11 RX ADMIN — POTASSIUM CHLORIDE 40 MEQ: 750 TABLET, FILM COATED, EXTENDED RELEASE ORAL at 03:56

## 2023-02-11 RX ADMIN — LORAZEPAM 1 MG: 2 INJECTION INTRAMUSCULAR at 03:47

## 2023-02-11 RX ADMIN — THERA TABS 1 TABLET: TAB at 09:56

## 2023-02-11 RX ADMIN — SODIUM CHLORIDE, PRESERVATIVE FREE 10 ML: 5 INJECTION INTRAVENOUS at 04:43

## 2023-02-11 RX ADMIN — POTASSIUM PHOSPHATE, MONOBASIC AND POTASSIUM PHOSPHATE, DIBASIC: 224; 236 INJECTION, SOLUTION, CONCENTRATE INTRAVENOUS at 04:40

## 2023-02-11 RX ADMIN — THIAMINE HCL TAB 100 MG 100 MG: 100 TAB at 09:56

## 2023-02-11 RX ADMIN — ENOXAPARIN SODIUM 40 MG: 100 INJECTION SUBCUTANEOUS at 09:57

## 2023-02-11 RX ADMIN — POTASSIUM & SODIUM PHOSPHATES POWDER PACK 280-160-250 MG 2 PACKET: 280-160-250 PACK at 09:55

## 2023-02-11 RX ADMIN — IPRATROPIUM BROMIDE AND ALBUTEROL SULFATE 3 ML: 2.5; .5 SOLUTION RESPIRATORY (INHALATION) at 08:59

## 2023-02-11 RX ADMIN — FUROSEMIDE 40 MG: 10 INJECTION, SOLUTION INTRAMUSCULAR; INTRAVENOUS at 03:57

## 2023-02-11 RX ADMIN — FOLIC ACID 1 MG: 1 TABLET ORAL at 09:55

## 2023-02-11 RX ADMIN — PREDNISONE 40 MG: 20 TABLET ORAL at 09:57

## 2023-02-11 RX ADMIN — POTASSIUM & SODIUM PHOSPHATES POWDER PACK 280-160-250 MG 2 PACKET: 280-160-250 PACK at 18:34

## 2023-02-11 RX ADMIN — SODIUM CHLORIDE, PRESERVATIVE FREE 10 ML: 5 INJECTION INTRAVENOUS at 13:21

## 2023-02-11 RX ADMIN — CASTOR OIL AND BALSAM, PERU: 788; 87 OINTMENT TOPICAL at 09:55

## 2023-02-11 RX ADMIN — POTASSIUM & SODIUM PHOSPHATES POWDER PACK 280-160-250 MG 2 PACKET: 280-160-250 PACK at 13:20

## 2023-02-11 RX ADMIN — Medication 2 PACKET: at 09:55

## 2023-02-11 RX ADMIN — Medication 15 MG: at 18:34

## 2023-02-11 RX ADMIN — SODIUM CHLORIDE, PRESERVATIVE FREE 10 ML: 5 INJECTION INTRAVENOUS at 03:48

## 2023-02-11 RX ADMIN — SODIUM CHLORIDE 75 ML/HR: 9 INJECTION, SOLUTION INTRAVENOUS at 01:58

## 2023-02-11 RX ADMIN — LORAZEPAM 2 MG: 2 INJECTION INTRAMUSCULAR at 14:13

## 2023-02-11 NOTE — PROGRESS NOTES
Hospitalist Progress Note    NAME: Nimesh Rangel   :  1965   MRN:  [id number]       Assessment / Plan:  Acute hypoxic respiratory failure, POA  COPD exacerbation, POA  Multiple rib fractures, POA  - EKG on admission with no acute ischemic changes  - proBNP around 1600  - On oxygen 2 L via nasal cannula  - CTA with no PE. Acute right ninth rib fracture, multiple bilateral remote rib fractures  - Spirometry as tolerated  - Add DuoNeb, arformoterol/budesonide, prednisone  - Wean off oxygen as tolerated, likely she will need oxygen on discharge  - Oxygen challenge before discharge  -PT OT eval     Acute on chronic hyponatremia, POA  - Presented with sodium 118, baseline around 126  - sodium has been fluctuating, 120 today  - Nephrology input is appreciated  - FR. May need tolvaptan  - Follow BMP very closely  - Etiology can be related for alcohol abuse and possibly excess water intake although patient is denying    Suicidal ideation    - Suicide precautions, constant observation  - Psychiatrist evaluated patient and felt the diagnosis is adjustment disorder, recommendation to discharge home and follow-up as an outpatient    Alcohol abuse  - Patient reported that she drinks on daily basis  - Watch for alcohol withdrawal syndrome    Nicotine use  - Counseling was provided about smoking cessation    Incidental finding on CTA chest    Compression of multiple vertebral bodies. Partial atelectasis/scarring in the right middle lobe. Partially opacified bronchioles supplying the posterior aspect of the left  lower lobe suggesting mucus plugging and/or bronchitis. Will need close follow-up as an outpatient    18.5 - 24.9 Normal weight / Body mass index is 16.99 kg/m². Estimated discharge date:   Barriers: PT OT, oxygen challenge, sodium improvement    Code status: Full  Prophylaxis: Lovenox  Recommended Disposition:  TBD     Subjective:     Patient was seen and examined.   No acute events overnight. Discussed with RN overnight events. She is worried about caring for herself when she goes home. She says she is not walking right. Review of Systems:  Symptom Y/N Comments  Symptom Y/N Comments   Fever/Chills n   Chest Pain y Pleuritic   Poor Appetite    Edema     Cough n   Abdominal Pain n    Sputum    Joint Pain     SOB/JARAMILLO n   Pruritis/Rash     Nausea/vomit n   Tolerating PT/OT     Diarrhea    Tolerating Diet y    Constipation    Other       Could NOT obtain due to:          Objective:     VITALS:   Last 24hrs VS reviewed since prior progress note. Most recent are:  Patient Vitals for the past 24 hrs:   Temp Pulse Resp BP SpO2   02/11/23 1058 98.7 °F (37.1 °C) (!) 117 22 134/86 98 %   02/11/23 0930 -- (!) 117 -- -- 93 %   02/11/23 0900 -- -- -- -- 97 %   02/11/23 0707 97.4 °F (36.3 °C) (!) 104 20 (!) 153/99 99 %   02/11/23 0242 97.8 °F (36.6 °C) 93 20 (!) 152/90 95 %   02/10/23 2324 98.3 °F (36.8 °C) -- -- (!) 140/85 --   02/10/23 2313 -- 95 20 -- 97 %   02/10/23 2039 -- -- -- -- 92 %   02/10/23 1923 98.2 °F (36.8 °C) (!) 103 20 (!) 153/91 93 %         Intake/Output Summary (Last 24 hours) at 2/11/2023 1521  Last data filed at 2/11/2023 1211  Gross per 24 hour   Intake --   Output 3410 ml   Net -3410 ml          I had a face to face encounter and independently examined this patient on 2/11/2023, as outlined below:  PHYSICAL EXAM:  General: WD, WN. Alert, cooperative, no acute distress    EENT:  EOMI. Anicteric sclerae. MMM  Resp:  bilaterally, +ve wheezing, no rales. No accessory muscle use  CV:  Regular  rhythm,  No edema  GI:  Soft, Non distended, Non tender. +Bowel sounds  Neurologic:  EOMs intact. No facial asymmetry. No aphasia or slurred speech. Symmetrical strength, Sensation grossly intact. Alert and oriented X 4.     Psych:   Good insight. Not anxious nor agitated  Skin:  No rashes.   No jaundice    Reviewed most current lab test results and cultures  YES  Reviewed most current radiology test results   YES  Review and summation of old records today    NO  Reviewed patient's current orders and MAR    YES  PMH/SH reviewed - no change compared to H&P  ________________________________________________________________________  Care Plan discussed with:    Comments   Patient X    Family      RN X    Care Manager     Consultant                        Multidiciplinary team rounds were held today with , nursing, pharmacist and clinical coordinator. Patient's plan of care was discussed; medications were reviewed and discharge planning was addressed. ________________________________________________________________________        Comments   >50% of visit spent in counseling and coordination of care X    ________________________________________________________________________  Susannah Louise MD     Procedures: see electronic medical records for all procedures/Xrays and details which were not copied into this note but were reviewed prior to creation of Plan. LABS:  I reviewed today's most current labs and imaging studies. Pertinent labs include:  No results for input(s): WBC, HGB, HCT, PLT, HGBEXT, HCTEXT, PLTEXT, HGBEXT, HCTEXT, PLTEXT in the last 72 hours. Recent Labs     02/11/23  1418 02/11/23  0147 02/10/23  0530 02/09/23  1210 02/09/23  0433   * 121* 123*   < > 120*   K 4.3 2.9* 3.7   < > 5.4*   CL 78* 81* 83*   < > 80*   CO2 35* 32 36*   < > 37*   * 121* 99   < > 91   BUN 42* 12 7   < > 9   CREA 0.46* 0.38* 0.32*   < > 0.43*   CA 8.3* 8.0* 8.4*   < > 8.6   MG  --   --  1.6  --  1.6   PHOS 4.2 2.4* 2.0*  --  3.4   ALB  --   --  2.7*  --  2.6*   TBILI  --   --  0.6  --  0.9   ALT  --   --  19  --  24    < > = values in this interval not displayed.          Signed: Susannah Louise MD

## 2023-02-11 NOTE — PROGRESS NOTES
ADULT PROTOCOL: JET AEROSOL ASSESSMENT    Patient  Rosas Ying     61 y.o.   female     2/10/2023  8:49 PM    Breath Sounds Pre Procedure: Right Breath Sounds: Coarse                               Left Breath Sounds: Coarse    Breath Sounds Post Procedure: Right Breath Sounds: Coarse, Scattered wheezing                                 Left Breath Sounds: Coarse, Scattered wheezing    Breathing pattern: Pre procedure Breathing Pattern: Dyspnea at rest          Post procedure Breathing Pattern: Dyspnea at rest    Heart Rate: Pre procedure Pulse: 100           Post procedure Pulse: 100    Resp Rate: Pre procedure Respirations: 24           Post procedure Respirations: 24    Peak Flow: Pre bronchodilator             Post bronchodilator       Oxygen: O2 Device: Nasal cannula   Flow rate (L/min) 4.5     Changed: NO    SpO2: Pre procedure SpO2: 92 %   with oxygen              Post procedure SpO2: 94 %  with oxygen    Nebulizer Therapy: Current medications Aerosolized Medications: DuoNeb      Changed: NO      Problem List:   Patient Active Problem List   Diagnosis Code    Acute respiratory distress R06.03    Hyponatremia E87.1       Respiratory Therapist: Lore Rosario, RT

## 2023-02-11 NOTE — PROGRESS NOTES
0700 Bedside and Verbal shift change report given to Elly Rodríguez 171 (oncoming nurse) by Alisha Cagle RN (offgoing nurse). Report included the following information SBAR, Kardex, ED Summary, Intake/Output, MAR, Recent Results, and Cardiac Rhythm NSR, Sinus Tach . End of Shift Note    Bedside shift change report given to Elly Rodríguez 171 (oncoming nurse) by Sundar Aguirre RN (offgoing nurse). Report included the following information SBAR, Kardex, ED Summary, Intake/Output, MAR, Recent Results, and Cardiac Rhythm NSR, Sinus tach    Shift worked:  7a to 7p     Shift summary and any significant changes:     Pt had CXR (see results review)   OOB to [facility] several times this shift    Sitter at bedside all shift    Gave PRN ativan once    Pt sinus tach most of shift     Concerns for physician to address: N/A     Zone phone for oncoming shift:           Activity:  Activity Level: Up with Assistance  Number times ambulated in hallways past shift: 0  Number of times OOB to chair past shift: 0    Cardiac:   Cardiac Monitoring: Yes      Cardiac Rhythm: Sinus Tachy    Access:  Current line(s): PIV     Genitourinary:   Urinary status: voiding    Respiratory:   O2 Device: Nasal cannula  Chronic home O2 use?: NO  Incentive spirometer at bedside: NO       GI:  Last Bowel Movement Date: 02/07/23  Current diet:  DIET ONE TIME MESSAGE  ADULT ORAL NUTRITION SUPPLEMENT Breakfast, Dinner; Standard High Calorie/High Protein  ADULT DIET Regular; Safety Tray; Safety Tray (Disposables)  Passing flatus: YES  Tolerating current diet: YES       Pain Management:   Patient states pain is manageable on current regimen: YES    Skin:  Kelvin Score: 18  Interventions: turn team, speciality bed, float heels, increase time out of bed, foam dressing, PT/OT consult, limit briefs, internal/external urinary devices, and nutritional support     Patient Safety:  Fall Score:  Total Score: 1  Interventions: bed/chair alarm, assistive device (walker, cane, etc), gripper socks, pt to call before getting OOB, stay with me (per policy), sitter at bedside , and gait belt       Length of Stay:  Expected LOS: 3d 12h  Actual LOS: [address] [place] [address], RN

## 2023-02-11 NOTE — PROGRESS NOTES
Comprehensive Nutrition Assessment    Type and Reason for Visit: Initial (low BMI)    Nutrition Recommendations/Plan:   Continue current diet, d/c CHO restriction for no hx of DM and   Added Ensure plus BID     Malnutrition Assessment:  Malnutrition Status: At risk for malnutrition (specify) (02/11/23 1215)    Context:  Chronic illness     Findings of the 6 clinical characteristics of malnutrition:   Energy Intake:  Unable to assess  Weight Loss:  Unable to assess     Body Fat Loss:  Mild body fat loss, Orbital, Buccal region   Muscle Mass Loss:  Mild muscle mass loss, Clavicles (pectoralis &deltoids), Temples (temporalis)  Fluid Accumulation:  No significant fluid accumulation,     Strength:  Not performed     Nutrition Assessment:    Chart reviewed for low BMI. Pt medically noted for acute hypoxic respiratory failure, COPD exacerbation, multiple rib fractures, hyponatremia, suicidal ideations, alcohol abuse. Pt sleeping soundly at time of visit,  at bedside reports she did not sleep well last night, but did eat all of her breakfast this morning. NFPE limited d/t pt sleeping and mostly covered by blankets, but muscle/fat wasting of at least mild severity was noted on visible areas. MST not completed and there is no weight hx in the chart to review trends. Pt certainly at risk of micronutrient deficiencies given etoh abuse. Will add supplements and follow up. Patient Vitals for the past 168 hrs:   % Diet Eaten   02/11/23 0830 51 - 75%     Wt Readings from Last 5 Encounters:   02/11/23 43.5 kg (95 lb 14.4 oz)   ]    Nutrition Related Findings:    Labs: Na 121, K 2.9, phos 2.4. Meds: folvite, neutra-phos, prednisone, MVI, thiamine, urea, K phos. BM 2/7.      Wound Type: None    Current Nutrition Intake & Therapies:  Average Meal Intake: %  Average Supplement Intake: None ordered  DIET ONE TIME MESSAGE  ADULT ORAL NUTRITION SUPPLEMENT Breakfast, Dinner; Standard High Calorie/High Protein  ADULT DIET Regular; Safety Tray; Safety Tray (Disposables)    Anthropometric Measures:  Height: 5' 3\" (160 cm)  Ideal Body Weight (IBW): 115 lbs (52 kg)     Current Body Wt:  43.5 kg (95 lb 14.4 oz), 83.4 % IBW.  Standing scale  Current BMI (kg/m2): 17        Weight Adjustment: No adjustment                 BMI Category: Underweight (BMI less than 18.5)    Estimated Daily Nutrient Needs:  Energy Requirements Based On: Formula  Weight Used for Energy Requirements: Current  Energy (kcal/day): 1580 kcals (BMR x 1.3AF + 300 for wt gain)  Weight Used for Protein Requirements: Admission  Protein (g/day): 65g (1.5g/kg)  Method Used for Fluid Requirements: 1 ml/kcal  Fluid (ml/day): 1580mL or per MD    Nutrition Diagnosis:   Underweight related to inadequate protein-energy intake (& etoh abuse) as evidenced by BMI    Nutrition Interventions:   Food and/or Nutrient Delivery: Continue current diet, Start oral nutrition supplement  Nutrition Education/Counseling: No recommendations at this time  Coordination of Nutrition Care: Continue to monitor while inpatient       Goals:     Goals: PO intake 75% or greater, by next RD assessment       Nutrition Monitoring and Evaluation:   Behavioral-Environmental Outcomes: None identified  Food/Nutrient Intake Outcomes: Food and nutrient intake, Supplement intake  Physical Signs/Symptoms Outcomes: Biochemical data, GI status, Weight, Nutrition focused physical findings    Discharge Planning:    Continue current diet, Continue oral nutrition supplement    Augusto Real RD  Contact: [id number]

## 2023-02-11 NOTE — PROGRESS NOTES
Problem: Pressure Injury - Risk of  Goal: *Prevention of pressure injury  Description: Document Kelvin Scale and appropriate interventions in the flowsheet. Outcome: Progressing Towards Goal  Note: Pressure Injury Interventions:  Sensory Interventions: Assess changes in LOC    Moisture Interventions: Absorbent underpads    Activity Interventions: Assess need for specialty bed    Mobility Interventions: Assess need for specialty bed    Nutrition Interventions: Document food/fluid/supplement intake    Friction and Shear Interventions: Apply protective barrier, creams and emollients                Problem: Patient Education: Go to Patient Education Activity  Goal: Patient/Family Education  Outcome: Progressing Towards Goal     Problem: Falls - Risk of  Goal: *Absence of Falls  Description: Document Coreen Leung Fall Risk and appropriate interventions in the flowsheet.   Outcome: Progressing Towards Goal  Note: Fall Risk Interventions:            Medication Interventions: Bed/chair exit alarm                   Problem: Patient Education: Go to Patient Education Activity  Goal: Patient/Family Education  Outcome: Progressing Towards Goal     Problem: Risk for Elopement  Goal: Patient will not exit the unit/facility without proper escort  Outcome: Progressing Towards Goal     Problem: Breathing Pattern - Ineffective  Goal: *Absence of hypoxia  Outcome: Progressing Towards Goal  Goal: *Use of effective breathing techniques  Outcome: Progressing Towards Goal  Goal: *PALLIATIVE CARE:  Alleviation of Dyspnea  Outcome: Progressing Towards Goal     Problem: Patient Education: Go to Patient Education Activity  Goal: Patient/Family Education  Outcome: Progressing Towards Goal     Problem: Suicide  Goal: *STG: Remains safe in hospital  Outcome: Progressing Towards Goal  Goal: *STG: Seeks staff when feelings of self harm or harm towards others arise  Outcome: Progressing Towards Goal  Goal: *STG: Attends activities and groups  Outcome: Progressing Towards Goal  Goal: *STG:  Verbalizes alternative ways of dealing with maladaptive feelings/behaviors  Outcome: Progressing Towards Goal  Goal: *STG/LTG: Complies with medication therapy  Outcome: Progressing Towards Goal  Goal: *STG/LTG: No longer expresses self destructive or suicidal thoughts  Outcome: Progressing Towards Goal  Goal: *LTG:  Identifies available community resources  Outcome: Progressing Towards Goal  Goal: *LTG:  Develops proactive suicide prevention plan  Outcome: Progressing Towards Goal  Goal: Interventions  Outcome: Progressing Towards Goal     Problem: Patient Education: Go to Patient Education Activity  Goal: Patient/Family Education  Outcome: Progressing Towards Goal     Problem: Patient Education: Go to Patient Education Activity  Goal: Patient/Family Education  Outcome: Progressing Towards Goal     Problem: Patient Education: Go to Patient Education Activity  Goal: Patient/Family Education  Outcome: Progressing Towards Goal

## 2023-02-11 NOTE — PROGRESS NOTES
[facility]  Progress Note        NAME: Fernando Cazares       :  1965       MRN:  [id number]     Date/Time: 2023    Risk of deterioration: medium       Assessment:    Plan:  Acute on chronic hyponatremia  ETOH  HTN  Psych  Mucous plugging/sob Sodium marla 118  Sodium 122 today  Avoid free water/hypotonic fluids  IN naphos infusion-as phos low-  Continue urea na  Ivf stopped/nacl stopped due to pulm symptoms-on rounds, pt clear-had received a breathing treatment and much improved-pcxr (P)    I have [place] from [facility] [place]has been seen by nephrology there in past-working diagnosis etoh/increased water intake    Repeat labs later today-tolvaptan if still low  (-) 2.3 liters urine yesterday, 850 this am  Weights inaccurate  CTA (-) PE  Repeat urine studies-initial studies with low urine sodium/cloride-suggests she would respond to ivf. However, she hasn't  Water restriction     Subjective:     Chief Complaint:  I just had a breathing treatment-I'm much better    Review of Systems: no n/v/cp/f/c  (+) cough/sob  (-) dysuria    Objective:     VITALS:   Last 24hrs VS reviewed since prior progress note.  Most recent are:  Visit Vitals  /86 (BP 1 Location: Left upper arm, BP Patient Position: At rest)   Pulse (!) 117   Temp 98.7 °F (37.1 °C)   Resp 22   Ht 5' 3\" (1.6 m)   Wt 43.5 kg (95 lb 14.4 oz)   SpO2 98%   BMI 16.99 kg/m²     SpO2 Readings from Last 6 Encounters:   23 98%    O2 Flow Rate (L/min): 4 l/min     Intake/Output Summary (Last 24 hours) at 2023 1136  Last data filed at 2023 1059  Gross per 24 hour   Intake --   Output 3150 ml   Net -3150 ml          Telemetry Reviewed       PHYSICAL EXAM:    General   well developed, chronically ill appearing wf  EENT  Normocephalic, Atraumatic,  EOMI, sclera clear,  Respiratory   Clear To Auscultation bilaterally  Cardiology  Regular Rate and Rythmn    Abdominal  Soft, non-tender, non-distended, positive bowel sounds  Extremities  No clubbing, cyanosis, or edema. Pulses intact.               Lab Data Reviewed: (see below)    Medications Reviewed: (see below)    PMH/SH reviewed - no change compared to H&P________________    Attending Physician: Valentín Burnette MD     ____________________________________________________  MEDICATIONS:  Current Facility-Administered Medications   Medication Dose Route Frequency    potassium, sodium phosphates (NEUTRA-PHOS) packet 2 Packet  2 Packet Oral QID    urea (URE-NA) 15 gram packet 2 Packet  2 Packet Oral DAILY    sodium chloride (OCEAN) 0.65 % nasal squeeze bottle 2 Spray  2 Spray Both Nostrils Q2H PRN    [Held by provider] 0.9% sodium chloride infusion  75 mL/hr IntraVENous CONTINUOUS    [Held by provider] sodium chloride soluble tablet 1,000 mg  1 g Oral BID    albuterol-ipratropium (DUO-NEB) 2.5 MG-0.5 MG/3 ML  3 mL Nebulization Q4H PRN    predniSONE (DELTASONE) tablet 40 mg  40 mg Oral DAILY WITH BREAKFAST    therapeutic multivitamin (THERAGRAN) tablet 1 Tablet  1 Tablet Oral DAILY    folic acid (FOLVITE) tablet 1 mg  1 mg Oral DAILY    thiamine mononitrate (B-1) tablet 100 mg  100 mg Oral DAILY    sodium chloride (NS) flush 5-40 mL  5-40 mL IntraVENous Q8H    sodium chloride (NS) flush 5-40 mL  5-40 mL IntraVENous PRN    acetaminophen (TYLENOL) tablet 650 mg  650 mg Oral Q6H PRN    Or    acetaminophen (TYLENOL) suppository 650 mg  650 mg Rectal Q6H PRN    polyethylene glycol (MIRALAX) packet 17 g  17 g Oral DAILY PRN    ondansetron (ZOFRAN ODT) tablet 4 mg  4 mg Oral Q8H PRN    Or    ondansetron (ZOFRAN) injection 4 mg  4 mg IntraVENous Q6H PRN    enoxaparin (LOVENOX) injection 40 mg  40 mg SubCUTAneous DAILY    ipratropium (ATROVENT) 0.02 % nebulizer solution 0.5 mg  0.5 mg Nebulization Q4H PRN    levalbuterol (XOPENEX) nebulizer soln 1.25 mg/3 mL  1.25 mg Nebulization Q4H PRN    LORazepam (ATIVAN) injection 1 mg  1 mg IntraVENous Q2H PRN    LORazepam (ATIVAN) injection 2 mg  2 mg IntraVENous Q2H PRN    nicotine (NICODERM CQ) 21 mg/24 hr patch 1 Patch  1 Patch TransDERmal DAILY    balsam peru-castor oiL (VENELEX) ointment   Topical BID        LABS:  No results for input(s): WBC, HGB, HCT, PLT, HGBEXT, HCTEXT, PLTEXT, HGBEXT, HCTEXT, PLTEXT in the last 72 hours. Recent Labs     02/11/23  0147 02/10/23  0530 02/09/23  1210 02/09/23  0433   * 123* 124* 120*   K 2.9* 3.7 3.8 5.4*   CL 81* 83* 81* 80*   CO2 32 36* 40* 37*   BUN 12 7 9 9   CREA 0.38* 0.32* 0.37* 0.43*   * 99 106* 91   CA 8.0* 8.4* 8.4* 8.6   MG  --  1.6  --  1.6   PHOS 2.4* 2.0*  --  3.4       Recent Labs     02/10/23  0530 02/09/23  0433   ALT 19 24   AP 83 87   TBILI 0.6 0.9   TP 6.1* 6.2*   ALB 2.7* 2.6*   GLOB 3.4 3.6       No results for input(s): INR, PTP, APTT, INREXT, INREXT in the last 72 hours. No results for input(s): FE, TIBC, PSAT, FERR in the last 72 hours. No results for input(s): PH, PCO2, PO2 in the last 72 hours. No results for input(s): CPK, CKNDX, TROIQ in the last 72 hours.     No lab exists for component: CPKMB  No results found for: Nirmala Medici

## 2023-02-11 NOTE — PROGRESS NOTES
Anticoagulation Dosing    Indication:  DVT ppx    Estimated Creatinine Clearance: 60.2 mL/min (A) (by C-G formula based on SCr of 0.46 mg/dL (L)). Estimated Creatinine Clearance (using IBW):72.5 mL/min    Recent Labs     02/11/23  1418 02/11/23  0147 02/10/23  0530 02/09/23  1210 02/09/23  0433   CREA 0.46* 0.38* 0.32* 0.37* 0.43*   ALB  --   --  2.7*  --  2.6*         Wt Readings from Last 1 Encounters:   02/11/23 43.5 kg (95 lb 14.4 oz)     Ht Readings from Last 1 Encounters:   02/11/23 160 cm (63\")     Body mass index is 16.99 kg/m². Plan:  adjusted lovenox to 30mg daily due to patient's renal function and wt.            Thank you,  Erma Rincon, [facility]

## 2023-02-11 NOTE — PROGRESS NOTES
Received notification from bedside RN about patient with regards to: K+ 2.9. Reports SOB and new crackles on auscultation per RN. Pro BNP 2,379 (previous was 1,480).  Noted Phos 2.4  VS: /90, HR 93, RR 20, O2 sat 95% on NC 4.5    Intervention given:   - Klor con 40 meq PO x 2 doses  - Held IVF  - Lasix 40 mg IV x 1 dose  - Kphos 20 mmol IV x 1 dose  - BMP, Magnesium and Phos for tomorrow AM

## 2023-02-11 NOTE — PROGRESS NOTES
Problem: Falls - Risk of  Goal: *Absence of Falls  Description: Document Jaswant Leon Fall Risk and appropriate interventions in the flowsheet. Outcome: Progressing Towards Goal  Note: Fall Risk Interventions:            Medication Interventions: Bed/chair exit alarm, Patient to call before getting OOB, Teach patient to arise slowly                   Problem: Falls - Risk of  Goal: *Absence of Falls  Description: Document Jaswant Leon Fall Risk and appropriate interventions in the flowsheet.   Outcome: Progressing Towards Goal  Note: Fall Risk Interventions:            Medication Interventions: Bed/chair exit alarm, Patient to call before getting OOB, Teach patient to arise slowly

## 2023-02-12 LAB
ANION GAP SERPL CALC-SCNC: 5 MMOL/L (ref 5–15)
ATRIAL RATE: 130 BPM
ATRIAL RATE: 131 BPM
BASOPHILS # BLD: 0 K/UL (ref 0–0.1)
BASOPHILS NFR BLD: 0 % (ref 0–1)
BUN SERPL-MCNC: 20 MG/DL (ref 6–20)
BUN/CREAT SERPL: 54 (ref 12–20)
CALCIUM SERPL-MCNC: 9.2 MG/DL (ref 8.5–10.1)
CALCULATED P AXIS, ECG09: 54 DEGREES
CALCULATED P AXIS, ECG09: 54 DEGREES
CALCULATED R AXIS, ECG10: 60 DEGREES
CALCULATED R AXIS, ECG10: 69 DEGREES
CALCULATED T AXIS, ECG11: 46 DEGREES
CALCULATED T AXIS, ECG11: 55 DEGREES
CHLORIDE SERPL-SCNC: 83 MMOL/L (ref 97–108)
CO2 SERPL-SCNC: 36 MMOL/L (ref 21–32)
CORTIS AM PEAK SERPL-MCNC: 5.1 UG/DL (ref 4.3–22.45)
CREAT SERPL-MCNC: 0.37 MG/DL (ref 0.55–1.02)
DIAGNOSIS, 93000: NORMAL
DIAGNOSIS, 93000: NORMAL
DIFFERENTIAL METHOD BLD: ABNORMAL
EOSINOPHIL # BLD: 0 K/UL (ref 0–0.4)
EOSINOPHIL NFR BLD: 1 % (ref 0–7)
ERYTHROCYTE [DISTWIDTH] IN BLOOD BY AUTOMATED COUNT: 13.8 % (ref 11.5–14.5)
GLUCOSE SERPL-MCNC: 98 MG/DL (ref 65–100)
HCT VFR BLD AUTO: 39.5 % (ref 35–47)
HGB BLD-MCNC: 13.4 G/DL (ref 11.5–16)
IMM GRANULOCYTES # BLD AUTO: 0 K/UL (ref 0–0.04)
IMM GRANULOCYTES NFR BLD AUTO: 0 % (ref 0–0.5)
LYMPHOCYTES # BLD: 1.5 K/UL (ref 0.8–3.5)
LYMPHOCYTES NFR BLD: 36 % (ref 12–49)
MAGNESIUM SERPL-MCNC: 1.7 MG/DL (ref 1.6–2.4)
MCH RBC QN AUTO: 32.4 PG (ref 26–34)
MCHC RBC AUTO-ENTMCNC: 33.9 G/DL (ref 30–36.5)
MCV RBC AUTO: 95.4 FL (ref 80–99)
MONOCYTES # BLD: 0.8 K/UL (ref 0–1)
MONOCYTES NFR BLD: 19 % (ref 5–13)
NEUTS SEG # BLD: 1.8 K/UL (ref 1.8–8)
NEUTS SEG NFR BLD: 44 % (ref 32–75)
NRBC # BLD: 0 K/UL (ref 0–0.01)
NRBC BLD-RTO: 0 PER 100 WBC
OSMOLALITY SERPL: 274 MOSM/KG H2O
OSMOLALITY UR: 191 MOSM/KG H2O
P-R INTERVAL, ECG05: 152 MS
P-R INTERVAL, ECG05: 156 MS
PHOSPHATE SERPL-MCNC: 4.1 MG/DL (ref 2.6–4.7)
PLATELET # BLD AUTO: 185 K/UL (ref 150–400)
PMV BLD AUTO: 9.7 FL (ref 8.9–12.9)
POTASSIUM SERPL-SCNC: 3.7 MMOL/L (ref 3.5–5.1)
Q-T INTERVAL, ECG07: 298 MS
Q-T INTERVAL, ECG07: 300 MS
QRS DURATION, ECG06: 76 MS
QRS DURATION, ECG06: 76 MS
QTC CALCULATION (BEZET), ECG08: 440 MS
QTC CALCULATION (BEZET), ECG08: 441 MS
RBC # BLD AUTO: 4.14 M/UL (ref 3.8–5.2)
SODIUM SERPL-SCNC: 124 MMOL/L (ref 136–145)
VENTRICULAR RATE, ECG03: 130 BPM
VENTRICULAR RATE, ECG03: 131 BPM
WBC # BLD AUTO: 4.1 K/UL (ref 3.6–11)

## 2023-02-12 PROCEDURE — 74011250637 HC RX REV CODE- 250/637: Performed by: NURSE PRACTITIONER

## 2023-02-12 PROCEDURE — 65270000046 HC RM TELEMETRY

## 2023-02-12 PROCEDURE — 74011250637 HC RX REV CODE- 250/637: Performed by: INTERNAL MEDICINE

## 2023-02-12 PROCEDURE — 84100 ASSAY OF PHOSPHORUS: CPT

## 2023-02-12 PROCEDURE — 85025 COMPLETE CBC W/AUTO DIFF WBC: CPT

## 2023-02-12 PROCEDURE — 94640 AIRWAY INHALATION TREATMENT: CPT

## 2023-02-12 PROCEDURE — 36415 COLL VENOUS BLD VENIPUNCTURE: CPT

## 2023-02-12 PROCEDURE — 93005 ELECTROCARDIOGRAM TRACING: CPT

## 2023-02-12 PROCEDURE — 80048 BASIC METABOLIC PNL TOTAL CA: CPT

## 2023-02-12 PROCEDURE — 83935 ASSAY OF URINE OSMOLALITY: CPT

## 2023-02-12 PROCEDURE — 74011000250 HC RX REV CODE- 250: Performed by: INTERNAL MEDICINE

## 2023-02-12 PROCEDURE — 83930 ASSAY OF BLOOD OSMOLALITY: CPT

## 2023-02-12 PROCEDURE — 74011250636 HC RX REV CODE- 250/636: Performed by: INTERNAL MEDICINE

## 2023-02-12 PROCEDURE — 83735 ASSAY OF MAGNESIUM: CPT

## 2023-02-12 PROCEDURE — 84295 ASSAY OF SERUM SODIUM: CPT

## 2023-02-12 PROCEDURE — 74011000250 HC RX REV CODE- 250: Performed by: NURSE PRACTITIONER

## 2023-02-12 PROCEDURE — 74011636637 HC RX REV CODE- 636/637: Performed by: INTERNAL MEDICINE

## 2023-02-12 PROCEDURE — 82533 TOTAL CORTISOL: CPT

## 2023-02-12 PROCEDURE — 74011250636 HC RX REV CODE- 250/636: Performed by: NURSE PRACTITIONER

## 2023-02-12 RX ORDER — POTASSIUM CHLORIDE 750 MG/1
20 TABLET, FILM COATED, EXTENDED RELEASE ORAL
Status: COMPLETED | OUTPATIENT
Start: 2023-02-12 | End: 2023-02-12

## 2023-02-12 RX ORDER — METOPROLOL TARTRATE 25 MG/1
25 TABLET, FILM COATED ORAL EVERY 12 HOURS
Status: DISCONTINUED | OUTPATIENT
Start: 2023-02-12 | End: 2023-02-21 | Stop reason: HOSPADM

## 2023-02-12 RX ADMIN — POTASSIUM CHLORIDE 20 MEQ: 750 TABLET, FILM COATED, EXTENDED RELEASE ORAL at 10:35

## 2023-02-12 RX ADMIN — FOLIC ACID 1 MG: 1 TABLET ORAL at 08:06

## 2023-02-12 RX ADMIN — METOPROLOL TARTRATE 25 MG: 25 TABLET, FILM COATED ORAL at 21:25

## 2023-02-12 RX ADMIN — LEVALBUTEROL HYDROCHLORIDE 1.25 MG: 1.25 SOLUTION RESPIRATORY (INHALATION) at 07:42

## 2023-02-12 RX ADMIN — LORAZEPAM 1 MG: 2 INJECTION INTRAMUSCULAR at 10:35

## 2023-02-12 RX ADMIN — CASTOR OIL AND BALSAM, PERU: 788; 87 OINTMENT TOPICAL at 08:07

## 2023-02-12 RX ADMIN — LEVALBUTEROL HYDROCHLORIDE 1.25 MG: 1.25 SOLUTION RESPIRATORY (INHALATION) at 01:22

## 2023-02-12 RX ADMIN — IPRATROPIUM BROMIDE 0.5 MG: 0.5 SOLUTION RESPIRATORY (INHALATION) at 07:42

## 2023-02-12 RX ADMIN — ENOXAPARIN SODIUM 30 MG: 100 INJECTION SUBCUTANEOUS at 08:06

## 2023-02-12 RX ADMIN — IPRATROPIUM BROMIDE 0.5 MG: 0.5 SOLUTION RESPIRATORY (INHALATION) at 01:22

## 2023-02-12 RX ADMIN — SODIUM CHLORIDE, PRESERVATIVE FREE 10 ML: 5 INJECTION INTRAVENOUS at 13:37

## 2023-02-12 RX ADMIN — SODIUM CHLORIDE, PRESERVATIVE FREE 10 ML: 5 INJECTION INTRAVENOUS at 21:26

## 2023-02-12 RX ADMIN — Medication 15 MG: at 13:36

## 2023-02-12 RX ADMIN — IPRATROPIUM BROMIDE AND ALBUTEROL SULFATE 3 ML: 2.5; .5 SOLUTION RESPIRATORY (INHALATION) at 22:29

## 2023-02-12 RX ADMIN — PREDNISONE 40 MG: 20 TABLET ORAL at 08:06

## 2023-02-12 RX ADMIN — LORAZEPAM 2 MG: 2 INJECTION INTRAMUSCULAR at 08:05

## 2023-02-12 RX ADMIN — LORAZEPAM 2 MG: 2 INJECTION INTRAMUSCULAR at 13:36

## 2023-02-12 RX ADMIN — THERA TABS 1 TABLET: TAB at 08:06

## 2023-02-12 RX ADMIN — METOPROLOL TARTRATE 25 MG: 25 TABLET, FILM COATED ORAL at 12:00

## 2023-02-12 RX ADMIN — SODIUM CHLORIDE, PRESERVATIVE FREE 10 ML: 5 INJECTION INTRAVENOUS at 07:01

## 2023-02-12 RX ADMIN — THIAMINE HCL TAB 100 MG 100 MG: 100 TAB at 08:06

## 2023-02-12 RX ADMIN — CASTOR OIL AND BALSAM, PERU: 788; 87 OINTMENT TOPICAL at 21:33

## 2023-02-12 NOTE — PROGRESS NOTES
02/12/23 0659   Vitals   Temp 98.9 °F (37.2 °C)   Temp Source Axillary   Pulse (Heart Rate) (!) 117   Heart Rate Source Monitor   Resp Rate 20   O2 Sat (%) 95 %   Level of Consciousness 0   /71   MAP (Monitor) 85   MAP (Calculated) 90   BP 1 Location Left upper arm   BP 1 Method Automatic   BP Patient Position Sitting   Cardiac Rhythm Sinus Tachy   MEWS Score 3     MEWS 3 due to HR, will notify MD

## 2023-02-12 NOTE — PROGRESS NOTES
Hospitalist Progress Note    NAME: Bill Sawant   :  1965   MRN:  [id number]       Assessment / Plan:  Acute hypoxic respiratory failure, POA  COPD exacerbation, POA  Multiple rib fractures, POA  - EKG on admission with no acute ischemic changes  - proBNP around 1600  - On oxygen 2 L via nasal cannula  - CTA with no PE. Acute right ninth rib fracture, multiple bilateral remote rib fractures  - Spirometry as tolerated  - Add DuoNeb, arformoterol/budesonide, prednisone  - Wean off oxygen as tolerated, likely she will need oxygen on discharge  - Oxygen challenge before discharge  -PT OT eval     Acute on chronic hyponatremia, POA  - Presented with sodium 118, baseline around 126  - sodium has been fluctuating, 124 today  - Nephrology input is appreciated  - FR  - Patient given tolvaptan  - Follow BMP very closely  - Etiology can be related for alcohol abuse and possibly excess water intake although patient is denying    Suicidal ideation    - Suicide precautions, constant observation  - Psychiatrist evaluated patient and felt the diagnosis is adjustment disorder, recommendation to discharge home and follow-up as an outpatient    Alcohol abuse  - Patient reported that she drinks on daily basis  - Watch for alcohol withdrawal syndrome    Sinus tachycardia  Introduce a BB    Nicotine use  - Counseling was provided about smoking cessation    Incidental finding on CTA chest    Compression of multiple vertebral bodies. Partial atelectasis/scarring in the right middle lobe. Partially opacified bronchioles supplying the posterior aspect of the left  lower lobe suggesting mucus plugging and/or bronchitis. Will need close follow-up as an outpatient    18.5 - 24.9 Normal weight / Body mass index is 17.07 kg/m².     Estimated discharge date:   Barriers: PT OT, oxygen challenge, sodium improvement    Code status: Full  Prophylaxis: Lovenox  Recommended Disposition:  TBD     Subjective:     Patient was seen and examined. No acute events overnight. Discussed with RN overnight events. Patient seen and examined. Having some chest tightness this morning. Says she could use a breathing treatment. Otherwise no additional complaints    Review of Systems:  Symptom Y/N Comments  Symptom Y/N Comments   Fever/Chills n   Chest Pain y Pleuritic   Poor Appetite    Edema     Cough n   Abdominal Pain n    Sputum    Joint Pain     SOB/JARAMILLO n   Pruritis/Rash     Nausea/vomit n   Tolerating PT/OT     Diarrhea    Tolerating Diet y    Constipation    Other       Could NOT obtain due to:          Objective:     VITALS:   Last 24hrs VS reviewed since prior progress note. Most recent are:  Patient Vitals for the past 24 hrs:   Temp Pulse Resp BP SpO2   02/12/23 1115 99.8 °F (37.7 °C) -- -- -- --   02/12/23 0744 -- -- -- -- 90 %   02/12/23 0659 98.9 °F (37.2 °C) (!) 117 20 129/71 95 %   02/12/23 0204 97.7 °F (36.5 °C) (!) 113 20 115/66 92 %   02/12/23 0123 -- -- -- -- (!) 88 %   02/11/23 2350 98.5 °F (36.9 °C) 100 20 107/66 97 %   02/11/23 1921 99.4 °F (37.4 °C) 99 20 111/63 100 %   02/11/23 1519 97.7 °F (36.5 °C) (!) 107 20 (!) 145/86 100 %         Intake/Output Summary (Last 24 hours) at 2/12/2023 1135  Last data filed at 2/11/2023 2351  Gross per 24 hour   Intake --   Output 1110 ml   Net -1110 ml          I had a face to face encounter and independently examined this patient on 2/12/2023, as outlined below:  PHYSICAL EXAM:  General: WD, WN. Alert, cooperative, no acute distress    EENT:  EOMI. Anicteric sclerae. MMM  Resp:  bilaterally, +ve wheezing, no rales. No accessory muscle use  CV:  Regular  rhythm,  No edema  GI:  Soft, Non distended, Non tender. +Bowel sounds  Neurologic:  EOMs intact. No facial asymmetry. No aphasia or slurred speech. Symmetrical strength, Sensation grossly intact. Alert and oriented X 4.     Psych:   Good insight. Not anxious nor agitated  Skin:  No rashes.   No jaundice    Reviewed most current lab test results and cultures  YES  Reviewed most current radiology test results   YES  Review and summation of old records today    NO  Reviewed patient's current orders and MAR    YES  PMH/SH reviewed - no change compared to H&P  ________________________________________________________________________  Care Plan discussed with:    Comments   Patient X    Family      RN X    Care Manager     Consultant                        Multidiciplinary team rounds were held today with , nursing, pharmacist and clinical coordinator. Patient's plan of care was discussed; medications were reviewed and discharge planning was addressed. ________________________________________________________________________        Comments   >50% of visit spent in counseling and coordination of care X    ________________________________________________________________________  Renetta Sutton MD     Procedures: see electronic medical records for all procedures/Xrays and details which were not copied into this note but were reviewed prior to creation of Plan. LABS:  I reviewed today's most current labs and imaging studies.   Pertinent labs include:  Recent Labs     02/12/23  0159   WBC 4.1   HGB 13.4   HCT 39.5          Recent Labs     02/12/23  0159 02/11/23  1418 02/11/23  0147 02/10/23  0530   * 120* 121* 123*   K 3.7 4.3 2.9* 3.7   CL 83* 78* 81* 83*   CO2 36* 35* 32 36*   GLU 98 131* 121* 99   BUN 20 42* 12 7   CREA 0.37* 0.46* 0.38* 0.32*   CA 9.2 8.3* 8.0* 8.4*   MG 1.7  --   --  1.6   PHOS 4.1 4.2 2.4* 2.0*   ALB  --   --   --  2.7*   TBILI  --   --   --  0.6   ALT  --   --   --  19         Signed: Renetta Sutton MD

## 2023-02-12 NOTE — PROGRESS NOTES
0700 Bedside and Verbal shift change report given to [id number] [facility] (oncoming nurse) by Lori Malhotra (offgoing nurse). Report included the following information SBAR, Kardex, ED Summary, Intake/Output, MAR, Recent Results, and Cardiac Rhythm Sinus Rhythm, Sinus Tach . 0814 Pt HR sustaining in 130s, Notified Manda MENENDEZ - orders received for EKG    End of Shift Note    Bedside shift change report given to [address] (oncoming nurse) by Lexine Hamman, RN (offgoing nurse). Report included the following information SBAR, Kardex, ED Summary, Intake/Output, MAR, Recent Results, and Cardiac Rhythm Sinus Rhythm, Sinus Tach    Shift worked:  7a to 7p     Shift summary and any significant changes:     See above     PO Metoprolol added  Gave PRN ativan today     Concerns for physician to address:  N/A     Zone phone for oncoming shift:           Activity:  Activity Level: Up with Assistance  Number times ambulated in hallways past shift: 0  Number of times OOB to chair past shift: 0    Cardiac:   Cardiac Monitoring: Yes      Cardiac Rhythm: Sinus Rhythm    Access:  Current line(s): PIV     Genitourinary:   Urinary status: voiding    Respiratory:   O2 Device: Nasal cannula  Chronic home O2 use?: NO  Incentive spirometer at bedside: YES       GI:  Last Bowel Movement Date: 02/10/23  Current diet:  DIET ONE TIME MESSAGE  ADULT ORAL NUTRITION SUPPLEMENT Breakfast, Dinner; Standard High Calorie/High Protein  ADULT DIET Regular; Safety Tray; Safety Tray (Disposables)  Passing flatus: YES  Tolerating current diet: YES       Pain Management:   Patient states pain is manageable on current regimen: YES    Skin:  Kelvin Score: 18  Interventions: turn team, speciality bed, float heels, increase time out of bed, foam dressing, PT/OT consult, limit briefs, internal/external urinary devices, and nutritional support     Patient Safety:  Fall Score:  Total Score: 1  Interventions: bed/chair alarm, assistive device (walker, cane, etc), gripper socks, INFORMATION YOUR PROVIDER WANTS YOU TO KNOW    Thank you for choosing Dr. Rafy Simon at [facility]note 3[facility] Pain Management clinic. It was a pleasure to care for you today!    Clinic Policy:  Cancellation and No Show: If you must cancel a visit, please give minimally 24 hours notice so we can accommodate other patients that have been waiting to be seen. Do not rely on a reminder call for your appointment as it is a courtesy and not guaranteed. Please write it on your calendar. You are responsible to be at all scheduled appointments.   You can cancel your appointment by calling our office at [phone] during our normal business hours which are as follows:  Monday-Thursday 8:00 am to 4:30 pm  Friday 8:00 am to 2:00 pm      Messages:  Messages left for Dr. Rafy Simon will be returned within 24-48 business hours.    Medication Requests:  We need up to 7 business days notice for refills to be completed. Please contact your preferred pharmacy for all non-narcotic refills. The pharmacy will contact the office for those refills. It is imperative that you plan ahead as we are unable to guarantee your refill by a certain date if this is not followed. If you miss appointments, you may not get a refill. When calling for refills or other concerns, please take into consideration that we are closed for holidays.    No medication changes are made over the phone, if you feel that a medication change is needed, please make an appointment.    Test results:  Any tests ordered by Dr. Simon will be reviewed at your next appointment.    Forms (FMLA/ Disability):  Please complete your portion of any forms prior to bringing them into the office. This includes adding a telephone number where you can be reached during normal business hours. An appointment will need to be made to complete these forms with Dr Simon. It is very helpful if you are able to provide a job description. Please note that if you are requesting  disability paperwork, you will have to complete a Functional Capacity Evaluation. This involves an evaluation that lasts approximately 4 hours and you will be responsible to call your insurance company to verify it is a covered expense.     If paperwork needs to be completed, an appointment needs to be made so Dr Simon can fill it out with you during that appointment. No exceptions.     To improve your clinical care, we practice treatments that often include Urine Drug Testing, a tool that helps to best monitor your care.    Reports are always confidential.    Urine Drug Testing (UDT) can be a valuable tool in clinical practices that include prescription of opioid analgesics for treatment of chronic pain. The overriding principle is that this testing is used to help the patient, and to enhance positive outcomes. The issue of testing is complex and the purpose of this guidance document is to assist the practitioner in formulating the rationale for testing, as well as to provide a framework for using test results in a positive way for the patient. Please note that all Urine Drug testing is ran through a third party lab.     For Urine Drug Testing billing questions, please contact the [employer] billing dept:      [phone]  choose option 3 for billing     For more information, visit [email or web address]    For Pain Management Clinic Billing Questions Please call [phone]      Special note regarding Covid-19 vaccine: If you are having a procedure that will include the injection of steroids and are planning to get a Covid-19 vaccine please make sure the vaccine is scheduled at lease two weeks prior to your procedure and/or at least two weeks after your procedure.       We know how important your pain management is and together we can help you live your best life.    A key factor to living with a spine or back pain condition is staying healthy. Overall wellness is a combination of a balanced diet, appropriate  pt to call before getting OOB, stay with me (per policy), sitter at bedside , and gait belt       Length of Stay:  Expected LOS: 3d 12h  Actual LOS: [address] Angel Cardenas RN exercise and physical activity, restful sleep and positive lifestyle choices.    PLEASE VISIT [email or web address] AS DISCUSSED WITH DR. VERGARA TO LEARN MORE ABOUT YOUR CONDITION(S), TREATMENTS, ARTICLES, VIDEOS (INCLUDING VIDEOS OF ALL PROCEDURES), IMAGE LIBRARY, FORUMS, & BLOGS.     FOR ANY INQUIRIES THAT YOU WOULD LIKE TO LOOK UP, PLEASE BE SURE TO USE REPUTABLE RESOURCES.      Your opinion matters!  Our desire is to increase your overall state of wellness and improve your quality of life with individualized patient care and innovative interventional modalities.    In the next few weeks, you may receive a [employer] survey regarding your clinic visit with us today. Your responses on this survey help us to maintain and improve the care we provide to you! We look forward to hearing from you!    A key factor to living with a spine or back pain condition is staying healthy. Overall wellness is a combination of a balanced diet, appropriate exercise and physical activity, restful sleep and positive lifestyle choices.

## 2023-02-12 NOTE — PROGRESS NOTES
Problem: Pressure Injury - Risk of  Goal: *Prevention of pressure injury  Description: Document Kelvin Scale and appropriate interventions in the flowsheet. Outcome: Progressing Towards Goal  Note: Pressure Injury Interventions:  Sensory Interventions: Assess changes in LOC    Moisture Interventions: Absorbent underpads    Activity Interventions: Assess need for specialty bed    Mobility Interventions: Assess need for specialty bed    Nutrition Interventions: Document food/fluid/supplement intake    Friction and Shear Interventions: Apply protective barrier, creams and emollients, HOB 30 degrees or less                Problem: Falls - Risk of  Goal: *Absence of Falls  Description: Document Caitlin Fall Risk and appropriate interventions in the flowsheet. Outcome: Progressing Towards Goal  Note: Fall Risk Interventions:            Medication Interventions: Bed/chair exit alarm                   Problem: Pressure Injury - Risk of  Goal: *Prevention of pressure injury  Description: Document Kelvin Scale and appropriate interventions in the flowsheet. Outcome: Progressing Towards Goal  Note: Pressure Injury Interventions:  Sensory Interventions: Assess changes in LOC    Moisture Interventions: Absorbent underpads    Activity Interventions: Assess need for specialty bed    Mobility Interventions: Assess need for specialty bed    Nutrition Interventions: Document food/fluid/supplement intake    Friction and Shear Interventions: Apply protective barrier, creams and emollients, HOB 30 degrees or less                Problem: Falls - Risk of  Goal: *Absence of Falls  Description: Document Caitlin Fall Risk and appropriate interventions in the flowsheet.   Outcome: Progressing Towards Goal  Note: Fall Risk Interventions:            Medication Interventions: Bed/chair exit alarm

## 2023-02-12 NOTE — PROGRESS NOTES
Problem: Pressure Injury - Risk of  Goal: *Prevention of pressure injury  Description: Document Kelvin Scale and appropriate interventions in the flowsheet. Outcome: Progressing Towards Goal  Note: Pressure Injury Interventions:  Sensory Interventions: Assess changes in LOC    Moisture Interventions: Absorbent underpads    Activity Interventions: Assess need for specialty bed    Mobility Interventions: Assess need for specialty bed    Nutrition Interventions: Document food/fluid/supplement intake    Friction and Shear Interventions: Apply protective barrier, creams and emollients, HOB 30 degrees or less                Problem: Patient Education: Go to Patient Education Activity  Goal: Patient/Family Education  Outcome: Progressing Towards Goal     Problem: Falls - Risk of  Goal: *Absence of Falls  Description: Document Caitlin Fall Risk and appropriate interventions in the flowsheet.   Outcome: Progressing Towards Goal  Note: Fall Risk Interventions:            Medication Interventions: Bed/chair exit alarm                   Problem: Patient Education: Go to Patient Education Activity  Goal: Patient/Family Education  Outcome: Progressing Towards Goal     Problem: Risk for Elopement  Goal: Patient will not exit the unit/facility without proper escort  Outcome: Progressing Towards Goal     Problem: Breathing Pattern - Ineffective  Goal: *Absence of hypoxia  Outcome: Progressing Towards Goal  Goal: *Use of effective breathing techniques  Outcome: Progressing Towards Goal  Goal: *PALLIATIVE CARE:  Alleviation of Dyspnea  Outcome: Progressing Towards Goal     Problem: Patient Education: Go to Patient Education Activity  Goal: Patient/Family Education  Outcome: Progressing Towards Goal     Problem: Suicide  Goal: *STG: Remains safe in hospital  Outcome: Progressing Towards Goal  Goal: *STG: Seeks staff when feelings of self harm or harm towards others arise  Outcome: Progressing Towards Goal  Goal: *STG: Attends activities and groups  Outcome: Progressing Towards Goal  Goal: *STG:  Verbalizes alternative ways of dealing with maladaptive feelings/behaviors  Outcome: Progressing Towards Goal  Goal: *STG/LTG: Complies with medication therapy  Outcome: Progressing Towards Goal  Goal: *STG/LTG: No longer expresses self destructive or suicidal thoughts  Outcome: Progressing Towards Goal  Goal: *LTG:  Identifies available community resources  Outcome: Progressing Towards Goal  Goal: *LTG:  Develops proactive suicide prevention plan  Outcome: Progressing Towards Goal  Goal: Interventions  Outcome: Progressing Towards Goal     Problem: Patient Education: Go to Patient Education Activity  Goal: Patient/Family Education  Outcome: Progressing Towards Goal     Problem: Patient Education: Go to Patient Education Activity  Goal: Patient/Family Education  Outcome: Progressing Towards Goal     Problem: Patient Education: Go to Patient Education Activity  Goal: Patient/Family Education  Outcome: Progressing Towards Goal

## 2023-02-12 NOTE — PROGRESS NOTES
Spiritual Care Assessment/Progress Note  [facility]      NAME: Curtis Muller      MRN: [id number]  AGE: 61 y.o.  SEX: female  Mandaeism Affiliation: Unknown   Language: English     2/12/2023     Total Time (in minutes): 19     Spiritual Assessment begun in [facility] through conversation with:         [x]Patient        [] Family    [] Friend(s)        Reason for Consult: Initial/Spiritual assessment, patient floor     Spiritual beliefs: (Please include comment if needed)     [] Identifies with a geoffrey tradition:         [] Supported by a geoffrey community:            [] Claims no spiritual orientation:           [] Seeking spiritual identity:                [x] Adheres to an individual form of spirituality:           [] Not able to assess:                           Identified resources for coping:      [x] Prayer                               [x] Music                  [] Guided Imagery     [] Family/friends                 [] Pet visits     [] Devotional reading                         [] Unknown     [] Other:                                                Interventions offered during this visit: (See comments for more details)    Patient Interventions: Affirmation of emotions/emotional suffering, Affirmation of geoffrey, Catharsis/review of pertinent events in supportive environment, Iconic (affirming the presence of God/Higher Power), Initial/Spiritual assessment, patient floor, Normalization of emotional/spiritual concerns, Prayer (assurance of)           Plan of Care:     [] Support spiritual and/or cultural needs    [] Support AMD and/or advance care planning process      [] Support grieving process   [] Coordinate Rites and/or Rituals    [] Coordination with community clergy   [] No spiritual needs identified at this time   [] Detailed Plan of Care below (See Comments)  [x] Make referral to Music Therapy  [] Make referral to Pet Therapy     [] Make referral to Addiction services  [] Make referral to [facility]  [] Make referral to Spiritual Care Partner  [] No future visits requested        [] Contact Spiritual Care for further referrals     Comments:  Reviewed chart prior to visit on [facility] unit for spiritual assessment. No family/friends present. Hospital sitter was in room. Ms Jacquelin Garza was seated on side of bed. She appeared tired and in fair spirits. She indicated she has not had visitors. When asked about spirituality, she stated she believes in God. She was receptive to assurance of prayer. No other spiritual concerns were expressed. Offered pastoral presence and listening support. Plan of care will include referral to music therapy.    available upon referral by staff or by patient/family request.       LYNDSAY Townsend, [facility], Staff   GIA FOSTER [facility] Paging Service  [phone])

## 2023-02-12 NOTE — PROGRESS NOTES
[facility]  Progress Note        NAME: Isaias Shaw       :  1965       MRN:  [id number]     Date/Time: 2023    Risk of deterioration: medium       Assessment:    Plan:  Acute on chronic hyponatremia-behaving like siadh  ETOH  HTN  Psych  Mucous plugging/sob Sodium marla 118  Sodium 124 today, repeat dose of tolvaptan  Avoid free water/hypotonic fluids  Electrolytes improved,replaced     records from [facility] reviewed-has been seen by nephrology there in past-working diagnosis etoh/increased water intake    CTA (-) PE  Water restriction-sodium worse with ns/didn't respond to urea na/salt tabs. Is responding to tolvaptan     Subjective:     Chief Complaint:  I'm not drinking any water    Review of Systems: no n/v/cp/f/c  (-) cough/sob      Objective:     VITALS:   Last 24hrs VS reviewed since prior progress note. Most recent are:  Visit Vitals  /71 (BP 1 Location: Left upper arm, BP Patient Position: Sitting)   Pulse (!) 117   Temp 98.9 °F (37.2 °C)   Resp 20   Ht 5' 3\" (1.6 m)   Wt 43.7 kg (96 lb 5.5 oz)   SpO2 90%   BMI 17.07 kg/m²     SpO2 Readings from Last 6 Encounters:   23 90%    O2 Flow Rate (L/min): 4 l/min     Intake/Output Summary (Last 24 hours) at 2023 0945  Last data filed at 2023 2351  Gross per 24 hour   Intake --   Output 1410 ml   Net -1410 ml          Telemetry Reviewed       PHYSICAL EXAM:    General   well developed, chronically ill appearing wf  EENT  Normocephalic, Atraumatic,  EOMI, sclera clear,  Respiratory   Clear To Auscultation bilaterally  Cardiology  Regular Rate and Rythmn    Abdominal  Soft, non-tender, non-distended, positive bowel sounds  Extremities  No clubbing, cyanosis, or edema. Pulses intact.               Lab Data Reviewed: (see below)    Medications Reviewed: (see below)    PMH/SH reviewed - no change compared to H&P________________    Attending Physician: Caryle Kick, MD ____________________________________________________  MEDICATIONS:  Current Facility-Administered Medications   Medication Dose Route Frequency    tolvaptan (SAMSCA) tablet (0.5 X 30 MG) 15 mg  15 mg Oral ONCE    enoxaparin (LOVENOX) injection 30 mg  30 mg SubCUTAneous DAILY    sodium chloride (OCEAN) 0.65 % nasal squeeze bottle 2 Spray  2 Spray Both Nostrils Q2H PRN    [Held by provider] sodium chloride soluble tablet 1,000 mg  1 g Oral BID    albuterol-ipratropium (DUO-NEB) 2.5 MG-0.5 MG/3 ML  3 mL Nebulization Q4H PRN    predniSONE (DELTASONE) tablet 40 mg  40 mg Oral DAILY WITH BREAKFAST    therapeutic multivitamin (THERAGRAN) tablet 1 Tablet  1 Tablet Oral DAILY    folic acid (FOLVITE) tablet 1 mg  1 mg Oral DAILY    thiamine mononitrate (B-1) tablet 100 mg  100 mg Oral DAILY    sodium chloride (NS) flush 5-40 mL  5-40 mL IntraVENous Q8H    sodium chloride (NS) flush 5-40 mL  5-40 mL IntraVENous PRN    acetaminophen (TYLENOL) tablet 650 mg  650 mg Oral Q6H PRN    Or    acetaminophen (TYLENOL) suppository 650 mg  650 mg Rectal Q6H PRN    polyethylene glycol (MIRALAX) packet 17 g  17 g Oral DAILY PRN    ondansetron (ZOFRAN ODT) tablet 4 mg  4 mg Oral Q8H PRN    Or    ondansetron (ZOFRAN) injection 4 mg  4 mg IntraVENous Q6H PRN    ipratropium (ATROVENT) 0.02 % nebulizer solution 0.5 mg  0.5 mg Nebulization Q4H PRN    levalbuterol (XOPENEX) nebulizer soln 1.25 mg/3 mL  1.25 mg Nebulization Q4H PRN    LORazepam (ATIVAN) injection 1 mg  1 mg IntraVENous Q2H PRN    LORazepam (ATIVAN) injection 2 mg  2 mg IntraVENous Q2H PRN    nicotine (NICODERM CQ) 21 mg/24 hr patch 1 Patch  1 Patch TransDERmal DAILY    balsam peru-castor oiL (VENELEX) ointment   Topical BID        LABS:  Recent Labs     02/12/23 0159   WBC 4.1   HGB 13.4   HCT 39.5          Recent Labs     02/12/23  0159 02/11/23  1418 02/11/23  0147 02/10/23  0530   * 120* 121* 123*   K 3.7 4.3 2.9* 3.7   CL 83* 78* 81* 83*   CO2 36* 35* 32 36* BUN 20 42* 12 7   CREA 0.37* 0.46* 0.38* 0.32*   GLU 98 131* 121* 99   CA 9.2 8.3* 8.0* 8.4*   MG 1.7  --   --  1.6   PHOS 4.1 4.2 2.4* 2.0*       Recent Labs     02/10/23  0530   ALT 19   AP 83   TBILI 0.6   TP 6.1*   ALB 2.7*   GLOB 3.4       No results for input(s): INR, PTP, APTT, INREXT, INREXT in the last 72 hours. No results for input(s): FE, TIBC, PSAT, FERR in the last 72 hours. No results for input(s): PH, PCO2, PO2 in the last 72 hours. No results for input(s): CPK, CKNDX, TROIQ in the last 72 hours.     No lab exists for component: CPKMB  No results found for: Triston Inman

## 2023-02-13 LAB
ANION GAP SERPL CALC-SCNC: 4 MMOL/L (ref 5–15)
BASOPHILS # BLD: 0 K/UL (ref 0–0.1)
BASOPHILS NFR BLD: 0 % (ref 0–1)
BUN SERPL-MCNC: 11 MG/DL (ref 6–20)
BUN/CREAT SERPL: 34 (ref 12–20)
CALCIUM SERPL-MCNC: 8.7 MG/DL (ref 8.5–10.1)
CHLORIDE SERPL-SCNC: 87 MMOL/L (ref 97–108)
CO2 SERPL-SCNC: 37 MMOL/L (ref 21–32)
CREAT SERPL-MCNC: 0.32 MG/DL (ref 0.55–1.02)
DIFFERENTIAL METHOD BLD: ABNORMAL
EOSINOPHIL # BLD: 0 K/UL (ref 0–0.4)
EOSINOPHIL NFR BLD: 1 % (ref 0–7)
ERYTHROCYTE [DISTWIDTH] IN BLOOD BY AUTOMATED COUNT: 14 % (ref 11.5–14.5)
GLUCOSE SERPL-MCNC: 95 MG/DL (ref 65–100)
HCT VFR BLD AUTO: 35.1 % (ref 35–47)
HGB BLD-MCNC: 11.4 G/DL (ref 11.5–16)
IMM GRANULOCYTES # BLD AUTO: 0 K/UL (ref 0–0.04)
IMM GRANULOCYTES NFR BLD AUTO: 0 % (ref 0–0.5)
LYMPHOCYTES # BLD: 1.4 K/UL (ref 0.8–3.5)
LYMPHOCYTES NFR BLD: 20 % (ref 12–49)
MAGNESIUM SERPL-MCNC: 1.7 MG/DL (ref 1.6–2.4)
MCH RBC QN AUTO: 32 PG (ref 26–34)
MCHC RBC AUTO-ENTMCNC: 32.5 G/DL (ref 30–36.5)
MCV RBC AUTO: 98.6 FL (ref 80–99)
MONOCYTES # BLD: 1.1 K/UL (ref 0–1)
MONOCYTES NFR BLD: 15 % (ref 5–13)
NEUTS SEG # BLD: 4.5 K/UL (ref 1.8–8)
NEUTS SEG NFR BLD: 64 % (ref 32–75)
NRBC # BLD: 0 K/UL (ref 0–0.01)
NRBC BLD-RTO: 0 PER 100 WBC
PHOSPHATE SERPL-MCNC: 2.7 MG/DL (ref 2.6–4.7)
PLATELET # BLD AUTO: 194 K/UL (ref 150–400)
PMV BLD AUTO: 9.6 FL (ref 8.9–12.9)
POTASSIUM SERPL-SCNC: 4.2 MMOL/L (ref 3.5–5.1)
RBC # BLD AUTO: 3.56 M/UL (ref 3.8–5.2)
SODIUM SERPL-SCNC: 122 MMOL/L (ref 136–145)
SODIUM SERPL-SCNC: 125 MMOL/L (ref 136–145)
SODIUM SERPL-SCNC: 128 MMOL/L (ref 136–145)
SODIUM SERPL-SCNC: 129 MMOL/L (ref 136–145)
WBC # BLD AUTO: 7.1 K/UL (ref 3.6–11)

## 2023-02-13 PROCEDURE — 36415 COLL VENOUS BLD VENIPUNCTURE: CPT

## 2023-02-13 PROCEDURE — 83735 ASSAY OF MAGNESIUM: CPT

## 2023-02-13 PROCEDURE — 74011250637 HC RX REV CODE- 250/637: Performed by: NURSE PRACTITIONER

## 2023-02-13 PROCEDURE — 74011250637 HC RX REV CODE- 250/637: Performed by: INTERNAL MEDICINE

## 2023-02-13 PROCEDURE — 84295 ASSAY OF SERUM SODIUM: CPT

## 2023-02-13 PROCEDURE — 74011636637 HC RX REV CODE- 636/637: Performed by: INTERNAL MEDICINE

## 2023-02-13 PROCEDURE — 74011000250 HC RX REV CODE- 250: Performed by: NURSE PRACTITIONER

## 2023-02-13 PROCEDURE — 74011250636 HC RX REV CODE- 250/636: Performed by: INTERNAL MEDICINE

## 2023-02-13 PROCEDURE — 80048 BASIC METABOLIC PNL TOTAL CA: CPT

## 2023-02-13 PROCEDURE — 94640 AIRWAY INHALATION TREATMENT: CPT

## 2023-02-13 PROCEDURE — 77010033678 HC OXYGEN DAILY

## 2023-02-13 PROCEDURE — 74011250636 HC RX REV CODE- 250/636: Performed by: NURSE PRACTITIONER

## 2023-02-13 PROCEDURE — 65270000046 HC RM TELEMETRY

## 2023-02-13 PROCEDURE — 85025 COMPLETE CBC W/AUTO DIFF WBC: CPT

## 2023-02-13 PROCEDURE — 74011000250 HC RX REV CODE- 250: Performed by: INTERNAL MEDICINE

## 2023-02-13 PROCEDURE — 84100 ASSAY OF PHOSPHORUS: CPT

## 2023-02-13 RX ADMIN — ACETAMINOPHEN 325MG 650 MG: 325 TABLET ORAL at 13:07

## 2023-02-13 RX ADMIN — FOLIC ACID 1 MG: 1 TABLET ORAL at 08:39

## 2023-02-13 RX ADMIN — SODIUM CHLORIDE, PRESERVATIVE FREE 10 ML: 5 INJECTION INTRAVENOUS at 21:47

## 2023-02-13 RX ADMIN — THIAMINE HCL TAB 100 MG 100 MG: 100 TAB at 08:39

## 2023-02-13 RX ADMIN — ENOXAPARIN SODIUM 30 MG: 100 INJECTION SUBCUTANEOUS at 08:39

## 2023-02-13 RX ADMIN — IPRATROPIUM BROMIDE AND ALBUTEROL SULFATE 3 ML: 2.5; .5 SOLUTION RESPIRATORY (INHALATION) at 12:11

## 2023-02-13 RX ADMIN — LORAZEPAM 1 MG: 2 INJECTION INTRAMUSCULAR at 01:11

## 2023-02-13 RX ADMIN — THERA TABS 1 TABLET: TAB at 08:39

## 2023-02-13 RX ADMIN — LORAZEPAM 1 MG: 2 INJECTION INTRAMUSCULAR at 08:52

## 2023-02-13 RX ADMIN — CASTOR OIL AND BALSAM, PERU: 788; 87 OINTMENT TOPICAL at 20:26

## 2023-02-13 RX ADMIN — SODIUM CHLORIDE, PRESERVATIVE FREE 10 ML: 5 INJECTION INTRAVENOUS at 17:32

## 2023-02-13 RX ADMIN — IPRATROPIUM BROMIDE AND ALBUTEROL SULFATE 3 ML: 2.5; .5 SOLUTION RESPIRATORY (INHALATION) at 04:28

## 2023-02-13 RX ADMIN — METOPROLOL TARTRATE 25 MG: 25 TABLET, FILM COATED ORAL at 08:39

## 2023-02-13 RX ADMIN — METOPROLOL TARTRATE 25 MG: 25 TABLET, FILM COATED ORAL at 20:28

## 2023-02-13 RX ADMIN — SODIUM CHLORIDE, PRESERVATIVE FREE 10 ML: 5 INJECTION INTRAVENOUS at 08:53

## 2023-02-13 RX ADMIN — CASTOR OIL AND BALSAM, PERU: 788; 87 OINTMENT TOPICAL at 08:42

## 2023-02-13 RX ADMIN — Medication 15 MG: at 20:25

## 2023-02-13 RX ADMIN — ACETAMINOPHEN 325MG 650 MG: 325 TABLET ORAL at 23:53

## 2023-02-13 RX ADMIN — PREDNISONE 40 MG: 20 TABLET ORAL at 08:39

## 2023-02-13 NOTE — PROGRESS NOTES
Occupational Therapy note:    Chart reviewed and attempted to see patient for OT tx session. Patient received sleeping in bed and unable to keep eyes open during conversation. Per RN, patient just received ativan. Patient unable to participate in therapy session at this time. Will defer and continue to follow.     Paradise Fall, OTR/L

## 2023-02-13 NOTE — BSMART NOTE
BSMART Liaison Team Note     LOS:  6 days      Patient goal(s) for today: communicate needs to staff in an appropriate manner, take medication as prescribed   BSMART Liaison team focus/goals: assess needs, provide education and support     Progress note: Pt was asleep with RN at bedside. Pt was able to wake up to this writer and RN. Pt  reports feeling tired because she is not sleeping well and is unsure why. Denied concerns with appetite. Pt denied SI/HI or [facility] AT [facility]. Pt denies feeling depressed or anxious. When this writer asked pt about current feelings of depression, pt stated, \"not that I know of.\" She is not interested in therapy today or services. Pt was alert and oriented x4 and cooperative. Barriers to Discharge: medical clearance     Outpatient provider(s):  none  Insurance info/prescription coverage:  not on file    Diagnosis: Adjustment Disorder, Alcohol use disorder. Plan:  please defer to psychiatric provider for disposition and recommendation.    Follow up Psych Consult placed? no.   Psychiatrist updated? no       Participating treatment team members: Meagan Yap, ABIGAIL, Supervisee in Social Work

## 2023-02-13 NOTE — PROGRESS NOTES
Chart reviewed and attempted to see pt. She had received ativan and too lethargic to participate in therapy. Will defer and continue to follow.

## 2023-02-13 NOTE — PROGRESS NOTES
36 AM Received TORB from Dr. Laurita Ulloa for repeat consult to psychiatry for today. Primary RN, Jaqueline Wren, updated.

## 2023-02-13 NOTE — PROGRESS NOTES
Problem: Pressure Injury - Risk of  Goal: *Prevention of pressure injury  Description: Document Kelvin Scale and appropriate interventions in the flowsheet. Outcome: Progressing Towards Goal  Note: Pressure Injury Interventions:  Sensory Interventions: Assess changes in LOC    Moisture Interventions: Absorbent underpads, Apply protective barrier, creams and emollients    Activity Interventions: Increase time out of bed, Pressure redistribution bed/mattress(bed type)    Mobility Interventions: HOB 30 degrees or less, Pressure redistribution bed/mattress (bed type)    Nutrition Interventions: Document food/fluid/supplement intake    Friction and Shear Interventions: Apply protective barrier, creams and emollients, Minimize layers                Problem: Falls - Risk of  Goal: *Absence of Falls  Description: Document Caitlin Fall Risk and appropriate interventions in the flowsheet.   Outcome: Progressing Towards Goal  Note: Fall Risk Interventions:            Medication Interventions: Bed/chair exit alarm                   Problem: Breathing Pattern - Ineffective  Goal: *Absence of hypoxia  Outcome: Progressing Towards Goal  Goal: *Use of effective breathing techniques  Outcome: Progressing Towards Goal  Goal: *PALLIATIVE CARE:  Alleviation of Dyspnea  Outcome: Progressing Towards Goal     Problem: Suicide  Goal: *STG: Remains safe in hospital  Outcome: Progressing Towards Goal  Goal: *STG: Seeks staff when feelings of self harm or harm towards others arise  Outcome: Progressing Towards Goal  Goal: *STG: Attends activities and groups  Outcome: Progressing Towards Goal  Goal: *STG:  Verbalizes alternative ways of dealing with maladaptive feelings/behaviors  Outcome: Progressing Towards Goal  Goal: *STG/LTG: Complies with medication therapy  Outcome: Progressing Towards Goal  Goal: *STG/LTG: No longer expresses self destructive or suicidal thoughts  Outcome: Progressing Towards Goal  Goal: *LTG:  Identifies available community resources  Outcome: Progressing Towards Goal  Goal: *LTG:  Develops proactive suicide prevention plan  Outcome: Progressing Towards Goal  Goal: Interventions  Outcome: Progressing Towards Goal

## 2023-02-13 NOTE — PROGRESS NOTES
[facility]  Progress Note        NAME: Panda Abdi       :  1965       MRN:  [id number]     Date/Time: 2023    Risk of deterioration: medium       Assessment:    Plan:  Acute on chronic hyponatremia-behaving like siadh  ETOH  HTN  Psych  Mucous plugging/sob Sodium has been improving with tolvaptan. There are 2 values for sodium this AM.  One is 128, the other is 125. Q6 sodium is ordered. I will wait to see what the next sodium is prior to further tolvaptan. Records from [facility] reviewed-has been seen by nephrology there in past-working diagnosis etoh/increased water intake    CTA (-) PE    ADDENDUM: repeat sodium down to 122. I will give tolvaptan. Subjective:     Chief Complaint:  Sleepy. Arousable. No complaint. Sitter at bedside. Objective:     VITALS:   Last 24hrs VS reviewed since prior progress note. Most recent are:  Visit Vitals  /67   Pulse (!) 103   Temp 98.4 °F (36.9 °C)   Resp 20   Ht 5' 3\" (1.6 m)   Wt 43.7 kg (96 lb 5.5 oz)   SpO2 96%   BMI 17.07 kg/m²     SpO2 Readings from Last 6 Encounters:   23 96%    O2 Flow Rate (L/min): 3 l/min     Intake/Output Summary (Last 24 hours) at 2023 1045  Last data filed at 2023 2255  Gross per 24 hour   Intake --   Output 400 ml   Net -400 ml          Telemetry Reviewed       PHYSICAL EXAM:    General   well developed, chronically ill appearing wf  EENT  Normocephalic, Atraumatic,  EOMI, sclera clear,  Respiratory   Clear To Auscultation bilaterally  Cardiology  Regular Rate and Rythmn    Abdominal  Soft, non-tender, non-distended, positive bowel sounds  Extremities  No clubbing, cyanosis, or edema. Pulses intact.               Lab Data Reviewed: (see below)    Medications Reviewed: (see below)    PMH/SH reviewed - no change compared to H&P________________    Attending Physician: Norris Ha MD     ____________________________________________________  MEDICATIONS:  Current Facility-Administered Medications   Medication Dose Route Frequency    tolvaptan (SAMSCA) tablet (0.5 X 30 MG) 15 mg  15 mg Oral ONCE    metoprolol tartrate (LOPRESSOR) tablet 25 mg  25 mg Oral Q12H    Tolvaptan MAR Reminder Note  1 Each Other Q6H    enoxaparin (LOVENOX) injection 30 mg  30 mg SubCUTAneous DAILY    sodium chloride (OCEAN) 0.65 % nasal squeeze bottle 2 Spray  2 Spray Both Nostrils Q2H PRN    [Held by provider] sodium chloride soluble tablet 1,000 mg  1 g Oral BID    albuterol-ipratropium (DUO-NEB) 2.5 MG-0.5 MG/3 ML  3 mL Nebulization Q4H PRN    predniSONE (DELTASONE) tablet 40 mg  40 mg Oral DAILY WITH BREAKFAST    therapeutic multivitamin (THERAGRAN) tablet 1 Tablet  1 Tablet Oral DAILY    folic acid (FOLVITE) tablet 1 mg  1 mg Oral DAILY    thiamine mononitrate (B-1) tablet 100 mg  100 mg Oral DAILY    sodium chloride (NS) flush 5-40 mL  5-40 mL IntraVENous Q8H    sodium chloride (NS) flush 5-40 mL  5-40 mL IntraVENous PRN    acetaminophen (TYLENOL) tablet 650 mg  650 mg Oral Q6H PRN    Or    acetaminophen (TYLENOL) suppository 650 mg  650 mg Rectal Q6H PRN    polyethylene glycol (MIRALAX) packet 17 g  17 g Oral DAILY PRN    ondansetron (ZOFRAN ODT) tablet 4 mg  4 mg Oral Q8H PRN    Or    ondansetron (ZOFRAN) injection 4 mg  4 mg IntraVENous Q6H PRN    ipratropium (ATROVENT) 0.02 % nebulizer solution 0.5 mg  0.5 mg Nebulization Q4H PRN    levalbuterol (XOPENEX) nebulizer soln 1.25 mg/3 mL  1.25 mg Nebulization Q4H PRN    LORazepam (ATIVAN) injection 1 mg  1 mg IntraVENous Q2H PRN    LORazepam (ATIVAN) injection 2 mg  2 mg IntraVENous Q2H PRN    nicotine (NICODERM CQ) 21 mg/24 hr patch 1 Patch  1 Patch TransDERmal DAILY    balsam peru-castor oiL (VENELEX) ointment   Topical BID        LABS:  Recent Labs     02/13/23  0310 02/12/23  0159   WBC 7.1 4.1   HGB 11.4* 13.4   HCT 35.1 39.5    185       Recent Labs     02/13/23  0900 02/13/23  0310 02/12/23  2128 02/12/23  0159 02/11/23  1418   * 128* 129* 124* 120*   K  --  4.2  --  3.7 4.3   CL  --  87*  --  83* 78*   CO2  --  37*  --  36* 35*   BUN  --  11  --  20 42*   CREA  --  0.32*  --  0.37* 0.46*   GLU  --  95  --  98 131*   CA  --  8.7  --  9.2 8.3*   MG  --  1.7  --  1.7  --    PHOS  --  2.7  --  4.1 4.2       No results for input(s): ALT, AP, TBIL, TBILI, TP, ALB, GLOB, GGT, AML, LPSE in the last 72 hours. No lab exists for component: SGOT, GPT, AMYP, HLPSE    No results for input(s): INR, PTP, APTT, INREXT, INREXT in the last 72 hours. No results for input(s): FE, TIBC, PSAT, FERR in the last 72 hours. No results for input(s): PH, PCO2, PO2 in the last 72 hours. No results for input(s): CPK, CKNDX, TROIQ in the last 72 hours.     No lab exists for component: CPKMB  No results found for: [facility]

## 2023-02-13 NOTE — CONSULTS
PSYCHIATRY CONSULT NOTE:    REASON FOR CONSULT:  ETOH withdrawal  substance abuse    HISTORY OF PRESENTING COMPLAINT:  Anette Giang is a 61 y.o. female admitted to unit due to ETOH withdrawal and for detox and hyponatremia. She is A&Ox4 with \"alright\" mood and slightly restricted and sad affect. She is pleasant and cooperative with interview but appears surprised that she is speaking with a psychiatric provider. Thought process is LLGD, Thought content is negative for SI/HI and no delusions noted. Sleep is reported as poor but not concerning for a bipolar spectrum or major depressive disorder. Appetite is endorsed as adequate       PAST PSYCHIATRIC HISTORY:  None    SUBSTANCE ABUSE HISTORY:  Social History     Substance and Sexual Activity   Drug Use Not on file     Social History     Substance and Sexual Activity   Alcohol Use Not on file     Social History     Tobacco Use   Smoking Status Not on file   Smokeless Tobacco Not on file       PAST MEDICAL HISTORY:  No past medical history on file.     SOCIAL HISTORY:  Was living with mother in house, reportedly house may have been auctioned so potentially now homeless    VITALS:  Visit Vitals  /65   Pulse (!) 107   Temp 98.7 °F (37.1 °C)   Resp 22   Ht 5' 3\" (1.6 m)   Wt 43.7 kg (96 lb 5.5 oz)   SpO2 100%   BMI 17.07 kg/m²       MEDICATIONS:    Current Facility-Administered Medications:     tolvaptan (SAMSCA) tablet (0.5 X 30 MG) 15 mg, 15 mg, Oral, ONCE, Jessenia Franlkin MD    Tolvaptan MAR Reminder Note, 1 Each, Other, Q6H, Daniel Franklin Form III, MD    metoprolol tartrate (LOPRESSOR) tablet 25 mg, 25 mg, Oral, Q12H, Evan Fabian MD, 25 mg at 02/13/23 0839    enoxaparin (LOVENOX) injection 30 mg, 30 mg, SubCUTAneous, DAILY, Evan Fabian MD, 30 mg at 02/13/23 0839    sodium chloride (OCEAN) 0.65 % nasal squeeze bottle 2 Spray, 2 Spray, Both Nostrils, Q2H PRN, Trupti Medley MD, 2 [place] at 02/10/23 1940    [Held by provider] sodium chloride soluble tablet 1,000 mg, 1 g, Oral, BID, Ruth Oliver MD, 1,000 mg at 02/09/23 1816    albuterol-ipratropium (DUO-NEB) 2.5 MG-0.5 MG/3 ML, 3 mL, Nebulization, Q4H PRN, Marcos Paris MD, 3 mL at 02/13/23 1211    predniSONE (DELTASONE) tablet 40 mg, 40 mg, Oral, DAILY WITH BREAKFAST, Marcos Paris MD, 40 mg at 02/13/23 8421    therapeutic multivitamin (THERAGRAN) tablet 1 Tablet, 1 Tablet, Oral, DAILY, Marcos Paris MD, 1 Tablet at 47/00/27 1809    folic acid (FOLVITE) tablet 1 mg, 1 mg, Oral, DAILY, Marcos Paris MD, 1 mg at 02/13/23 0839    thiamine mononitrate (B-1) tablet 100 mg, 100 mg, Oral, DAILY, Marcos Paris MD, 100 mg at 02/13/23 0839    sodium chloride (NS) flush 5-40 mL, 5-40 mL, IntraVENous, Q8H, Salabao, Mary, NP, 10 mL at 02/13/23 1732    sodium chloride (NS) flush 5-40 mL, 5-40 mL, IntraVENous, PRN, Leslie Safe, Mary, NP    acetaminophen (TYLENOL) tablet 650 mg, 650 mg, Oral, Q6H PRN, 650 mg at 02/13/23 1307 **OR** acetaminophen (TYLENOL) suppository 650 mg, 650 mg, Rectal, Q6H PRN, Salabao, Mary, NP    polyethylene glycol (MIRALAX) packet 17 g, 17 g, Oral, DAILY PRN, Salabao, Mary, NP    ondansetron (ZOFRAN ODT) tablet 4 mg, 4 mg, Oral, Q8H PRN **OR** ondansetron (ZOFRAN) injection 4 mg, 4 mg, IntraVENous, Q6H PRN, Salabao, Mary, NP    ipratropium (ATROVENT) 0.02 % nebulizer solution 0.5 mg, 0.5 mg, Nebulization, Q4H PRN, Salabao, Mary, NP, 0.5 mg at 02/12/23 0742    levalbuterol (XOPENEX) nebulizer soln 1.25 mg/3 mL, 1.25 mg, Nebulization, Q4H PRN, Salabao, Mary, NP, 1.25 mg at 02/12/23 0742    LORazepam (ATIVAN) injection 1 mg, 1 mg, IntraVENous, Q2H PRN, Salabao, Mary, NP, 1 mg at 02/13/23 0852    LORazepam (ATIVAN) injection 2 mg, 2 mg, IntraVENous, Q2H PRN, Salabao, Mary, NP, 2 mg at 02/12/23 1336    nicotine (NICODERM CQ) 21 mg/24 hr patch 1 Patch, 1 Patch, TransDERmal, DAILY, Salabao, Mary, NP, 1 Patch at 02/13/23 0838    balsam peru-castor oiL (VENELEX) ointment, , Topical, BID, Rich Luna MD, Given at 02/13/23 5879    MENTAL STATUS EXAM:  A&Ox4  Appearance: older than age appearing female, dressed in hospital scrubs, slightly disheveled  Behavior: cooperative, open, pleasant  Eye contact: good  Mood: \"alright\"  Affect: slightly restricted and sad  Speech: slightly slower than average rate but otherwise unremarkable  Thought process: LLGD  Thought content: Denies SI/HI.  No delusions  Perception: Denies AVTH  Memory: adequate  Attention: adequate  Insight: fair  Judgement: limited due to possible ETOH abuse in community    ASSESSMENT AND PLAN:  She meets criteria for a unspecified alcohol use disorder per reported history       RECOMMENDATIONS:  Offer voluntary psychiatric admission to address ETOH misuse however patient would not meet criteria for a TDO and involuntary admission  If declines voluntary admission, disposition per social work   Thank you for this consultation    Zainab Alas NP  2/13/2023

## 2023-02-13 NOTE — PROGRESS NOTES
Hospitalist Progress Note    NAME: Nimesh Rangel   :  1965   MRN:  [id number]       Assessment / Plan:  Acute hypoxic respiratory failure, POA  COPD exacerbation, POA  Multiple rib fractures, POA  - EKG on admission with no acute ischemic changes  - proBNP around 1600  - On oxygen 3 L via nasal cannula, wean O2 as able  - CTA with no PE. Acute right ninth rib fracture, multiple bilateral remote rib fractures  - Spirometry as tolerated  - Add DuoNeb, arformoterol/budesonide, DC prednisone, has received 5 day course. - Wean off oxygen as tolerated, likely she will need oxygen on discharge  - Oxygen challenge before discharge  - PT OT eval       Acute on chronic hyponatremia, POA  - Presented with sodium 118, baseline around 126  - sodium has been fluctuating, 128 today  - Nephrology input is appreciated  - FR  - Patient receiving tolvaptan  - Follow BMP very closely  - Etiology can be related for alcohol abuse and possibly excess water intake although patient is denying    Suicidal ideation    - Suicide precautions, constant observation  - Psychiatrist evaluated patient and felt the diagnosis is adjustment disorder, recommendation to discharge home and follow-up as an outpatient    Alcohol abuse  - Patient reported that she drinks on daily basis  - Watch for alcohol withdrawal syndrome    Sinus tachycardia  Introduce a BB    Nicotine use  - Counseling was provided about smoking cessation  - Nicotine patch    Incidental finding on CTA chest    Compression of multiple vertebral bodies. Partial atelectasis/scarring in the right middle lobe. Partially opacified bronchioles supplying the posterior aspect of the left  lower lobe suggesting mucus plugging and/or bronchitis. Will need close follow-up as an outpatient    18.5 - 24.9 Normal weight / Body mass index is 17.07 kg/m².     Estimated discharge date: February 15  Barriers: PT OT, oxygen challenge, sodium improvement    Code status: Full  Prophylaxis: Lovenox  Recommended Disposition:  TBD     Subjective:     Patient was seen and examined. No acute events overnight. Discussed with RN overnight events. Patient seen and examined. She says her breathing has been better and it has been worse. Wants to know if she'll be able to work with therapy today or not. Review of Systems:  Symptom Y/N Comments  Symptom Y/N Comments   Fever/Chills n   Chest Pain y Pleuritic   Poor Appetite    Edema     Cough n   Abdominal Pain n    Sputum    Joint Pain     SOB/JARAMILLO n   Pruritis/Rash     Nausea/vomit n   Tolerating PT/OT     Diarrhea    Tolerating Diet y    Constipation    Other       Could NOT obtain due to:          Objective:     VITALS:   Last 24hrs VS reviewed since prior progress note. Most recent are:  Patient Vitals for the past 24 hrs:   Temp Pulse Resp BP SpO2   02/13/23 0756 98.4 °F (36.9 °C) (!) 103 20 113/67 96 %   02/13/23 0508 -- -- -- -- 95 %   02/13/23 0429 -- -- -- -- 98 %   02/13/23 0246 98.6 °F (37 °C) 98 22 106/60 99 %   02/12/23 2255 98.4 °F (36.9 °C) 92 20 117/66 98 %   02/12/23 2230 -- -- -- -- 99 %   02/12/23 2125 -- (!) 107 -- -- --   02/12/23 1904 97.2 °F (36.2 °C) 100 17 135/86 --   02/12/23 1536 98.5 °F (36.9 °C) 87 18 (!) 93/52 100 %   02/12/23 1535 -- 87 -- -- 100 %   02/12/23 1200 99.8 °F (37.7 °C) (!) 127 18 127/75 98 %         Intake/Output Summary (Last 24 hours) at 2/13/2023 0656  Last data filed at 2/12/2023 2255  Gross per 24 hour   Intake --   Output 400 ml   Net -400 ml          I had a face to face encounter and independently examined this patient on 2/13/2023, as outlined below:  PHYSICAL EXAM:  General: WD, WN. Alert, cooperative, no acute distress    EENT:  EOMI. Anicteric sclerae. MMM  Resp:  bilaterally, +ve wheezing, no rales. No accessory muscle use  CV:  Regular  rhythm,  No edema  GI:  Soft, Non distended, Non tender. +Bowel sounds  Neurologic:  EOMs intact. No facial asymmetry.  No aphasia or slurred speech. Symmetrical strength, Sensation grossly intact. Alert and oriented X 4.     Psych:   Good insight. Not anxious nor agitated  Skin:  No rashes. No jaundice    Reviewed most current lab test results and cultures  YES  Reviewed most current radiology test results   YES  Review and summation of old records today    NO  Reviewed patient's current orders and MAR    YES  PMH/SH reviewed - no change compared to H&P  ________________________________________________________________________  Care Plan discussed with:    Comments   Patient X    Family      RN X    Care Manager     Consultant                        Multidiciplinary team rounds were held today with , nursing, pharmacist and clinical coordinator. Patient's plan of care was discussed; medications were reviewed and discharge planning was addressed. ________________________________________________________________________        Comments   >50% of visit spent in counseling and coordination of care X    ________________________________________________________________________  Makenzie Vázquez MD     Procedures: see electronic medical records for all procedures/Xrays and details which were not copied into this note but were reviewed prior to creation of Plan. LABS:  I reviewed today's most current labs and imaging studies.   Pertinent labs include:  Recent Labs     02/13/23  0310 02/12/23  0159   WBC 7.1 4.1   HGB 11.4* 13.4   HCT 35.1 39.5    185       Recent Labs     02/13/23  0310 02/12/23  2128 02/12/23  0159 02/11/23  1418   * 129* 124* 120*   K 4.2  --  3.7 4.3   CL 87*  --  83* 78*   CO2 37*  --  36* 35*   GLU 95  --  98 131*   BUN 11  --  20 42*   CREA 0.32*  --  0.37* 0.46*   CA 8.7  --  9.2 8.3*   MG 1.7  --  1.7  --    PHOS 2.7  --  4.1 4.2         Signed: Makenzie Vázquez MD

## 2023-02-13 NOTE — PROGRESS NOTES
End of Shift Note    Bedside shift change report given to RN (oncoming nurse) by Glenn Mckeon RN (offgoing nurse).   Report included the following information SBAR, Kardex, ED Summary, Intake/Output, MAR, and Recent Results    Shift worked:  night     Shift summary and any significant changes:     PRN ativan given x1 for CIWA protocol, next serum sodium check due at 0900     Remains on 3L O2 NC, continue to wean as tolerated     Concerns for physician to address:  none     Zone phone for oncoming shift:   xxx         Glenn Mckeon RN

## 2023-02-14 LAB
ANION GAP SERPL CALC-SCNC: 5 MMOL/L (ref 5–15)
BASOPHILS # BLD: 0 K/UL (ref 0–0.1)
BASOPHILS NFR BLD: 0 % (ref 0–1)
BUN SERPL-MCNC: 13 MG/DL (ref 6–20)
BUN/CREAT SERPL: 41 (ref 12–20)
CALCIUM SERPL-MCNC: 8.7 MG/DL (ref 8.5–10.1)
CHLORIDE SERPL-SCNC: 86 MMOL/L (ref 97–108)
CO2 SERPL-SCNC: 35 MMOL/L (ref 21–32)
CREAT SERPL-MCNC: 0.32 MG/DL (ref 0.55–1.02)
DIFFERENTIAL METHOD BLD: ABNORMAL
EOSINOPHIL # BLD: 0.1 K/UL (ref 0–0.4)
EOSINOPHIL NFR BLD: 1 % (ref 0–7)
ERYTHROCYTE [DISTWIDTH] IN BLOOD BY AUTOMATED COUNT: 14 % (ref 11.5–14.5)
GLUCOSE SERPL-MCNC: 113 MG/DL (ref 65–100)
HCT VFR BLD AUTO: 33.5 % (ref 35–47)
HGB BLD-MCNC: 10.9 G/DL (ref 11.5–16)
IMM GRANULOCYTES # BLD AUTO: 0 K/UL (ref 0–0.04)
IMM GRANULOCYTES NFR BLD AUTO: 1 % (ref 0–0.5)
LYMPHOCYTES # BLD: 1.4 K/UL (ref 0.8–3.5)
LYMPHOCYTES NFR BLD: 21 % (ref 12–49)
MAGNESIUM SERPL-MCNC: 1.7 MG/DL (ref 1.6–2.4)
MCH RBC QN AUTO: 32.1 PG (ref 26–34)
MCHC RBC AUTO-ENTMCNC: 32.5 G/DL (ref 30–36.5)
MCV RBC AUTO: 98.5 FL (ref 80–99)
MONOCYTES # BLD: 1.2 K/UL (ref 0–1)
MONOCYTES NFR BLD: 19 % (ref 5–13)
NEUTS SEG # BLD: 3.9 K/UL (ref 1.8–8)
NEUTS SEG NFR BLD: 58 % (ref 32–75)
NRBC # BLD: 0 K/UL (ref 0–0.01)
NRBC BLD-RTO: 0 PER 100 WBC
PHOSPHATE SERPL-MCNC: 2.6 MG/DL (ref 2.6–4.7)
PLATELET # BLD AUTO: 222 K/UL (ref 150–400)
PMV BLD AUTO: 9.8 FL (ref 8.9–12.9)
POTASSIUM SERPL-SCNC: 4 MMOL/L (ref 3.5–5.1)
RBC # BLD AUTO: 3.4 M/UL (ref 3.8–5.2)
SODIUM SERPL-SCNC: 124 MMOL/L (ref 136–145)
SODIUM SERPL-SCNC: 126 MMOL/L (ref 136–145)
SODIUM SERPL-SCNC: 130 MMOL/L (ref 136–145)
WBC # BLD AUTO: 6.7 K/UL (ref 3.6–11)

## 2023-02-14 PROCEDURE — 74011250637 HC RX REV CODE- 250/637: Performed by: NURSE PRACTITIONER

## 2023-02-14 PROCEDURE — 74011000250 HC RX REV CODE- 250: Performed by: INTERNAL MEDICINE

## 2023-02-14 PROCEDURE — 84100 ASSAY OF PHOSPHORUS: CPT

## 2023-02-14 PROCEDURE — 97530 THERAPEUTIC ACTIVITIES: CPT

## 2023-02-14 PROCEDURE — 84295 ASSAY OF SERUM SODIUM: CPT

## 2023-02-14 PROCEDURE — 85025 COMPLETE CBC W/AUTO DIFF WBC: CPT

## 2023-02-14 PROCEDURE — 74011250637 HC RX REV CODE- 250/637: Performed by: INTERNAL MEDICINE

## 2023-02-14 PROCEDURE — 74011250636 HC RX REV CODE- 250/636: Performed by: INTERNAL MEDICINE

## 2023-02-14 PROCEDURE — 65270000046 HC RM TELEMETRY

## 2023-02-14 PROCEDURE — 80048 BASIC METABOLIC PNL TOTAL CA: CPT

## 2023-02-14 PROCEDURE — 77010033678 HC OXYGEN DAILY

## 2023-02-14 PROCEDURE — 74011000250 HC RX REV CODE- 250: Performed by: NURSE PRACTITIONER

## 2023-02-14 PROCEDURE — 74011636637 HC RX REV CODE- 636/637: Performed by: INTERNAL MEDICINE

## 2023-02-14 PROCEDURE — 94640 AIRWAY INHALATION TREATMENT: CPT

## 2023-02-14 PROCEDURE — 83735 ASSAY OF MAGNESIUM: CPT

## 2023-02-14 PROCEDURE — 97535 SELF CARE MNGMENT TRAINING: CPT

## 2023-02-14 RX ORDER — GUAIFENESIN 600 MG/1
1200 TABLET, EXTENDED RELEASE ORAL 2 TIMES DAILY
Status: DISCONTINUED | OUTPATIENT
Start: 2023-02-14 | End: 2023-02-21 | Stop reason: HOSPADM

## 2023-02-14 RX ORDER — LEVALBUTEROL INHALATION SOLUTION 1.25 MG/3ML
1.25 SOLUTION RESPIRATORY (INHALATION)
Status: DISCONTINUED | OUTPATIENT
Start: 2023-02-14 | End: 2023-02-21 | Stop reason: HOSPADM

## 2023-02-14 RX ADMIN — IPRATROPIUM BROMIDE AND ALBUTEROL SULFATE 3 ML: 2.5; .5 SOLUTION RESPIRATORY (INHALATION) at 04:13

## 2023-02-14 RX ADMIN — METOPROLOL TARTRATE 25 MG: 25 TABLET, FILM COATED ORAL at 08:51

## 2023-02-14 RX ADMIN — LEVALBUTEROL HYDROCHLORIDE 1.25 MG: 1.25 SOLUTION RESPIRATORY (INHALATION) at 20:54

## 2023-02-14 RX ADMIN — CASTOR OIL AND BALSAM, PERU: 788; 87 OINTMENT TOPICAL at 09:00

## 2023-02-14 RX ADMIN — ENOXAPARIN SODIUM 30 MG: 100 INJECTION SUBCUTANEOUS at 08:54

## 2023-02-14 RX ADMIN — THERA TABS 1 TABLET: TAB at 08:50

## 2023-02-14 RX ADMIN — Medication 15 MG: at 13:36

## 2023-02-14 RX ADMIN — GUAIFENESIN 1200 MG: 600 TABLET, EXTENDED RELEASE ORAL at 12:09

## 2023-02-14 RX ADMIN — METOPROLOL TARTRATE 25 MG: 25 TABLET, FILM COATED ORAL at 21:34

## 2023-02-14 RX ADMIN — ACETAMINOPHEN 325MG 650 MG: 325 TABLET ORAL at 21:34

## 2023-02-14 RX ADMIN — THIAMINE HCL TAB 100 MG 100 MG: 100 TAB at 08:51

## 2023-02-14 RX ADMIN — LEVALBUTEROL HYDROCHLORIDE 1.25 MG: 1.25 SOLUTION RESPIRATORY (INHALATION) at 08:37

## 2023-02-14 RX ADMIN — SODIUM CHLORIDE, PRESERVATIVE FREE 10 ML: 5 INJECTION INTRAVENOUS at 21:35

## 2023-02-14 RX ADMIN — LEVALBUTEROL HYDROCHLORIDE 1.25 MG: 1.25 SOLUTION RESPIRATORY (INHALATION) at 13:39

## 2023-02-14 RX ADMIN — PREDNISONE 40 MG: 20 TABLET ORAL at 08:51

## 2023-02-14 RX ADMIN — IPRATROPIUM BROMIDE 0.5 MG: 0.5 SOLUTION RESPIRATORY (INHALATION) at 13:39

## 2023-02-14 RX ADMIN — GUAIFENESIN 1200 MG: 600 TABLET, EXTENDED RELEASE ORAL at 17:27

## 2023-02-14 RX ADMIN — SODIUM CHLORIDE, PRESERVATIVE FREE 10 ML: 5 INJECTION INTRAVENOUS at 05:05

## 2023-02-14 RX ADMIN — CASTOR OIL AND BALSAM, PERU: 788; 87 OINTMENT TOPICAL at 21:36

## 2023-02-14 RX ADMIN — SODIUM CHLORIDE, PRESERVATIVE FREE 10 ML: 5 INJECTION INTRAVENOUS at 17:28

## 2023-02-14 RX ADMIN — FOLIC ACID 1 MG: 1 TABLET ORAL at 08:50

## 2023-02-14 NOTE — PROGRESS NOTES
Problem: Mobility Impaired (Adult and Pediatric)  Goal: *Acute Goals and Plan of Care (Insert Text)  Description: FUNCTIONAL STATUS PRIOR TO ADMISSION: Patient was independent and active without use of DME.    HOME SUPPORT PRIOR TO ADMISSION: Per chart patient lived with mother. Possibly caregiver for mother    Physical Therapy Goals  Initiated 2/8/2023  1. Patient will move from supine to sit and sit to supine  in bed with supervision within 7 day(s). 2.  Patient will transfer from bed to chair and chair to bed with CGA using the least restrictive device within 7 day(s). 3.  Patient will perform sit to stand with CGA within 7 day(s). 4.  Patient will ambulate with CGA for 250 feet with the least restrictive device within 7 day(s). Outcome: Progressing Towards Goal     PHYSICAL THERAPY TREATMENT  Patient: Belkis Lea (58 y.o. female)  Date: 2/14/2023  Diagnosis: Acute respiratory distress [R06.03]  Hyponatremia [E87.1] <principal problem not specified>      Precautions: Fall, Bed Alarm  Chart, physical therapy assessment, plan of care and goals were reviewed. ASSESSMENT  Patient continues with skilled PT services and is progressing towards goals. Pt was received in supine and cleared by nursing to mobilize. She was on 2L and attempted to wean to RA. Able to come to the EOB and stood with additional time. Took a few steps to the chair with HHA and unsteady gait. Oxygen dropped to 86%, cued to PLB but was unable to recover past 88%. Placed back on 2L and able to increase oxygen saturation to 94%. Educated on incentive spirometer. Current Level of Function Impacting Discharge (mobility/balance): CGA    Other factors to consider for discharge: increased oxygen needs         PLAN :  Patient continues to benefit from skilled intervention to address the above impairments. Continue treatment per established plan of care. to address goals.     Recommendation for discharge: (in order for the patient to meet his/her long term goals)  Physical therapy at least 2 days/week in the home AND ensure assist and/or supervision for safety with increased assistance    This discharge recommendation:  Has been made in collaboration with the attending provider and/or case management    IF patient discharges home will need the following DME: rolling walker       SUBJECTIVE:   Patient stated Savannah Ward need to wean my oxygen.     OBJECTIVE DATA SUMMARY:   Critical Behavior:  Neurologic State: Alert  Orientation Level: Oriented X4  Cognition: Follows commands  Safety/Judgement: Awareness of environment, Decreased insight into deficits, Fall prevention  Functional Mobility Training:  Bed Mobility:  Rolling: Stand-by assistance  Supine to Sit: Stand-by assistance     Scooting: Stand-by assistance        Transfers:  Sit to Stand: Contact guard assistance  Stand to Sit: Stand-by assistance        Bed to Chair: Contact guard assistance                    Balance:  Sitting: Intact  Standing: Impaired; Without support  Standing - Static: Good  Standing - Dynamic : Good;Fair  Ambulation/Gait Training:  Distance (ft): 5 Feet (ft)     Ambulation - Level of Assistance: Contact guard assistance        Gait Abnormalities: Decreased step clearance;Shuffling gait              Speed/Maria Luisa: Pace decreased (<100 feet/min)  Step Length: Left shortened;Right shortened                  Activity Tolerance:   desaturates with exertion and requires oxygen    After treatment patient left in no apparent distress:   Sitting in chair, Call bell within reach, and Bed / chair alarm activated    COMMUNICATION/COLLABORATION:   The patients plan of care was discussed with: Occupational therapist and Registered nurse.      Maria Fernanda Turcios PT, DPT   Time Calculation: 12 mins

## 2023-02-14 NOTE — PROGRESS NOTES
Hospitalist Progress Note    NAME: Rasheeda Hernandez   :  1965   MRN:  [id number]       Assessment / Plan:  Acute hypoxic respiratory failure, POA  COPD exacerbation, POA  Multiple rib fractures, POA  - EKG on admission with no acute ischemic changes  - proBNP around 1600  - On oxygen 2 L via nasal cannula, wean O2 as able. Will need to be weaned before DC due to insurance issues  - CTA with no PE. Acute right ninth rib fracture, multiple bilateral remote rib fractures  - Spirometry as tolerated  -  Add mucinex  - Add DuoNeb, arformoterol/budesonide, DC prednisone, has received 5 day course. - Wean off oxygen as tolerated, likely she will need oxygen on discharge  - Oxygen challenge before discharge  - PT OT eval       Acute on chronic hyponatremia, POA  - Presented with sodium 118, baseline around 126  - sodium has been fluctuating, 126 today  - Nephrology input is appreciated  - FR  - Patient receiving tolvaptan  - Follow BMP very closely  - Etiology can be related for alcohol abuse and possibly excess water intake although patient is denying    Suicidal ideation    - Suicide precautions, constant observation  - Will not need to be involuntary committed but can by voluntary committed if patient chooses. She is leaning towards not going to inpatient psych    Alcohol abuse  - Patient reported that she drinks on daily basis  - Watch for alcohol withdrawal syndrome    Sinus tachycardia  Introduce a BB    Nicotine use  - Counseling was provided about smoking cessation  - Nicotine patch    Incidental finding on CTA chest    Compression of multiple vertebral bodies. Partial atelectasis/scarring in the right middle lobe. Partially opacified bronchioles supplying the posterior aspect of the left  lower lobe suggesting mucus plugging and/or bronchitis. Will need close follow-up as an outpatient    18.5 - 24.9 Normal weight / Body mass index is 17.07 kg/m².     Estimated discharge date: February 15  Barriers: PT OT, oxygen challenge, sodium improvement    Code status: Full  Prophylaxis: Lovenox  Recommended Disposition:  TBD     Subjective:     Patient was seen and examined. No acute events overnight. Discussed with RN overnight events. Patient seen and examined. She says her breathing has been better after getting a breathing treatment. Discussed disposition when she is weaned off O2. Says she is leaning towards going home. Worried about her mother, but she is currently in the hospital.  Wants to know if she'll be able to work with therapy today or not. Review of Systems:  Symptom Y/N Comments  Symptom Y/N Comments   Fever/Chills n   Chest Pain y Pleuritic   Poor Appetite    Edema     Cough n   Abdominal Pain n    Sputum    Joint Pain     SOB/JARAMILLO n   Pruritis/Rash     Nausea/vomit n   Tolerating PT/OT     Diarrhea    Tolerating Diet y    Constipation    Other       Could NOT obtain due to:          Objective:     VITALS:   Last 24hrs VS reviewed since prior progress note. Most recent are:  Patient Vitals for the past 24 hrs:   Temp Pulse Resp BP SpO2   02/14/23 0838 -- -- -- -- 95 %   02/14/23 0743 99 °F (37.2 °C) (!) 106 18 126/70 90 %   02/14/23 0415 -- -- -- -- 99 %   02/14/23 0303 98.6 °F (37 °C) 87 20 105/60 96 %   02/13/23 2328 98.5 °F (36.9 °C) 84 20 106/61 98 %   02/13/23 2028 -- (!) 109 -- -- --   02/13/23 1944 98.6 °F (37 °C) (!) 102 -- 110/63 94 %   02/13/23 1720 -- (!) 107 22 120/65 100 %   02/13/23 1524 98.7 °F (37.1 °C) (!) 104 20 (!) 114/59 98 %   02/13/23 1211 -- -- -- -- 99 %   02/13/23 1101 98.4 °F (36.9 °C) (!) 101 20 124/72 94 %     No intake or output data in the 24 hours ending 02/14/23 0920       I had a face to face encounter and independently examined this patient on 2/14/2023, as outlined below:  PHYSICAL EXAM:  General: WD, WN. Alert, cooperative, no acute distress    EENT:  EOMI. Anicteric sclerae. MMM  Resp:  bilaterally, +ve wheezing, no rales.   No accessory muscle use  CV:  Regular  rhythm,  No edema  GI:  Soft, Non distended, Non tender. +Bowel sounds  Neurologic:  EOMs intact. No facial asymmetry. No aphasia or slurred speech. Symmetrical strength, Sensation grossly intact. Alert and oriented X 4.     Psych:   Good insight. Not anxious nor agitated  Skin:  No rashes. No jaundice    Reviewed most current lab test results and cultures  YES  Reviewed most current radiology test results   YES  Review and summation of old records today    NO  Reviewed patient's current orders and MAR    YES  PMH/SH reviewed - no change compared to H&P  ________________________________________________________________________  Care Plan discussed with:    Comments   Patient X    Family      RN X    Care Manager     Consultant                        Multidiciplinary team rounds were held today with , nursing, pharmacist and clinical coordinator. Patient's plan of care was discussed; medications were reviewed and discharge planning was addressed. ________________________________________________________________________        Comments   >50% of visit spent in counseling and coordination of care X    ________________________________________________________________________  Daphne Reardon MD     Procedures: see electronic medical records for all procedures/Xrays and details which were not copied into this note but were reviewed prior to creation of Plan. LABS:  I reviewed today's most current labs and imaging studies.   Pertinent labs include:  Recent Labs     02/14/23  0506 02/13/23  0310 02/12/23  0159   WBC 6.7 7.1 4.1   HGB 10.9* 11.4* 13.4   HCT 33.5* 35.1 39.5    194 185     Recent Labs     02/14/23  0506 02/13/23  2341 02/13/23  1656 02/13/23  0900 02/13/23  0310 02/12/23 2128 02/12/23  0159   * 124* 122*   < > 128*   < > 124*   K 4.0  --   --   --  4.2  --  3.7   CL 86*  --   --   --  87*  --  83*   CO2 35*  --   --   --  37*  --  36*   *  -- --   --  95  --  98   BUN 13  --   --   --  11  --  20   CREA 0.32*  --   --   --  0.32*  --  0.37*   CA 8.7  --   --   --  8.7  --  9.2   MG 1.7  --   --   --  1.7  --  1.7   PHOS 2.6  --   --   --  2.7  --  4.1    < > = values in this interval not displayed.        Signed: Paige Sampson MD

## 2023-02-14 NOTE — PROGRESS NOTES
[facility]  Progress Note        NAME: Isidra Clifford       :  1965       MRN:  [id number]     Date/Time: 2023    Risk of deterioration: medium       Assessment:    Plan:  Acute on chronic hyponatremia-behaving like siadh  ETOH  HTN  Psych  Mucous plugging/sob Sodium has been improving with tolvaptan. I will give an additional dose today. Q6 sodium is ordered. I will wait to see what the next sodium is prior to further tolvaptan. Records from [facility] reviewed-has been seen by nephrology there in past-working diagnosis etoh/increased water intake    CTA (-) PE         Subjective:     Chief Complaint:  \"I'm going to eat anything I want to. \"  No N/V. Much more alert today. No dyspnea. No pain. Objective:     VITALS:   Last 24hrs VS reviewed since prior progress note.  Most recent are:  Visit Vitals  /70   Pulse (!) 106   Temp 99 °F (37.2 °C)   Resp 18   Ht 5' 3\" (1.6 m)   Wt 43.7 kg (96 lb 5.5 oz)   SpO2 95%   BMI 17.07 kg/m²     SpO2 Readings from Last 6 Encounters:   23 95%    O2 Flow Rate (L/min): 2 l/min   No intake or output data in the 24 hours ending 23 1017       Telemetry Reviewed       PHYSICAL EXAM:    NAD  No edema     Lab Data Reviewed: (see below)    Medications Reviewed: (see below)    PMH/SH reviewed - no change compared to H&P________________    Attending Physician: Homero Slater MD     ____________________________________________________  MEDICATIONS:  Current Facility-Administered Medications   Medication Dose Route Frequency    guaiFENesin ER (MUCINEX) tablet 1,200 mg  1,200 mg Oral BID    Tolvaptan MAR Reminder Note  1 Each Other Q6H    metoprolol tartrate (LOPRESSOR) tablet 25 mg  25 mg Oral Q12H    enoxaparin (LOVENOX) injection 30 mg  30 mg SubCUTAneous DAILY    sodium chloride (OCEAN) 0.65 % nasal squeeze bottle 2 Spray  2 Spray Both Nostrils Q2H PRN    [Held by provider] sodium chloride soluble tablet 1,000 mg  1 g Oral BID albuterol-ipratropium (DUO-NEB) 2.5 MG-0.5 MG/3 ML  3 mL Nebulization Q4H PRN    predniSONE (DELTASONE) tablet 40 mg  40 mg Oral DAILY WITH BREAKFAST    therapeutic multivitamin (THERAGRAN) tablet 1 Tablet  1 Tablet Oral DAILY    folic acid (FOLVITE) tablet 1 mg  1 mg Oral DAILY    thiamine mononitrate (B-1) tablet 100 mg  100 mg Oral DAILY    sodium chloride (NS) flush 5-40 mL  5-40 mL IntraVENous Q8H    sodium chloride (NS) flush 5-40 mL  5-40 mL IntraVENous PRN    acetaminophen (TYLENOL) tablet 650 mg  650 mg Oral Q6H PRN    Or    acetaminophen (TYLENOL) suppository 650 mg  650 mg Rectal Q6H PRN    polyethylene glycol (MIRALAX) packet 17 g  17 g Oral DAILY PRN    ondansetron (ZOFRAN ODT) tablet 4 mg  4 mg Oral Q8H PRN    Or    ondansetron (ZOFRAN) injection 4 mg  4 mg IntraVENous Q6H PRN    ipratropium (ATROVENT) 0.02 % nebulizer solution 0.5 mg  0.5 mg Nebulization Q4H PRN    levalbuterol (XOPENEX) nebulizer soln 1.25 mg/3 mL  1.25 mg Nebulization Q4H PRN    LORazepam (ATIVAN) injection 1 mg  1 mg IntraVENous Q2H PRN    LORazepam (ATIVAN) injection 2 mg  2 mg IntraVENous Q2H PRN    nicotine (NICODERM CQ) 21 mg/24 hr patch 1 Patch  1 Patch TransDERmal DAILY    balsam peru-castor oiL (VENELEX) ointment   Topical BID        LABS:  Recent Labs     02/14/23  0506 02/13/23  0310   WBC 6.7 7.1   HGB 10.9* 11.4*   HCT 33.5* 35.1    194       Recent Labs     02/14/23  0506 02/13/23  2341 02/13/23  1656 02/13/23  0900 02/13/23  0310 02/12/23  2128 02/12/23  0159   * 124* 122*   < > 128*   < > 124*   K 4.0  --   --   --  4.2  --  3.7   CL 86*  --   --   --  87*  --  83*   CO2 35*  --   --   --  37*  --  36*   BUN 13  --   --   --  11  --  20   CREA 0.32*  --   --   --  0.32*  --  0.37*   *  --   --   --  95  --  98   CA 8.7  --   --   --  8.7  --  9.2   MG 1.7  --   --   --  1.7  --  1.7   PHOS 2.6  --   --   --  2.7  --  4.1    < > = values in this interval not displayed.        No results for input(s): ALT, AP, TBIL, TBILI, TP, ALB, GLOB, GGT, AML, LPSE in the last 72 hours. No lab exists for component: SGOT, GPT, AMYP, HLPSE    No results for input(s): INR, PTP, APTT, INREXT, INREXT in the last 72 hours. No results for input(s): FE, TIBC, PSAT, FERR in the last 72 hours. No results for input(s): PH, PCO2, PO2 in the last 72 hours. No results for input(s): CPK, CKNDX, TROIQ in the last 72 hours.     No lab exists for component: CPKMB  No results found for: [place] [facility]

## 2023-02-14 NOTE — PROGRESS NOTES
Problem: Pressure Injury - Risk of  Goal: *Prevention of pressure injury  Description: Document Kelvin Scale and appropriate interventions in the flowsheet. Outcome: Progressing Towards Goal  Note: Pressure Injury Interventions:  Sensory Interventions: Assess changes in LOC, Assess need for specialty bed, Discuss PT/OT consult with provider, Float heels, Keep linens dry and wrinkle-free, Maintain/enhance activity level, Minimize linen layers, Pressure redistribution bed/mattress (bed type), Turn and reposition approx. every two hours (pillows and wedges if needed)    Moisture Interventions: Absorbent underpads, Apply protective barrier, creams and emollients, Check for incontinence Q2 hours and as needed, Internal/External urinary devices, Offer toileting Q_hr    Activity Interventions: Assess need for specialty bed, Increase time out of bed, Pressure redistribution bed/mattress(bed type), PT/OT evaluation    Mobility Interventions: Assess need for specialty bed, Float heels, HOB 30 degrees or less, PT/OT evaluation, Turn and reposition approx. every two hours(pillow and wedges)    Nutrition Interventions: Document food/fluid/supplement intake, Discuss nutritional consult with provider, Offer support with meals,snacks and hydration    Friction and Shear Interventions: Apply protective barrier, creams and emollients, Minimize layers                Problem: Patient Education: Go to Patient Education Activity  Goal: Patient/Family Education  Outcome: Progressing Towards Goal     Problem: Falls - Risk of  Goal: *Absence of Falls  Description: Document Joshua Kinza Fall Risk and appropriate interventions in the flowsheet.   Outcome: Progressing Towards Goal  Note: Fall Risk Interventions:            Medication Interventions: Bed/chair exit alarm                   Problem: Patient Education: Go to Patient Education Activity  Goal: Patient/Family Education  Outcome: Progressing Towards Goal     Problem: Risk for Elopement  Goal: Patient will not exit the unit/facility without proper escort  Outcome: Progressing Towards Goal     Problem: Breathing Pattern - Ineffective  Goal: *Absence of hypoxia  Outcome: Progressing Towards Goal  Goal: *Use of effective breathing techniques  Outcome: Progressing Towards Goal     Problem: Patient Education: Go to Patient Education Activity  Goal: Patient/Family Education  Outcome: Progressing Towards Goal     Problem: Suicide  Goal: *STG: Remains safe in hospital  Outcome: Progressing Towards Goal  Goal: *STG: Seeks staff when feelings of self harm or harm towards others arise  Outcome: Progressing Towards Goal  Goal: *STG: Attends activities and groups  Outcome: Progressing Towards Goal  Goal: *STG:  Verbalizes alternative ways of dealing with maladaptive feelings/behaviors  Outcome: Progressing Towards Goal  Goal: *STG/LTG: Complies with medication therapy  Outcome: Progressing Towards Goal  Goal: *STG/LTG: No longer expresses self destructive or suicidal thoughts  Outcome: Progressing Towards Goal  Goal: *LTG:  Identifies available community resources  Outcome: Progressing Towards Goal  Goal: *LTG:  Develops proactive suicide prevention plan  Outcome: Progressing Towards Goal  Goal: Interventions  Outcome: Progressing Towards Goal     Problem: Patient Education: Go to Patient Education Activity  Goal: Patient/Family Education  Outcome: Progressing Towards Goal

## 2023-02-14 NOTE — PROGRESS NOTES
Problem: Self Care Deficits Care Plan (Adult)  Goal: *Acute Goals and Plan of Care (Insert Text)  Description: FUNCTIONAL STATUS PRIOR TO ADMISSION: Patient questionable historian secondary to mild confusion but reported being independent in ADLs and functional mobility. HOME SUPPORT: The patient lived with mother. Per chart review, she is her mother's caregiver. Occupational Therapy Goals  Initiated 2/8/2023  1. Patient will perform grooming standing at sink with modified independence within 7 day(s). 2.  Patient will perform upper body dressing with modified independence within 7 day(s). 3.  Patient will perform lower body dressing with modified independence within 7 day(s). 4.  Patient will perform toilet transfers with modified independence within 7 day(s). 5.  Patient will perform all aspects of toileting with modified independence within 7 day(s). Outcome: Progressing Towards Goal   OCCUPATIONAL THERAPY TREATMENT  Patient: Don Carcamo (58 y.o. female)  Date: 2/14/2023  Diagnosis: Acute respiratory distress [R06.03]  Hyponatremia [E87.1] <principal problem not specified>      Precautions: Fall, Bed Alarm  Chart, occupational therapy assessment, plan of care, and goals were reviewed. ASSESSMENT  Patient continues with skilled OT services and is progressing towards goals. Patient received semisupine in bed with sitter present in room on 2L O2 and cleared for therapy by nursing. She was placed on RA for session with sats remaining between 88-91% during session. Patient completed supine > sit with stand-by assist and demonstrated intact sitting balance while completing UB dressing. Patient completed sit > stand and transferred to recliner chair with contact guard assist. Once seated, patient's SpO2 dropped to 86% on RA and unable to recover despite education on pursed lipped breathing. She was placed back on 2L O2 for remainder of session with virginie remaining >90%.  Patient completed seated grooming tasks with supervision while seated, tolerating well. She was also educated at length on incentive spirometer and demonstrated using it with cues for sequencing. Patient was left sitting in recliner chair with all needs met, VSS, chair alarmed, and sitter present in room. Patient would continue to benefit from skilled OT services during acute hospital stay. Recommend patient discharge home with no OT needs, pending progress. Current Level of Function Impacting Discharge (ADLs): set-up/supervision for ADLs, stand-by to contact guard assist for functional mobility     Other factors to consider for discharge: fall risk, EtOH, supplemental O2 requirement          PLAN :  Patient continues to benefit from skilled intervention to address the above impairments. Continue treatment per established plan of care to address goals. Recommendation for discharge: (in order for the patient to meet his/her long term goals)  No skilled occupational therapy/ follow up rehabilitation needs identified at this time. This discharge recommendation:  Has been made in collaboration with the attending provider and/or case management    IF patient discharges home will need the following DME: TBD pending progress       SUBJECTIVE:   Patient stated I finally got some sleep last night.     OBJECTIVE DATA SUMMARY:   Cognitive/Behavioral Status:  Neurologic State: Alert  Orientation Level: Oriented X4  Cognition: Follows commands  Perception: Appears intact  Perseveration: No perseveration noted  Safety/Judgement: Awareness of environment;Decreased insight into deficits; Fall prevention    Functional Mobility and Transfers for ADLs:  Bed Mobility:  Rolling: Stand-by assistance  Supine to Sit: Stand-by assistance  Scooting: Stand-by assistance    Transfers:  Sit to Stand: Contact guard assistance  Stand to Sit: Stand-by assistance   Bed to Chair: Contact guard assistance    Balance:  Sitting: Intact  Standing: Impaired; Without support  Standing - Static: Good  Standing - Dynamic : Good;Fair    ADL Intervention:    Grooming  Position Performed: Seated in chair  Washing Hands: Supervision  Brushing/Combing Hair: Supervision  Cues: Verbal cues provided    Upper Body [address]: Set-up; Supervision  Cues: Verbal cues provided    Cognitive Retraining  Safety/Judgement: Awareness of environment;Decreased insight into deficits; Fall prevention    Pain:  Patient c/o 6/10 headache. RN aware and following. Activity Tolerance:   Fair, desaturates with exertion and requires oxygen, requires rest breaks, and observed SOB with activity    After treatment patient left in no apparent distress:   Sitting in chair, Call bell within reach, Bed / chair alarm activated, and sitter present in room    COMMUNICATION/COLLABORATION:   The patients plan of care was discussed with: Physical therapist and Registered nurse.      GAURANG Murphy/L  Time Calculation: 23 mins

## 2023-02-14 NOTE — PROGRESS NOTES
0700: Bedside shift change report given to Antwan Dillon (oncoming nurse) by Blaire Quintana (offgoing nurse). Report included the following information SBAR and Kardex. 1130: Pt complaining of SOB, requesting PRN breathing treatment. Called respiratory therapy; unable to administer at this time r/t too frequent.

## 2023-02-14 NOTE — PROGRESS NOTES
Problem: Pressure Injury - Risk of  Goal: *Prevention of pressure injury  Description: Document Kelvin Scale and appropriate interventions in the flowsheet. Outcome: Progressing Towards Goal  Note: Pressure Injury Interventions:  Sensory Interventions: Assess changes in LOC    Moisture Interventions: Absorbent underpads, Apply protective barrier, creams and emollients, Check for incontinence Q2 hours and as needed, Internal/External urinary devices, Offer toileting Q_hr    Activity Interventions: Assess need for specialty bed, Increase time out of bed, PT/OT evaluation    Mobility Interventions: Assess need for specialty bed, Float heels, Pressure redistribution bed/mattress (bed type), PT/OT evaluation    Nutrition Interventions: Document food/fluid/supplement intake, Discuss nutritional consult with provider, Offer support with meals,snacks and hydration    Friction and Shear Interventions: Apply protective barrier, creams and emollients, Minimize layers                Problem: Falls - Risk of  Goal: *Absence of Falls  Description: Document Hakeem Pereira Fall Risk and appropriate interventions in the flowsheet.   Outcome: Progressing Towards Goal  Note: Fall Risk Interventions:            Medication Interventions: Bed/chair exit alarm                   Problem: Risk for Elopement  Goal: Patient will not exit the unit/facility without proper escort  Outcome: Progressing Towards Goal     Problem: Breathing Pattern - Ineffective  Goal: *Absence of hypoxia  Outcome: Progressing Towards Goal  Goal: *Use of effective breathing techniques  Outcome: Progressing Towards Goal     Problem: Suicide  Goal: *STG: Remains safe in hospital  Outcome: Progressing Towards Goal  Goal: *STG: Seeks staff when feelings of self harm or harm towards others arise  Outcome: Progressing Towards Goal  Goal: *STG: Attends activities and groups  Outcome: Progressing Towards Goal  Goal: *STG:  Verbalizes alternative ways of dealing with maladaptive feelings/behaviors  Outcome: Progressing Towards Goal  Goal: *STG/LTG: Complies with medication therapy  Outcome: Progressing Towards Goal  Goal: *STG/LTG: No longer expresses self destructive or suicidal thoughts  Outcome: Progressing Towards Goal  Goal: *LTG:  Identifies available community resources  Outcome: Progressing Towards Goal  Goal: *LTG:  Develops proactive suicide prevention plan  Outcome: Progressing Towards Goal  Goal: Interventions  Outcome: Progressing Towards Goal

## 2023-02-14 NOTE — PROGRESS NOTES
Problem: Pressure Injury - Risk of  Goal: *Prevention of pressure injury  Description: Document Kelvin Scale and appropriate interventions in the flowsheet. Outcome: Progressing Towards Goal  Note: Pressure Injury Interventions:  Sensory Interventions: Assess changes in LOC, Assess need for specialty bed, Discuss PT/OT consult with provider, Float heels, Keep linens dry and wrinkle-free, Maintain/enhance activity level, Minimize linen layers, Pressure redistribution bed/mattress (bed type), Turn and reposition approx.  every two hours (pillows and wedges if needed)    Moisture Interventions: Absorbent underpads, Apply protective barrier, creams and emollients, Check for incontinence Q2 hours and as needed, Internal/External urinary devices, Offer toileting Q_hr    Activity Interventions: Increase time out of bed    Mobility Interventions: Assess need for specialty bed, HOB 30 degrees or less    Nutrition Interventions: Document food/fluid/supplement intake    Friction and Shear Interventions: HOB 30 degrees or less, Lift sheet, Minimize layers, Apply protective barrier, creams and emollients                Problem: Patient Education: Go to Patient Education Activity  Goal: Patient/Family Education  Outcome: Progressing Towards Goal     Problem: Breathing Pattern - Ineffective  Goal: *Absence of hypoxia  Outcome: Progressing Towards Goal

## 2023-02-14 NOTE — PROGRESS NOTES
End of Shift Note    Bedside shift change report given to Edwar Perkins RN (oncoming nurse) by Claire Spencer RN (offgoing nurse).   Report included the following information SBAR, Kardex, ED Summary, Intake/Output, MAR, and Recent Results    Shift worked:  night     Shift summary and any significant changes:     Uneventful shift, next serum sodium check is due at 1100     Concerns for physician to address:  none     Zone phone for oncoming shift:   xxx         Claire Spencer RN

## 2023-02-14 NOTE — PROGRESS NOTES
0730 Bedside shift change report given to [address] [place] [address] Lg Chi (oncoming nurse) by Johan Lopez (offgoing nurse). Report included the following information SBAR, Kardex, ED Summary, MAR, Recent Results, and Cardiac Rhythm NSR . End of Shift Note    Bedside shift change report given to Johan Lopez (oncoming nurse) by Arabella Aggarwal (offgoing nurse). Report included the following information SBAR, Kardex, and Cardiac Rhythm NSR/A. Fib     Shift worked:  4881-0895     Shift summary and any significant changes:    Last sodium drawn at 1630 next blood draw at 1030. Na decreased to 122. Tolvaptan ordered for 1900  Ativan given once today. Last CIWA score 3. Psych re-consulted. Patient currently on 1L oxygen. During ambulation had to increase oxygen to 3L d/t decrease in oxygen saturation to 87-88%  Patient had increased wheezing nebulizer given by RT   Concerns for physician to address:  None      Zone phone for oncoming shift:   [phone]       Activity:  Activity Level: Up with Assistance  Number times ambulated in hallways past shift: 0  Number of times OOB to chair past shift: 0    Cardiac:   Cardiac Monitoring: Yes      Cardiac Rhythm: Sinus Rhythm    Access:  Current line(s): PIV     Genitourinary:   Urinary status: voiding    Respiratory:   O2 Device: Nasal cannula  Chronic home O2 use?: NO  Incentive spirometer at bedside: YES  Actual Volume (ml): 250 ml    GI:  Last Bowel Movement Date: 02/10/23  Current diet:  DIET ONE TIME MESSAGE  ADULT ORAL NUTRITION SUPPLEMENT Breakfast, Dinner; Standard High Calorie/High Protein  ADULT DIET Regular; Safety Tray; Safety Tray (Disposables)  Passing flatus: YES  Tolerating current diet: YES       Pain Management:   Patient states pain is manageable on current regimen: YES    Skin:  Kelvin Score: 20  Interventions: float heels, increase time out of bed, and PT/OT consult    Patient Safety:  Fall Score:  Total Score: 1  Interventions: bed/chair alarm, assistive device (walker, cane, etc), gripper socks, pt to call before getting OOB, stay with me (per policy), and sitter at bedside        Length of Stay:  Expected LOS: 3d 12h  Actual LOS: [address]

## 2023-02-15 LAB
ANION GAP SERPL CALC-SCNC: 5 MMOL/L (ref 5–15)
BASOPHILS # BLD: 0 K/UL (ref 0–0.1)
BASOPHILS NFR BLD: 1 % (ref 0–1)
BUN SERPL-MCNC: 11 MG/DL (ref 6–20)
BUN/CREAT SERPL: 38 (ref 12–20)
CALCIUM SERPL-MCNC: 9 MG/DL (ref 8.5–10.1)
CHLORIDE SERPL-SCNC: 91 MMOL/L (ref 97–108)
CO2 SERPL-SCNC: 36 MMOL/L (ref 21–32)
CREAT SERPL-MCNC: 0.29 MG/DL (ref 0.55–1.02)
DIFFERENTIAL METHOD BLD: ABNORMAL
EOSINOPHIL # BLD: 0.1 K/UL (ref 0–0.4)
EOSINOPHIL NFR BLD: 1 % (ref 0–7)
ERYTHROCYTE [DISTWIDTH] IN BLOOD BY AUTOMATED COUNT: 14.3 % (ref 11.5–14.5)
GLUCOSE SERPL-MCNC: 83 MG/DL (ref 65–100)
HCT VFR BLD AUTO: 32.2 % (ref 35–47)
HGB BLD-MCNC: 10.5 G/DL (ref 11.5–16)
IMM GRANULOCYTES # BLD AUTO: 0 K/UL (ref 0–0.04)
IMM GRANULOCYTES NFR BLD AUTO: 1 % (ref 0–0.5)
LYMPHOCYTES # BLD: 2 K/UL (ref 0.8–3.5)
LYMPHOCYTES NFR BLD: 32 % (ref 12–49)
MAGNESIUM SERPL-MCNC: 1.7 MG/DL (ref 1.6–2.4)
MCH RBC QN AUTO: 32.1 PG (ref 26–34)
MCHC RBC AUTO-ENTMCNC: 32.6 G/DL (ref 30–36.5)
MCV RBC AUTO: 98.5 FL (ref 80–99)
MONOCYTES # BLD: 1 K/UL (ref 0–1)
MONOCYTES NFR BLD: 16 % (ref 5–13)
NEUTS SEG # BLD: 3 K/UL (ref 1.8–8)
NEUTS SEG NFR BLD: 49 % (ref 32–75)
NRBC # BLD: 0 K/UL (ref 0–0.01)
NRBC BLD-RTO: 0 PER 100 WBC
PHOSPHATE SERPL-MCNC: 3.5 MG/DL (ref 2.6–4.7)
PLATELET # BLD AUTO: 281 K/UL (ref 150–400)
PMV BLD AUTO: 9.8 FL (ref 8.9–12.9)
POTASSIUM SERPL-SCNC: 4.3 MMOL/L (ref 3.5–5.1)
RBC # BLD AUTO: 3.27 M/UL (ref 3.8–5.2)
SODIUM SERPL-SCNC: 126 MMOL/L (ref 136–145)
SODIUM SERPL-SCNC: 127 MMOL/L (ref 136–145)
SODIUM SERPL-SCNC: 130 MMOL/L (ref 136–145)
SODIUM SERPL-SCNC: 132 MMOL/L (ref 136–145)
WBC # BLD AUTO: 6.1 K/UL (ref 3.6–11)

## 2023-02-15 PROCEDURE — 74011250636 HC RX REV CODE- 250/636: Performed by: INTERNAL MEDICINE

## 2023-02-15 PROCEDURE — 74011636637 HC RX REV CODE- 636/637: Performed by: INTERNAL MEDICINE

## 2023-02-15 PROCEDURE — 74011000250 HC RX REV CODE- 250: Performed by: NURSE PRACTITIONER

## 2023-02-15 PROCEDURE — 94640 AIRWAY INHALATION TREATMENT: CPT

## 2023-02-15 PROCEDURE — 65270000046 HC RM TELEMETRY

## 2023-02-15 PROCEDURE — 80048 BASIC METABOLIC PNL TOTAL CA: CPT

## 2023-02-15 PROCEDURE — 83735 ASSAY OF MAGNESIUM: CPT

## 2023-02-15 PROCEDURE — 77010033678 HC OXYGEN DAILY

## 2023-02-15 PROCEDURE — 84295 ASSAY OF SERUM SODIUM: CPT

## 2023-02-15 PROCEDURE — 85025 COMPLETE CBC W/AUTO DIFF WBC: CPT

## 2023-02-15 PROCEDURE — 74011250637 HC RX REV CODE- 250/637: Performed by: INTERNAL MEDICINE

## 2023-02-15 PROCEDURE — 74011250637 HC RX REV CODE- 250/637: Performed by: NURSE PRACTITIONER

## 2023-02-15 PROCEDURE — 74011000250 HC RX REV CODE- 250: Performed by: INTERNAL MEDICINE

## 2023-02-15 PROCEDURE — 84100 ASSAY OF PHOSPHORUS: CPT

## 2023-02-15 PROCEDURE — 36415 COLL VENOUS BLD VENIPUNCTURE: CPT

## 2023-02-15 RX ORDER — BUTALBITAL, ACETAMINOPHEN AND CAFFEINE 50; 325; 40 MG/1; MG/1; MG/1
1 TABLET ORAL
Status: DISCONTINUED | OUTPATIENT
Start: 2023-02-15 | End: 2023-02-21 | Stop reason: HOSPADM

## 2023-02-15 RX ADMIN — METOPROLOL TARTRATE 25 MG: 25 TABLET, FILM COATED ORAL at 08:37

## 2023-02-15 RX ADMIN — FOLIC ACID 1 MG: 1 TABLET ORAL at 08:37

## 2023-02-15 RX ADMIN — THIAMINE HCL TAB 100 MG 100 MG: 100 TAB at 08:37

## 2023-02-15 RX ADMIN — SODIUM CHLORIDE, PRESERVATIVE FREE 10 ML: 5 INJECTION INTRAVENOUS at 04:59

## 2023-02-15 RX ADMIN — ACETAMINOPHEN 325MG 650 MG: 325 TABLET ORAL at 08:37

## 2023-02-15 RX ADMIN — SODIUM CHLORIDE, PRESERVATIVE FREE 10 ML: 5 INJECTION INTRAVENOUS at 13:50

## 2023-02-15 RX ADMIN — LEVALBUTEROL HYDROCHLORIDE 1.25 MG: 1.25 SOLUTION RESPIRATORY (INHALATION) at 07:56

## 2023-02-15 RX ADMIN — ACETAMINOPHEN 325MG 650 MG: 325 TABLET ORAL at 13:46

## 2023-02-15 RX ADMIN — LEVALBUTEROL HYDROCHLORIDE 1.25 MG: 1.25 SOLUTION RESPIRATORY (INHALATION) at 14:39

## 2023-02-15 RX ADMIN — METOPROLOL TARTRATE 25 MG: 25 TABLET, FILM COATED ORAL at 22:09

## 2023-02-15 RX ADMIN — SODIUM CHLORIDE, PRESERVATIVE FREE 10 ML: 5 INJECTION INTRAVENOUS at 22:12

## 2023-02-15 RX ADMIN — CASTOR OIL AND BALSAM, PERU: 788; 87 OINTMENT TOPICAL at 22:11

## 2023-02-15 RX ADMIN — BUTALBITAL, ACETAMINOPHEN, AND CAFFEINE 1 TABLET: 50; 325; 40 TABLET ORAL at 17:46

## 2023-02-15 RX ADMIN — GUAIFENESIN 1200 MG: 600 TABLET, EXTENDED RELEASE ORAL at 17:46

## 2023-02-15 RX ADMIN — PREDNISONE 40 MG: 20 TABLET ORAL at 08:37

## 2023-02-15 RX ADMIN — ENOXAPARIN SODIUM 30 MG: 100 INJECTION SUBCUTANEOUS at 08:37

## 2023-02-15 RX ADMIN — GUAIFENESIN 1200 MG: 600 TABLET, EXTENDED RELEASE ORAL at 08:37

## 2023-02-15 RX ADMIN — THERA TABS 1 TABLET: TAB at 08:37

## 2023-02-15 RX ADMIN — LEVALBUTEROL HYDROCHLORIDE 1.25 MG: 1.25 SOLUTION RESPIRATORY (INHALATION) at 20:50

## 2023-02-15 RX ADMIN — CASTOR OIL AND BALSAM, PERU: 788; 87 OINTMENT TOPICAL at 08:38

## 2023-02-15 NOTE — PROGRESS NOTES
End of Shift Note    Bedside shift change report given to RN (oncoming nurse) by Han Ny RN (offgoing nurse). Report included the following information SBAR, Kardex, ED Summary, Procedure Summary, Intake/Output, MAR, Recent Results, Med Rec Status, and Cardiac Rhythm Sinus Rhythm    Shift worked:  7a-7p     Shift summary and any significant changes:    Removed 1:1, anton sodium Q6hr, next draw at 2100. Stayed on 2L. Significant other visited at bedside.  1200mL fluid restriction started      Concerns for physician to address:       Zone phone for oncoming shift:

## 2023-02-15 NOTE — PROGRESS NOTES
Problem: Suicide  Goal: *STG: Remains safe in hospital  Outcome: Resolved/Met  Goal: *STG: Seeks staff when feelings of self harm or harm towards others arise  Outcome: Resolved/Met  Goal: *STG: Attends activities and groups  Outcome: Resolved/Met  Goal: *STG:  Verbalizes alternative ways of dealing with maladaptive feelings/behaviors  Outcome: Resolved/Met  Goal: *STG/LTG: Complies with medication therapy  Outcome: Resolved/Met  Goal: *STG/LTG: No longer expresses self destructive or suicidal thoughts  Outcome: Resolved/Met  Goal: *LTG:  Identifies available community resources  Outcome: Resolved/Met  Goal: *LTG:  Develops proactive suicide prevention plan  Outcome: Resolved/Met  Goal: Interventions  Outcome: Resolved/Met     Problem: Patient Education: Go to Patient Education Activity  Goal: Patient/Family Education  Outcome: Resolved/Met

## 2023-02-15 NOTE — PROGRESS NOTES
Transition of Care Plan:    RUR: 11%   Disposition: Return to community- brother staying at [address] [facility] address to help with their mother    Will need script for OP/PT with dx and also meds sent to cost out to pharmacy prior to discharge- get meds sent to [address] to cost out meds - patient has a a jet nebulizer at home     Barrier: Patient is uninsured - likely needs cost out for meds, rolling walker       If SNF or IPR: Date FOC offered:  Date FOC received:  Date authorization started with reference number:  Date authorization received and expires:  Accepting facility:  Follow up appointments: [address]- likely [facility] - pt will let CM know which location  DME needed:rolling walker- in process of weaning O2  Transportation at Discharge: Brother or friend  [facility] or means to access home:   brother     IM Medicare Letter: n/a  Is patient a  and connected with the [place]? If yes, was [place] transfer form completed and VA notified? Caregiver Contact:     Lyubov Reynoso mother [phone]  Liv Raymond friend [phone]  Discharge Caregiver contacted prior to discharge?  Per patient  Care Conference needed?:  no    CM met with patient - declined inpatient MH/alcohol treatment center- encouraged patient to follow-up with MH support instead of alcohol use - plans to go to the home address on file at [address] - patient informed she needs a rolling walker and she will try to see if any of her family can assist- patient provided with OP locations for PT/OT via [address] [place] [address] and [facility] - will need script from MD for OP PT/OT - patient given information of how to apply for Medicaid with [place] and info added to AVS - patient has a jet nebulizer at home - which may be cheaper than inhalers for home if needed- nursing trying to wean O2 - patient informed of cost of home O2 is about $120 a month and $10 per tank at this time and company requires credit card payment.   CM will follow-up with patient in am Wille Kayser, MSW

## 2023-02-15 NOTE — PROGRESS NOTES
End of Shift Note    Bedside shift change report given to JORGE Martinez (oncoming nurse) by Scarlet Baker RN (offgoing nurse).   Report included the following information SBAR, Kardex, ED Summary, Intake/Output, MAR, and Recent Results    Shift worked:  night     Shift summary and any significant changes:     1:1 sitter remains at bedside, sodium 132 this AM     Concerns for physician to address:  none     Zone phone for oncoming shift:   xxx         Scarlet Baker RN

## 2023-02-15 NOTE — PROGRESS NOTES
End of Shift Note    Bedside shift change report given to [address] (oncoming nurse) by Asaf Khan (offgoing nurse). Report included the following information SBAR and Kardex    Shift worked:  day     Shift summary and any significant changes:     xoponex scheduled now Q6H  Weaned to RA    Mucinex added,  cough more productive    1:1 sitter     Concerns for physician to address:       Zone phone for oncoming shift:          Activity:  Activity Level: Up with Assistance  Number times ambulated in hallways past shift: 0  Number of times OOB to chair past shift: 2    Cardiac:   Cardiac Monitoring: Yes      Cardiac Rhythm: Sinus Rhythm    Access:  Current line(s): PIV     Genitourinary:   Urinary status: voiding    Respiratory:   O2 Device: None (Room air)  Chronic home O2 use?: NO  Incentive spirometer at bedside: NO  Actual Volume (ml): 250 ml    GI:  Last Bowel Movement Date: 02/10/23  Current diet:  DIET ONE TIME MESSAGE  ADULT ORAL NUTRITION SUPPLEMENT Breakfast, Dinner; Standard High Calorie/High Protein  ADULT DIET Regular; Safety Tray; Safety Tray (Disposables)  Passing flatus: YES  Tolerating current diet: YES       Pain Management:   Patient states pain is manageable on current regimen: YES    Skin:  Kelvin Score: 19  Interventions: increase time out of bed    Patient Safety:  Fall Score:  Total Score: 1  Interventions: bed/chair alarm, gripper socks, pt to call before getting OOB, stay with me (per policy), and sitter at bedside        Length of Stay:  Expected LOS: 3d 12h  Actual LOS: [address]

## 2023-02-15 NOTE — PROGRESS NOTES
[facility]  Progress Note        NAME: Gillian Morgan       :  1965       MRN:  [id number]     Date/Time: 2/15/2023    Risk of deterioration: medium       Assessment:    Plan:  Acute on chronic hyponatremia-behaving like siadh  ETOH  HTN  Psych  Mucous plugging/sob Sodium has been improving with tolvaptan. Up to 132 today. Hold tolvaptan. Fluid restrict. Records from [facility] reviewed-has been seen by nephrology there in past-working diagnosis etoh/increased water intake             Subjective:     Chief Complaint:  \"I made a mess and spilled everyhting. \"  No N/V. No dyspnea. No pain. Sitter at bedside. Objective:     VITALS:   Last 24hrs VS reviewed since prior progress note.  Most recent are:  Visit Vitals  BP (!) 157/82   Pulse 99   Temp 98.3 °F (36.8 °C)   Resp 20   Ht 5' 3\" (1.6 m)   Wt 43.7 kg (96 lb 5.5 oz)   SpO2 92%   BMI 17.07 kg/m²     SpO2 Readings from Last 6 Encounters:   02/15/23 92%    O2 Flow Rate (L/min): 2 l/min   No intake or output data in the 24 hours ending 02/15/23 1105       Telemetry Reviewed       PHYSICAL EXAM:    NAD  No edema     Lab Data Reviewed: (see below)    Medications Reviewed: (see below)    PMH/SH reviewed - no change compared to H&P________________    Attending Physician: Wayne Calvillo MD     ____________________________________________________  MEDICATIONS:  Current Facility-Administered Medications   Medication Dose Route Frequency    guaiFENesin ER (MUCINEX) tablet 1,200 mg  1,200 mg Oral BID    levalbuterol (XOPENEX) nebulizer soln 1.25 mg/3 mL  1.25 mg Nebulization Q6H RT    Tolvaptan MAR Reminder Note  1 Each Other Q6H    metoprolol tartrate (LOPRESSOR) tablet 25 mg  25 mg Oral Q12H    enoxaparin (LOVENOX) injection 30 mg  30 mg SubCUTAneous DAILY    sodium chloride (OCEAN) 0.65 % nasal squeeze bottle 2 Spray  2 Spray Both Nostrils Q2H PRN    [Held by provider] sodium chloride soluble tablet 1,000 mg  1 g Oral BID    predniSONE (DELTASONE) tablet 40 mg  40 mg Oral DAILY WITH BREAKFAST    therapeutic multivitamin (THERAGRAN) tablet 1 Tablet  1 Tablet Oral DAILY    folic acid (FOLVITE) tablet 1 mg  1 mg Oral DAILY    thiamine mononitrate (B-1) tablet 100 mg  100 mg Oral DAILY    sodium chloride (NS) flush 5-40 mL  5-40 mL IntraVENous Q8H    sodium chloride (NS) flush 5-40 mL  5-40 mL IntraVENous PRN    acetaminophen (TYLENOL) tablet 650 mg  650 mg Oral Q6H PRN    Or    acetaminophen (TYLENOL) suppository 650 mg  650 mg Rectal Q6H PRN    polyethylene glycol (MIRALAX) packet 17 g  17 g Oral DAILY PRN    ondansetron (ZOFRAN ODT) tablet 4 mg  4 mg Oral Q8H PRN    Or    ondansetron (ZOFRAN) injection 4 mg  4 mg IntraVENous Q6H PRN    ipratropium (ATROVENT) 0.02 % nebulizer solution 0.5 mg  0.5 mg Nebulization Q4H PRN    LORazepam (ATIVAN) injection 1 mg  1 mg IntraVENous Q2H PRN    LORazepam (ATIVAN) injection 2 mg  2 mg IntraVENous Q2H PRN    nicotine (NICODERM CQ) 21 mg/24 hr patch 1 Patch  1 Patch TransDERmal DAILY    balsam peru-castor oiL (VENELEX) ointment   Topical BID        LABS:  Recent Labs     02/15/23  0258 02/14/23  0506   WBC 6.1 6.7   HGB 10.5* 10.9*   HCT 32.2* 33.5*    222       Recent Labs     02/15/23  0258 02/14/23  1938 02/14/23  0915 02/14/23  0506 02/13/23  0900 02/13/23  0310   * 130* 130* 126*   < > 128*   K 4.3  --   --  4.0  --  4.2   CL 91*  --   --  86*  --  87*   CO2 36*  --   --  35*  --  37*   BUN 11  --   --  13  --  11   CREA 0.29*  --   --  0.32*  --  0.32*   GLU 83  --   --  113*  --  95   CA 9.0  --   --  8.7  --  8.7   MG 1.7  --   --  1.7  --  1.7   PHOS 3.5  --   --  2.6  --  2.7    < > = values in this interval not displayed. No results for input(s): ALT, AP, TBIL, TBILI, TP, ALB, GLOB, GGT, AML, LPSE in the last 72 hours. No lab exists for component: SGOT, GPT, AMYP, HLPSE    No results for input(s): INR, PTP, APTT, INREXT, INREXT in the last 72 hours.    No results for input(s): FE, TIBC, PSAT, FERR in the last 72 hours. No results for input(s): PH, PCO2, PO2 in the last 72 hours. No results for input(s): CPK, CKNDX, TROIQ in the last 72 hours.     No lab exists for component: CPKMB  No results found for: [facility]

## 2023-02-15 NOTE — PROGRESS NOTES
Problem: Pressure Injury - Risk of  Goal: *Prevention of pressure injury  Description: Document Kelvin Scale and appropriate interventions in the flowsheet. Outcome: Progressing Towards Goal  Note: Pressure Injury Interventions:  Sensory Interventions: Assess changes in LOC, Assess need for specialty bed, Avoid rigorous massage over bony prominences, Float heels, Keep linens dry and wrinkle-free, Maintain/enhance activity level, Minimize linen layers, Monitor skin under medical devices, Turn and reposition approx. every two hours (pillows and wedges if needed)    Moisture Interventions: Absorbent underpads, Apply protective barrier, creams and emollients, Assess need for specialty bed    Activity Interventions: Assess need for specialty bed, Increase time out of bed, Pressure redistribution bed/mattress(bed type)    Mobility Interventions: Assess need for specialty bed, Float heels, HOB 30 degrees or less    Nutrition Interventions: Document food/fluid/supplement intake, Discuss nutritional consult with provider    Friction and Shear Interventions: Apply protective barrier, creams and emollients, Feet elevated on foot rest, Foam dressings/transparent film/skin sealants, Lift team/patient mobility team, Lift sheet                Problem: Patient Education: Go to Patient Education Activity  Goal: Patient/Family Education  Outcome: Progressing Towards Goal     Problem: Falls - Risk of  Goal: *Absence of Falls  Description: Document Hakeem Pereira Fall Risk and appropriate interventions in the flowsheet.   Outcome: Progressing Towards Goal  Note: Fall Risk Interventions:            Medication Interventions: Bed/chair exit alarm                   Problem: Patient Education: Go to Patient Education Activity  Goal: Patient/Family Education  Outcome: Progressing Towards Goal     Problem: Risk for Elopement  Goal: Patient will not exit the unit/facility without proper escort  Outcome: Progressing Towards Goal     Problem: Breathing Pattern - Ineffective  Goal: *Absence of hypoxia  Outcome: Progressing Towards Goal  Goal: *Use of effective breathing techniques  Outcome: Progressing Towards Goal  Goal: *PALLIATIVE CARE:  Alleviation of Dyspnea  Outcome: Progressing Towards Goal     Problem: Patient Education: Go to Patient Education Activity  Goal: Patient/Family Education  Outcome: Progressing Towards Goal     Problem: Suicide  Goal: *STG: Remains safe in hospital  Outcome: Progressing Towards Goal  Goal: *STG: Seeks staff when feelings of self harm or harm towards others arise  Outcome: Progressing Towards Goal  Goal: *STG: Attends activities and groups  Outcome: Progressing Towards Goal  Goal: *STG:  Verbalizes alternative ways of dealing with maladaptive feelings/behaviors  Outcome: Progressing Towards Goal  Goal: *STG/LTG: Complies with medication therapy  Outcome: Progressing Towards Goal  Goal: *STG/LTG: No longer expresses self destructive or suicidal thoughts  Outcome: Progressing Towards Goal  Goal: *LTG:  Identifies available community resources  Outcome: Progressing Towards Goal  Goal: *LTG:  Develops proactive suicide prevention plan  Outcome: Progressing Towards Goal  Goal: Interventions  Outcome: Progressing Towards Goal     Problem: Patient Education: Go to Patient Education Activity  Goal: Patient/Family Education  Outcome: Progressing Towards Goal     Problem: Patient Education: Go to Patient Education Activity  Goal: Patient/Family Education  Outcome: Progressing Towards Goal     Problem: Patient Education: Go to Patient Education Activity  Goal: Patient/Family Education  Outcome: Progressing Towards Goal

## 2023-02-15 NOTE — PROGRESS NOTES
Hospitalist Progress Note    NAME: Ran Gr   :  1965   MRN:  [id number]       Assessment / Plan:  PAUL:  TBD  Barriers:  multiple barriers, no insurance, needs oxygen    Acute hypoxic respiratory failure, POA  COPD exacerbation, POA  Multiple rib fractures, POA  - EKG on admission with no acute ischemic changes  - CTA with no PE. Acute right ninth rib fracture, multiple bilateral remote rib fractures  - Spirometry as tolerated  - Add DuoNeb, arformoterol/budesonide, DC prednisone, has received 5 day course. - Wean off oxygen as tolerated, likely she will need oxygen on discharge  - PT OT eval     Acute on chronic hyponatremia, POA  - Presented with sodium 118, baseline around 126  --improved with tolvaptan  - Nephrology help appreciated  - FR  - Etiology can be related for alcohol abuse and possibly excess water intake although patient is denying. Behaving like SIADH    Suicidal ideation  - Suicide precautions, constant observation  - Will not need to be involuntary committed but can by voluntary committed if patient chooses. She is leaning towards not going to inpatient psych    Alcohol abuse  - Patient reported that she drinks on daily basis  - Watch for alcohol withdrawal syndrome    Sinus tachycardia  --on metoprolol    Nicotine use  - Counseling was provided about smoking cessation  - Nicotine patch    Incidental finding on CTA chest    Compression of multiple vertebral bodies. Partial atelectasis/scarring in the right middle lobe. Partially opacified bronchioles supplying the posterior aspect of the left  lower lobe suggesting mucus plugging and/or bronchitis. Will need close follow-up as an outpatient    18.5 - 24.9 Normal weight / Body mass index is 17.07 kg/m². Code status: Full  Prophylaxis: Lovenox  Recommended Disposition:  TBD     Subjective: Follow up hyponatremia, COPD exacerbation, rib fractures  Patient was seen and examined. No acute events overnight.   Discussed with RN overnight events. Review of Systems:  Symptom Y/N Comments  Symptom Y/N Comments   Fever/Chills n   Chest Pain y Pleuritic   Poor Appetite    Edema     Cough n   Abdominal Pain n    Sputum    Joint Pain     SOB/JARAMILLO n   Pruritis/Rash     Nausea/vomit n   Tolerating PT/OT     Diarrhea    Tolerating Diet y    Constipation    Other       Could NOT obtain due to:          Objective:     VITALS:   Last 24hrs VS reviewed since prior progress note. Most recent are:  Patient Vitals for the past 24 hrs:   Temp Pulse Resp BP SpO2   02/15/23 1131 98.1 °F (36.7 °C) 88 20 (!) 144/85 93 %   02/15/23 0757 -- -- -- -- 92 %   02/15/23 0716 98.3 °F (36.8 °C) 99 20 (!) 157/82 93 %   02/15/23 0413 -- -- -- -- 95 %   02/15/23 0321 98.4 °F (36.9 °C) 95 20 (!) 151/94 94 %   02/15/23 0313 -- 99 -- -- 92 %   02/15/23 0310 -- -- -- -- (!) 88 %   02/14/23 2305 98.2 °F (36.8 °C) 95 20 128/73 93 %   02/14/23 2134 -- (!) 112 -- -- --   02/14/23 2054 -- -- -- -- 99 %   02/14/23 1905 97.4 °F (36.3 °C) (!) 109 -- 133/68 94 %   02/14/23 1512 98.7 °F (37.1 °C) (!) 110 17 128/60 94 %   02/14/23 1510 98.7 °F (37.1 °C) (!) 111 -- -- 93 %       No intake or output data in the 24 hours ending 02/15/23 1409       I had a face to face encounter and independently examined this patient on 2/15/2023, as outlined below:  PHYSICAL EXAM:  General: WD, WN. Alert, cooperative, no acute distress    EENT:  EOMI. Anicteric sclerae. MMM  Resp:  bilaterally, +ve wheezing, no rales. No accessory muscle use  CV:  Regular  rhythm,  No edema  GI:  Soft, Non distended, Non tender. +Bowel sounds  Neurologic:  EOMs intact. No facial asymmetry. No aphasia or slurred speech. Symmetrical strength, Sensation grossly intact. Alert and oriented X 4.     Psych:   Good insight. Not anxious nor agitated  Skin:  No rashes.   No jaundice    Reviewed most current lab test results and cultures  YES  Reviewed most current radiology test results   YES  Review and summation of old records today    NO  Reviewed patient's current orders and MAR    YES  PMH/SH reviewed - no change compared to H&P  ________________________________________________________________________  Care Plan discussed with:    Comments   Patient X    Family      RN X    Care Manager     Consultant                        Multidiciplinary team rounds were held today with , nursing, pharmacist and clinical coordinator. Patient's plan of care was discussed; medications were reviewed and discharge planning was addressed. ________________________________________________________________________        Comments   >50% of visit spent in counseling and coordination of care X    ________________________________________________________________________  Leno Ponce MD     Procedures: see electronic medical records for all procedures/Xrays and details which were not copied into this note but were reviewed prior to creation of Plan. LABS:  I reviewed today's most current labs and imaging studies. Pertinent labs include:  Recent Labs     02/15/23  0258 02/14/23  0506 02/13/23  0310   WBC 6.1 6.7 7.1   HGB 10.5* 10.9* 11.4*   HCT 32.2* 33.5* 35.1    222 194       Recent Labs     02/15/23  0258 02/14/23  1938 02/14/23  0915 02/14/23  0506 02/13/23  0900 02/13/23  0310   * 130* 130* 126*   < > 128*   K 4.3  --   --  4.0  --  4.2   CL 91*  --   --  86*  --  87*   CO2 36*  --   --  35*  --  37*   GLU 83  --   --  113*  --  95   BUN 11  --   --  13  --  11   CREA 0.29*  --   --  0.32*  --  0.32*   CA 9.0  --   --  8.7  --  8.7   MG 1.7  --   --  1.7  --  1.7   PHOS 3.5  --   --  2.6  --  2.7    < > = values in this interval not displayed.          Signed: Leno Ponce MD

## 2023-02-15 NOTE — PROGRESS NOTES
Attempted to schedule NEW PATIENT PCP hospital follow up appointment. Unable to reach anyone, left voicemail requesting a call back to discuss a new patient appt for patient. Pending patient discharge.  Julianne Ace, Care Management Assistant

## 2023-02-15 NOTE — PROGRESS NOTES
Problem: Pressure Injury - Risk of  Goal: *Prevention of pressure injury  Description: Document Kelvin Scale and appropriate interventions in the flowsheet. Outcome: Progressing Towards Goal  Note: Pressure Injury Interventions:  Sensory Interventions: Assess changes in LOC, Assess need for specialty bed, Discuss PT/OT consult with provider, Float heels, Keep linens dry and wrinkle-free, Maintain/enhance activity level, Minimize linen layers, Pressure redistribution bed/mattress (bed type), Turn and reposition approx. every two hours (pillows and wedges if needed)    Moisture Interventions: Absorbent underpads, Apply protective barrier, creams and emollients, Check for incontinence Q2 hours and as needed, Internal/External urinary devices, Offer toileting Q_hr    Activity Interventions: Increase time out of bed    Mobility Interventions: Assess need for specialty bed, HOB 30 degrees or less    Nutrition Interventions: Document food/fluid/supplement intake    Friction and Shear Interventions: HOB 30 degrees or less, Lift sheet, Minimize layers, Apply protective barrier, creams and emollients       Problem: Falls - Risk of  Goal: *Absence of Falls  Description: Document Caitlin Fall Risk and appropriate interventions in the flowsheet.   Outcome: Progressing Towards Goal  Note: Fall Risk Interventions:       Medication Interventions: Bed/chair exit alarm       Problem: Risk for Elopement  Goal: Patient will not exit the unit/facility without proper escort  Outcome: Progressing Towards Goal     Problem: Breathing Pattern - Ineffective  Goal: *Absence of hypoxia  Outcome: Progressing Towards Goal  Goal: *Use of effective breathing techniques  Outcome: Progressing Towards Goal     Problem: Suicide  Goal: *STG: Remains safe in hospital  Outcome: Progressing Towards Goal  Goal: *STG: Seeks staff when feelings of self harm or harm towards others arise  Outcome: Progressing Towards Goal  Goal: *STG: Attends activities and groups  Outcome: Progressing Towards Goal  Goal: *STG:  Verbalizes alternative ways of dealing with maladaptive feelings/behaviors  Outcome: Progressing Towards Goal  Goal: *STG/LTG: Complies with medication therapy  Outcome: Progressing Towards Goal  Goal: *STG/LTG: No longer expresses self destructive or suicidal thoughts  Outcome: Progressing Towards Goal  Goal: *LTG:  Identifies available community resources  Outcome: Progressing Towards Goal  Goal: *LTG:  Develops proactive suicide prevention plan  Outcome: Progressing Towards Goal  Goal: Interventions  Outcome: Progressing Towards Goal

## 2023-02-15 NOTE — PROGRESS NOTES
Comprehensive Nutrition Assessment    Type and Reason for Visit: Reassess    Nutrition Recommendations/Plan:   Continue current diet and supplements  Please document % meals and supplements consumed in flowsheet I/O's under intake      Malnutrition Assessment:  Malnutrition Status:  Mild malnutrition (02/15/23 1400)    Context:  Chronic illness     Findings of the 6 clinical characteristics of malnutrition:   Energy Intake:  No significant decrease in energy intake  Weight Loss:  No significant weight loss     Body Fat Loss:  Mild body fat loss, Orbital, Buccal region   Muscle Mass Loss:  Mild muscle mass loss, Clavicles (pectoralis &deltoids), Temples (temporalis)  Fluid Accumulation:  No significant fluid accumulation,     Strength:  Not performed     Nutrition Assessment:     Chart reviewed for follow up. Pt reports a good appetite since admission, may be eating more than she typically does at home. She is finishing most of the food from each tray, confirmed per bedside sitter. She is drinking one of the Ensure's daily, but is afraid to drink much more d/t her fluid restriction. Pt unsure of her usual body weight, but does not report changes in the fit of her clothes at home. Visitor at bedside reports pt looks the same as she has for several years. Patient Vitals for the past 168 hrs:   % Diet Eaten   02/11/23 1226 76 - 100%   02/11/23 0830 76 - 100%       Nutrition Related Findings:    Labs: Na 132. Meds: folvite, prednisone, MVI, thiamine. BM 2/10. Wound Type: None    Current Nutrition Intake & Therapies:  Average Meal Intake: %  Average Supplement Intake: 26-50%  DIET ONE TIME MESSAGE  ADULT ORAL NUTRITION SUPPLEMENT Breakfast, Dinner; Standard High Calorie/High Protein  ADULT DIET Regular; 1200 ml; Safety Tray; Safety Tray (Disposables)    Anthropometric Measures:  Height: 5' 3\" (160 cm)  Ideal Body Weight (IBW): 115 lbs (52 kg)     Current Body Wt:  43.7 kg (96 lb 5.5 oz), 83.4 % IBW. Bed scale  Current BMI (kg/m2): 17.1        Weight Adjustment: No adjustment                 BMI Category: Underweight (BMI less than 18.5)    Estimated Daily Nutrient Needs:  Energy Requirements Based On: Formula  Weight Used for Energy Requirements: Current  Energy (kcal/day): 1580 kcals (BMR x 1.3AF + 300 for wt gain)  Weight Used for Protein Requirements: Admission  Protein (g/day): 65g (1.5g/kg)  Method Used for Fluid Requirements: 1 ml/kcal  Fluid (ml/day): 1580mL or per MD    Nutrition Diagnosis:   Underweight related to inadequate protein-energy intake (& etoh abuse) as evidenced by BMI  Dx continues.      Nutrition Interventions:   Food and/or Nutrient Delivery: Continue current diet, Continue oral nutrition supplement  Nutrition Education/Counseling: No recommendations at this time  Coordination of Nutrition Care: Continue to monitor while inpatient       Goals:  Previous Goal Met: Progressing toward goal(s)  Goals: PO intake 75% or greater, by next RD assessment       Nutrition Monitoring and Evaluation:   Behavioral-Environmental Outcomes: None identified  Food/Nutrient Intake Outcomes: Food and nutrient intake, Supplement intake  Physical Signs/Symptoms Outcomes: Biochemical data, GI status, Weight, Nutrition focused physical findings    Discharge Planning:    Continue current diet, Continue oral nutrition supplement    Tulio Villagomez RD  Contact: [phone]

## 2023-02-16 LAB
ANION GAP SERPL CALC-SCNC: 7 MMOL/L (ref 5–15)
BUN SERPL-MCNC: 8 MG/DL (ref 6–20)
BUN/CREAT SERPL: 22 (ref 12–20)
CALCIUM SERPL-MCNC: 9.3 MG/DL (ref 8.5–10.1)
CHLORIDE SERPL-SCNC: 86 MMOL/L (ref 97–108)
CO2 SERPL-SCNC: 35 MMOL/L (ref 21–32)
CREAT SERPL-MCNC: 0.36 MG/DL (ref 0.55–1.02)
GLUCOSE SERPL-MCNC: 91 MG/DL (ref 65–100)
MAGNESIUM SERPL-MCNC: 1.8 MG/DL (ref 1.6–2.4)
POTASSIUM SERPL-SCNC: 4.4 MMOL/L (ref 3.5–5.1)
SODIUM SERPL-SCNC: 124 MMOL/L (ref 136–145)
SODIUM SERPL-SCNC: 126 MMOL/L (ref 136–145)
SODIUM SERPL-SCNC: 128 MMOL/L (ref 136–145)
SODIUM SERPL-SCNC: 131 MMOL/L (ref 136–145)

## 2023-02-16 PROCEDURE — 74011250637 HC RX REV CODE- 250/637: Performed by: INTERNAL MEDICINE

## 2023-02-16 PROCEDURE — 74011636637 HC RX REV CODE- 636/637: Performed by: INTERNAL MEDICINE

## 2023-02-16 PROCEDURE — 97530 THERAPEUTIC ACTIVITIES: CPT

## 2023-02-16 PROCEDURE — 94640 AIRWAY INHALATION TREATMENT: CPT

## 2023-02-16 PROCEDURE — 74011000250 HC RX REV CODE- 250: Performed by: INTERNAL MEDICINE

## 2023-02-16 PROCEDURE — 97535 SELF CARE MNGMENT TRAINING: CPT

## 2023-02-16 PROCEDURE — 80048 BASIC METABOLIC PNL TOTAL CA: CPT

## 2023-02-16 PROCEDURE — 83735 ASSAY OF MAGNESIUM: CPT

## 2023-02-16 PROCEDURE — 77010033678 HC OXYGEN DAILY

## 2023-02-16 PROCEDURE — 36415 COLL VENOUS BLD VENIPUNCTURE: CPT

## 2023-02-16 PROCEDURE — 97116 GAIT TRAINING THERAPY: CPT

## 2023-02-16 PROCEDURE — 74011000250 HC RX REV CODE- 250: Performed by: NURSE PRACTITIONER

## 2023-02-16 PROCEDURE — 74011250637 HC RX REV CODE- 250/637: Performed by: NURSE PRACTITIONER

## 2023-02-16 PROCEDURE — 84295 ASSAY OF SERUM SODIUM: CPT

## 2023-02-16 PROCEDURE — 65270000046 HC RM TELEMETRY

## 2023-02-16 PROCEDURE — 74011250636 HC RX REV CODE- 250/636: Performed by: INTERNAL MEDICINE

## 2023-02-16 RX ORDER — PREDNISONE 20 MG/1
40 TABLET ORAL
Status: DISCONTINUED | OUTPATIENT
Start: 2023-02-16 | End: 2023-02-21 | Stop reason: HOSPADM

## 2023-02-16 RX ORDER — BUDESONIDE 0.5 MG/2ML
500 INHALANT ORAL
Status: DISCONTINUED | OUTPATIENT
Start: 2023-02-16 | End: 2023-02-21 | Stop reason: HOSPADM

## 2023-02-16 RX ORDER — ALPRAZOLAM 0.5 MG/1
0.5 TABLET ORAL
Status: DISCONTINUED | OUTPATIENT
Start: 2023-02-16 | End: 2023-02-21 | Stop reason: HOSPADM

## 2023-02-16 RX ADMIN — BUTALBITAL, ACETAMINOPHEN, AND CAFFEINE 1 TABLET: 50; 325; 40 TABLET ORAL at 18:09

## 2023-02-16 RX ADMIN — LEVALBUTEROL HYDROCHLORIDE 1.25 MG: 1.25 SOLUTION RESPIRATORY (INHALATION) at 19:35

## 2023-02-16 RX ADMIN — IPRATROPIUM BROMIDE 0.5 MG: 0.5 SOLUTION RESPIRATORY (INHALATION) at 04:16

## 2023-02-16 RX ADMIN — FOLIC ACID 1 MG: 1 TABLET ORAL at 08:55

## 2023-02-16 RX ADMIN — THERA TABS 1 TABLET: TAB at 08:56

## 2023-02-16 RX ADMIN — GUAIFENESIN 1200 MG: 600 TABLET, EXTENDED RELEASE ORAL at 08:55

## 2023-02-16 RX ADMIN — ENOXAPARIN SODIUM 30 MG: 100 INJECTION SUBCUTANEOUS at 08:56

## 2023-02-16 RX ADMIN — BUDESONIDE INHALATION 500 MCG: 0.5 SUSPENSION RESPIRATORY (INHALATION) at 19:36

## 2023-02-16 RX ADMIN — BUDESONIDE INHALATION 500 MCG: 0.5 SUSPENSION RESPIRATORY (INHALATION) at 13:47

## 2023-02-16 RX ADMIN — LEVALBUTEROL HYDROCHLORIDE 1.25 MG: 1.25 SOLUTION RESPIRATORY (INHALATION) at 01:54

## 2023-02-16 RX ADMIN — ACETAMINOPHEN 325MG 650 MG: 325 TABLET ORAL at 08:55

## 2023-02-16 RX ADMIN — LEVALBUTEROL HYDROCHLORIDE 1.25 MG: 1.25 SOLUTION RESPIRATORY (INHALATION) at 07:20

## 2023-02-16 RX ADMIN — Medication 15 MG: at 14:34

## 2023-02-16 RX ADMIN — METOPROLOL TARTRATE 25 MG: 25 TABLET, FILM COATED ORAL at 08:55

## 2023-02-16 RX ADMIN — LEVALBUTEROL HYDROCHLORIDE 1.25 MG: 1.25 SOLUTION RESPIRATORY (INHALATION) at 13:49

## 2023-02-16 RX ADMIN — CASTOR OIL AND BALSAM, PERU: 788; 87 OINTMENT TOPICAL at 21:00

## 2023-02-16 RX ADMIN — SODIUM CHLORIDE, PRESERVATIVE FREE 10 ML: 5 INJECTION INTRAVENOUS at 14:34

## 2023-02-16 RX ADMIN — SODIUM CHLORIDE, PRESERVATIVE FREE 10 ML: 5 INJECTION INTRAVENOUS at 05:14

## 2023-02-16 RX ADMIN — THIAMINE HCL TAB 100 MG 100 MG: 100 TAB at 08:55

## 2023-02-16 RX ADMIN — METOPROLOL TARTRATE 25 MG: 25 TABLET, FILM COATED ORAL at 22:46

## 2023-02-16 RX ADMIN — GUAIFENESIN 1200 MG: 600 TABLET, EXTENDED RELEASE ORAL at 18:07

## 2023-02-16 RX ADMIN — PREDNISONE 40 MG: 20 TABLET ORAL at 09:58

## 2023-02-16 RX ADMIN — CASTOR OIL AND BALSAM, PERU: 788; 87 OINTMENT TOPICAL at 08:58

## 2023-02-16 NOTE — PROGRESS NOTES
Problem: Self Care Deficits Care Plan (Adult)  Goal: *Acute Goals and Plan of Care (Insert Text)  Description: FUNCTIONAL STATUS PRIOR TO ADMISSION: Patient questionable historian secondary to mild confusion but reported being independent in ADLs and functional mobility. HOME SUPPORT: The patient lived with mother. Per chart review, she is her mother's caregiver. Occupational Therapy Goals  Initiated 2/8/2023; Weekly Re-assessment 2/16/2023- Continue all goals  1. Patient will perform grooming standing at sink with modified independence within 7 day(s). 2.  Patient will perform upper body dressing with modified independence within 7 day(s). 3.  Patient will perform lower body dressing with modified independence within 7 day(s). 4.  Patient will perform toilet transfers with modified independence within 7 day(s). 5.  Patient will perform all aspects of toileting with modified independence within 7 day(s). Outcome: Progressing Towards Goal    OCCUPATIONAL THERAPY TREATMENT/WEEKLY RE-ASSESSMENT  Patient: Rosas Ying (58 y.o. female)  Date: 2/16/2023  Diagnosis: Acute respiratory distress [R06.03]  Hyponatremia [E87.1] <principal problem not specified>      Precautions: Fall, Bed Alarm  Chart, occupational therapy assessment, plan of care, and goals were reviewed. ASSESSMENT  Patient continues with skilled OT services and is progressing towards goals. Pt noted with progressive mobility/balance and activity tolerance, functionally evidenced by completion RW level toileting and ADL item gathering with Supervision/SBA and Min verbal cues to push up from seated surface rather than pulling up, as well as, for PLB. Pt educated on ADL/IADL energy conservation, with handout provided and good understanding verbalized.   All goals continued at their current levels, with pt to continue to benefit from skilled OT to address functional deficits during acute hospitalization and no anticipated future OT needs once discharged. Of note, pt able to tolerate ADLs/treatment at 1 liter, briefly dropping to 88% during ambulation; however, quickly rebounding to >93% with rest breaks and PLB cuing. Current Level of Function Impacting Discharge (ADLs): Supervision    Other factors to consider for discharge: 02 needs         PLAN :  Goals have been updated based on progression since last assessment. Patient continues to benefit from skilled intervention to address the above impairments. Continue to follow patient 3 times a week to address goals. Recommend with staff: OOB meals, Active ADL engagement    Recommend next OT session: POC progression    Recommendation for discharge: (in order for the patient to meet his/her long term goals)  No skilled occupational therapy/ follow up rehabilitation needs identified at this time. This discharge recommendation:  Has been made in collaboration with the attending provider and/or case management    IF patient discharges home will need the following DME: walker: rolling       SUBJECTIVE:   Patient stated Thanks for your help, have a good day.     OBJECTIVE DATA SUMMARY:   Cognitive/Behavioral Status:  Neurologic State: Alert  Orientation Level: Oriented X4  Cognition: Appropriate decision making; Appropriate for age attention/concentration; Appropriate safety awareness  Perception: Appears intact  Perseveration: No perseveration noted  Safety/Judgement: Awareness of environment    Functional Mobility and Transfers for ADLs:  Bed Mobility:  Rolling: Supervision  Supine to Sit: Supervision  Sit to Supine: Supervision  Scooting: Supervision    Transfers:  Sit to Stand: Stand-by assistance (vc for hand placement)  Functional Transfers  Toilet Transfer : Supervision    Pt educated on safe transfer techniques, with specific emphasis on proper hand placement to push up from seated surface rather than attempt to pull self up, fully positioning self in-front of desired seated location, feeling chair on back of legs and reaching back with 1-2 UE to slowly lower self to seated position. Balance:  Sitting: Intact  Standing: Impaired  Standing - Static: Good; Unsupported  Standing - Dynamic : Good;Constant support    ADL Intervention:  Lower Body Dressing Assistance  Socks: Modified independent  Leg Crossed Method Used: Yes  Position Performed: Seated edge of bed    Toileting  Toileting Assistance: Supervision  Bladder Hygiene: Modified independent  Clothing Management: Supervision  Cues: Verbal cues provided (for PLB)  Adaptive Equipment: Walker    Cognitive Retraining  Safety/Judgement: Awareness of environment    Therapeutic Activity:   Completed ADL item gathering, RW level, challenging mobility/balance with multi-directional changes and sustained reach outside base of support, PLB technique, activity tolerance and energy conservation, with Supervision and Min verbal cues for PLB. Pain:  No c/o pain    Activity Tolerance:   Good, Fair, desaturates with exertion and requires oxygen, and requires frequent rest breaks    After treatment patient left in no apparent distress:   Supine in bed, Call bell within reach, Bed / chair alarm activated, and Side rails x 3    COMMUNICATION/COLLABORATION:   The patients plan of care was discussed with: Physical therapist and Registered nurse.      Marco Quinn OT  Time Calculation: 23 mins

## 2023-02-16 NOTE — PROGRESS NOTES
[facility]  Progress Note        NAME: Panda Abdi       :  1965       MRN:  [id number]     Date/Time: 2023    Risk of deterioration: medium       Assessment:    Plan:  Acute on chronic hyponatremia-behaving like siadh  ETOH  HTN  Psych  Mucous plugging/sob Sodium has been improving with tolvaptan. Down today. Tolvaptan today. [place] [facility] [place] by [place]rology there in past-working diagnosis etoh/increased water intake             Subjective:     Chief Complaint:  When can I go home? \"  No N/V. No dyspnea. No pain. Objective:     VITALS:   Last 24hrs VS reviewed since prior progress note.  Most recent are:  Visit Vitals  BP (!) 150/85   Pulse 84   Temp 98.5 °F (36.9 °C)   Resp 20   Ht 5' 3\" (1.6 m)   Wt 43.7 kg (96 lb 5.5 oz)   SpO2 94%   BMI 17.07 kg/m²     SpO2 Readings from Last 6 Encounters:   23 94%    O2 Flow Rate (L/min): 1 l/min     Intake/Output Summary (Last 24 hours) at 2023 1114  Last data filed at 2023 0511  Gross per 24 hour   Intake 422 ml   Output --   Net 422 ml          Telemetry Reviewed       PHYSICAL EXAM:    NAD  No edema     Lab Data Reviewed: (see below)    Medications Reviewed: (see below)    PMH/SH reviewed - no change compared to H&P________________    Attending Physician: Norris Ha MD     ____________________________________________________  MEDICATIONS:  Current Facility-Administered Medications   Medication Dose Route Frequency    predniSONE (DELTASONE) tablet 40 mg  40 mg Oral DAILY WITH BREAKFAST    budesonide (PULMICORT) 500 mcg/2 ml nebulizer suspension  500 mcg Nebulization BID RT    ALPRAZolam (XANAX) tablet 0.5 mg  0.5 mg Oral QID PRN    tolvaptan (SAMSCA) tablet (0.5 X 30 MG) 15 mg  15 mg Oral ONCE    butalbital-acetaminophen-caffeine (FIORICET, ESGIC) -40 mg per tablet 1 Tablet  1 Tablet Oral Q6H PRN    guaiFENesin ER (MUCINEX) tablet 1,200 mg  1,200 mg Oral BID    levalbuterol (Sami Gould) nebulizer soln 1.25 mg/3 mL  1.25 mg Nebulization Q6H RT    Tolvaptan MAR Reminder Note  1 Each Other Q6H    metoprolol tartrate (LOPRESSOR) tablet 25 mg  25 mg Oral Q12H    enoxaparin (LOVENOX) injection 30 mg  30 mg SubCUTAneous DAILY    sodium chloride (OCEAN) 0.65 % nasal squeeze bottle 2 Spray  2 Spray Both Nostrils Q2H PRN    [Held by provider] sodium chloride soluble tablet 1,000 mg  1 g Oral BID    therapeutic multivitamin (THERAGRAN) tablet 1 Tablet  1 Tablet Oral DAILY    folic acid (FOLVITE) tablet 1 mg  1 mg Oral DAILY    thiamine mononitrate (B-1) tablet 100 mg  100 mg Oral DAILY    sodium chloride (NS) flush 5-40 mL  5-40 mL IntraVENous Q8H    sodium chloride (NS) flush 5-40 mL  5-40 mL IntraVENous PRN    acetaminophen (TYLENOL) tablet 650 mg  650 mg Oral Q6H PRN    Or    acetaminophen (TYLENOL) suppository 650 mg  650 mg Rectal Q6H PRN    polyethylene glycol (MIRALAX) packet 17 g  17 g Oral DAILY PRN    ondansetron (ZOFRAN ODT) tablet 4 mg  4 mg Oral Q8H PRN    Or    ondansetron (ZOFRAN) injection 4 mg  4 mg IntraVENous Q6H PRN    ipratropium (ATROVENT) 0.02 % nebulizer solution 0.5 mg  0.5 mg Nebulization Q4H PRN    LORazepam (ATIVAN) injection 1 mg  1 mg IntraVENous Q2H PRN    LORazepam (ATIVAN) injection 2 mg  2 mg IntraVENous Q2H PRN    nicotine (NICODERM CQ) 21 mg/24 hr patch 1 Patch  1 Patch TransDERmal DAILY    balsam peru-castor oiL (VENELEX) ointment   Topical BID        LABS:  Recent Labs     02/15/23  0258 02/14/23  0506   WBC 6.1 6.7   HGB 10.5* 10.9*   HCT 32.2* 33.5*    222       Recent Labs     02/16/23  0950 02/16/23  0403 02/15/23  2207 02/15/23  1359 02/15/23  0258 02/14/23  0915 02/14/23  0506   * 128* 127*   < > 132*   < > 126*   K  --  4.4  --   --  4.3  --  4.0   CL  --  86*  --   --  91*  --  86*   CO2  --  35*  --   --  36*  --  35*   BUN  --  8  --   --  11  --  13   CREA  --  0.36*  --   --  0.29*  --  0.32*   GLU  --  91  --   --  83  --  113*   CA  --  9.3  -- --  9.0  --  8.7   MG  --  1.8  --   --  1.7  --  1.7   PHOS  --   --   --   --  3.5  --  2.6    < > = values in this interval not displayed. No results for input(s): ALT, AP, TBIL, TBILI, TP, ALB, GLOB, GGT, AML, LPSE in the last 72 hours. No lab exists for component: SGOT, GPT, AMYP, HLPSE    No results for input(s): INR, PTP, APTT, INREXT, INREXT in the last 72 hours. No results for input(s): FE, TIBC, PSAT, FERR in the last 72 hours. No results for input(s): PH, PCO2, PO2 in the last 72 hours. No results for input(s): CPK, CKNDX, TROIQ in the last 72 hours.     No lab exists for component: CPKMB  No results found for: Catherine Martinez

## 2023-02-16 NOTE — PROGRESS NOTES
Problem: Mobility Impaired (Adult and Pediatric)  Goal: *Acute Goals and Plan of Care (Insert Text)  Description: FUNCTIONAL STATUS PRIOR TO ADMISSION: Patient was independent and active without use of DME.    HOME SUPPORT PRIOR TO ADMISSION: Per chart patient lived with mother. Possibly caregiver for mother    Physical Therapy Goals  Revised 2/16/2023  1. Patient will move from supine to sit and sit to supine , scoot up and down, and roll side to side in bed with independence within 7 day(s). 2.  Patient will transfer from bed to chair and chair to bed with modified independence using the least restrictive device within 7 day(s). 3.  Patient will perform sit to stand with modified independence within 7 day(s). 4.  Patient will ambulate with modified independence for 50 feet with the least restrictive device within 7 day(s). 5.  Patient will ascend/descend 7 stairs with handrail(s) with modified independence within 7 day(s). Initiated 2/8/2023  1. Patient will move from supine to sit and sit to supine  in bed with supervision within 7 day(s). 2.  Patient will transfer from bed to chair and chair to bed with CGA using the least restrictive device within 7 day(s). MET  3. Patient will perform sit to stand with CGA within 7 day(s). MET  4. Patient will ambulate with CGA for 250 feet with the least restrictive device within 7 day(s). Outcome: Progressing Towards Goal   PHYSICAL THERAPY TREATMENT: WEEKLY REASSESSMENT  Patient: Artem Pratt (58 y.o. female)  Date: 2/16/2023  Primary Diagnosis: Acute respiratory distress [R06.03]  Hyponatremia [E87.1]       Precautions:   Fall, Bed Alarm      ASSESSMENT  Patient continues with skilled PT services and is progressing towards goals. Pt received in bed on 2L of O2, with good saturation and O2 decreased to 1L. Pt agreeable to PT session and noted with improved mobility/balance and activity tolerance this session.   Overall she is S with bed mobility and stand by assist with transfers/ambulation using a RW., but requires verbal cues to push from surface she is leaving from vs pulling up on RW. O2 sats good overall on 1L and pt left on 1L. RN notified. Patient's progression toward goals since last assessment: improved mobility/balance, and activity tolerance    Current Level of Function Impacting Discharge (mobility/balance): S to SBA    Functional Outcome Measure: The patient scored 60/100 on the Barthel Index outcome measure which is indicative of 40% impaired function/adls. Other factors to consider for discharge: O2 needs         PLAN :  Goals have been updated based on progression since last assessment. Patient continues to benefit from skilled intervention to address the above impairments. Recommendations and Planned Interventions: transfer training, gait training, therapeutic exercises, patient and family training/education, and therapeutic activities      Frequency/Duration: Patient will be followed by physical therapy:  3 times a week to address goals. Recommendation for discharge: (in order for the patient to meet his/her long term goals)  Outpatient physical therapy follow up recommended for gait and balance retraining, strengthening    This discharge recommendation:  Has been made in collaboration with the attending provider and/or case management    IF patient discharges home will need the following DME: rolling walker         SUBJECTIVE:   Patient stated I help my mother at home.     OBJECTIVE DATA SUMMARY:   HISTORY:    No past medical history on file. No past surgical history on file. Personal factors and/or comorbidities impacting plan of care: fatigue, SOB, decreased activity tolerance    Home Situation  Support Systems: Friend/Neighbor (Pt is her mother's caregiver. She has 2 brothers, but pt does not endorse their support.  Pt endorses support from close friend, [facility])  Patient Expects to be Discharged to[de-identified] Home    EXAMINATION/PRESENTATION/DECISION MAKING:   Critical Behavior:  Neurologic State: Alert  Orientation Level: Oriented X4  Cognition: Appropriate decision making, Appropriate for age attention/concentration, Appropriate safety awareness, Follows commands  Safety/Judgement: Awareness of environment, Decreased insight into deficits, Fall prevention  Hearing: Auditory  Auditory Impairment: None  Range Of Motion:  AROM: Within functional limits         Strength:    Strength: Generally decreased, functional         Tone & Sensation:   Tone: Normal                Coordination:  Coordination: Within functional limits  Vision:      Functional Mobility:  Bed Mobility:  Rolling: Supervision  Supine to Sit: Supervision  Sit to Supine: Supervision  Scooting: Supervision  Transfers:  Sit to Stand: Stand-by assistance (vc for hand placement)  Stand to Sit: Stand-by assistance (vc for hand placement)                       Balance:   Sitting: Intact  Standing: Impaired  Standing - Static: Good; Unsupported  Standing - Dynamic : Good;Constant support  Ambulation/Gait Training:  Distance (ft): 32 Feet (ft)  Assistive Device: Gait belt;Walker, rolling  Ambulation - Level of Assistance: Stand-by assistance (vc for body position in RW)        Gait Abnormalities: Decreased step clearance        Base of Support: Widened     Speed/Maria Luisa: Pace decreased (<100 feet/min)  Step Length: Left shortened;Right shortened        Functional Measure:  Barthel Index:    Bathin  Bladder: 5  Bowels: 10  Groomin  Dressin  Feeding: 10  Mobility: 5  Stairs: 5  Toilet Use: 5  Transfer (Bed to Chair and Back): 10  Total: 60/100       The Barthel ADL Index: Guidelines  1. The index should be used as a record of what a patient does, not as a record of what a patient could do. 2. The main aim is to establish degree of independence from any help, physical or verbal, however minor and for whatever reason.   3. The need for supervision renders the patient not independent. 4. A patient's performance should be established using the best available evidence. Asking the patient, friends/relatives and nurses are the usual sources, but direct observation and common sense are also important. However direct testing is not needed. 5. Usually the patient's performance over the preceding 24-48 hours is important, but occasionally longer periods will be relevant. 6. Middle categories imply that the patient supplies over 50 per cent of the effort. 7. Use of aids to be independent is allowed. Score Interpretation (from [facility] 83)    Independent   60-79 Minimally independent   40-59 Partially dependent   20-39 Very dependent   <20 Totally dependent     -Leobardo Lilly., Barthel DALEXANDRO. (1965). Functional evaluation: the Barthel Index. [address] ([address] [place] (1997). The Barthel activities of daily living index: self-reporting versus actual performance in the old (> or = 75 years). Journal of [address] [place] [id number] 457), [facility], CYNDIE, Nohemy Phillips., Charu Soria. (1999). Measuring the change in disability after inpatient rehabilitation; comparison of the responsiveness of the Barthel Index and Functional [place] Measure. Journal of Neurology, Neurosurgery, and Psychiatry, 66(4), 954-228. Karen Curry, [place].ZACH, NENA Ryan, & Angela Quezada, MKinjalA. (2004) Assessment of post-stroke quality of life in cost-effectiveness studies: The usefulness of the Barthel Index and the EuroQoL-5D.  Quality of Life Research, 13, 427-43          Pain Rating:  None reported    Activity Tolerance:   Good and desaturates with exertion and requires oxygen    After treatment patient left in no apparent distress:   Supine in bed, Call bell within reach, Bed / chair alarm activated, and Side rails x 3    COMMUNICATION/EDUCATION:   The patients plan of care was discussed with: Occupational therapist and Registered nurse.     Fall prevention education was provided and the patient/caregiver indicated understanding., Patient/family have participated as able in goal setting and plan of care. , and Patient/family agree to work toward stated goals and plan of care.     Thank you for this referral.  Chely Fam, PT

## 2023-02-16 NOTE — PROGRESS NOTES
Bedside shift change report given to JORGE Martinez (oncoming nurse) by Beryle Layman, RN (offgoing nurse). Report included the following information SBAR, Kardex, ED Summary, Procedure Summary, Intake/Output, MAR, Recent Results, Med Rec Status, and Cardiac Rhythm Sinus Rhythm . Pulse oximetry assessment   94% at rest on room air (if 88% or less, skip next steps)  87% while ambulating on room air  95% at rest on 1LPM  94% while ambulating on 1LPM        End of Shift Note    Bedside shift change report given to Beryle Layman, RN (oncoming nurse) by Alejandro Mixon RN (offgoing nurse). Report included the following information SBAR, Kardex, ED Summary, Procedure Summary, Intake/Output, MAR, Recent Results, Med Rec Status, and Cardiac Rhythm Sinus Rhythm    Shift worked:  7a-7p     Shift summary and any significant changes:    Weaned pt to 1L,[place]. O2 challenge documented above. Na+ Q6hrs, next draw at 2200. Tolvaptan given X1.       Concerns for physician to address:       Zone phone for oncoming shift:

## 2023-02-16 NOTE — PROGRESS NOTES
Problem: Pressure Injury - Risk of  Goal: *Prevention of pressure injury  Description: Document Kelvin Scale and appropriate interventions in the flowsheet. Outcome: Progressing Towards Goal  Note: Pressure Injury Interventions:  Sensory Interventions: Assess changes in LOC, Assess need for specialty bed, Avoid rigorous massage over bony prominences, Float heels, Keep linens dry and wrinkle-free, Maintain/enhance activity level, Minimize linen layers, Monitor skin under medical devices, Turn and reposition approx. every two hours (pillows and wedges if needed)    Moisture Interventions: Absorbent underpads, Apply protective barrier, creams and emollients, Assess need for specialty bed    Activity Interventions: Assess need for specialty bed, Increase time out of bed    Mobility Interventions: Assess need for specialty bed    Nutrition Interventions: Document food/fluid/supplement intake, Offer support with meals,snacks and hydration    Friction and Shear Interventions: Apply protective barrier, creams and emollients, Feet elevated on foot rest, Foam dressings/transparent film/skin sealants, Lift team/patient mobility team, Lift sheet                Problem: Pressure Injury - Risk of  Goal: *Prevention of pressure injury  Description: Document Kelvin Scale and appropriate interventions in the flowsheet. Outcome: Progressing Towards Goal  Note: Pressure Injury Interventions:  Sensory Interventions: Assess changes in LOC, Assess need for specialty bed, Avoid rigorous massage over bony prominences, Float heels, Keep linens dry and wrinkle-free, Maintain/enhance activity level, Minimize linen layers, Monitor skin under medical devices, Turn and reposition approx.  every two hours (pillows and wedges if needed)    Moisture Interventions: Absorbent underpads, Apply protective barrier, creams and emollients, Assess need for specialty bed    Activity Interventions: Assess need for specialty bed, Increase time out of bed    Mobility Interventions: Assess need for specialty bed    Nutrition Interventions: Document food/fluid/supplement intake, Offer support with meals,snacks and hydration    Friction and Shear Interventions: Apply protective barrier, creams and emollients, Feet elevated on foot rest, Foam dressings/transparent film/skin sealants, Lift team/patient mobility team, Lift sheet                Problem: Falls - Risk of  Goal: *Absence of Falls  Description: Document Caitlin Fall Risk and appropriate interventions in the flowsheet.   Outcome: Progressing Towards Goal  Note: Fall Risk Interventions:            Medication Interventions: Bed/chair exit alarm                   Problem: Risk for Elopement  Goal: Patient will not exit the unit/facility without proper escort  Outcome: Progressing Towards Goal     Problem: Breathing Pattern - Ineffective  Goal: *Absence of hypoxia  Outcome: Progressing Towards Goal     Problem: Chronic Obstructive Pulmonary Disease (COPD)  Goal: *Oxygen saturation during activity within specified parameters  Outcome: Progressing Towards Goal

## 2023-02-16 NOTE — PROGRESS NOTES
Hospitalist Progress Note    NAME: Haley Kent   :  1965   MRN:  [id number]       Assessment / Plan:  PAUL:  TBD  Barriers:  multiple barriers, no insurance, can't afford oxygen    Acute hypoxic respiratory failure, POA  COPD exacerbation, POA  Multiple rib fractures, POA  - EKG on admission with no acute ischemic changes  - CTA with no PE. Acute right ninth rib fracture, multiple bilateral remote rib fractures  - Spirometry as tolerated  - Add DuoNeb, arformoterol/budesonide,  --completed 5 day course of prednisone but she still sounds congested and unable to wean off oxygen. Still on [facility] and she cannot afford oxygen. Will start pulmicort nebs BID, add back prednisone and see if we can wean off oxygen to RA  --add acapella flutter valve QID    Acute on chronic hyponatremia, POA  - Presented with sodium 118, baseline around 126  --improved with tolvaptan  - Nephrology help appreciated  - FR  - Etiology can be related for alcohol abuse and possibly excess water intake although patient is denying. Behaving like SIADH    Suicidal ideation  - Suicide precautions, constant observation  - Will not need to be involuntary committed but can by voluntary committed if patient chooses. She is leaning towards not going to inpatient psych    Alcohol abuse  - Patient reported that she drinks on daily basis  - Watch for alcohol withdrawal syndrome    Sinus tachycardia  --on metoprolol    Nicotine use  - Counseling was provided about smoking cessation  - Nicotine patch    Incidental finding on CTA chest    Compression of multiple vertebral bodies. Partial atelectasis/scarring in the right middle lobe. Partially opacified bronchioles supplying the posterior aspect of the left  lower lobe suggesting mucus plugging and/or bronchitis. Will need close follow-up as an outpatient    18.5 - 24.9 Normal weight / Body mass index is 17.07 kg/m².     Code status: Full  Prophylaxis: Lovenox  Recommended Disposition: TBD     Subjective: Follow up hyponatremia, COPD exacerbation, rib fractures  Patient was seen and examined. She remains congested  Discussed with RN overnight events. Review of Systems:  Symptom Y/N Comments  Symptom Y/N Comments   Fever/Chills n   Chest Pain y Pleuritic   Poor Appetite    Edema     Cough n   Abdominal Pain n    Sputum    Joint Pain     SOB/JARAMILLO n   Pruritis/Rash     Nausea/vomit n   Tolerating PT/OT     Diarrhea    Tolerating Diet y    Constipation    Other       Could NOT obtain due to:          Objective:     VITALS:   Last 24hrs VS reviewed since prior progress note. Most recent are:  Patient Vitals for the past 24 hrs:   Temp Pulse Resp BP SpO2   02/16/23 0855 -- 99 -- 139/81 --   02/16/23 0732 98.5 °F (36.9 °C) 95 20 (!) 149/79 98 %   02/16/23 0721 -- -- -- -- 96 %   02/16/23 0416 -- -- -- -- 97 %   02/16/23 0356 98.2 °F (36.8 °C) 96 20 (!) 147/88 99 %   02/16/23 0154 -- 77 -- -- 99 %   02/15/23 2209 97.4 °F (36.3 °C) 99 20 133/87 99 %   02/15/23 2051 -- -- -- -- 99 %   02/15/23 1945 97.4 °F (36.3 °C) 100 21 (!) 151/82 95 %   02/15/23 1511 98.5 °F (36.9 °C) (!) 102 20 130/76 97 %   02/15/23 1439 -- -- -- -- 100 %   02/15/23 1131 98.1 °F (36.7 °C) 88 20 (!) 144/85 93 %         Intake/Output Summary (Last 24 hours) at 2/16/2023 0900  Last data filed at 2/16/2023 1613  Gross per 24 hour   Intake 422 ml   Output --   Net 422 ml          I had a face to face encounter and independently examined this patient on 2/16/2023, as outlined below:  PHYSICAL EXAM:  General: WD, WN. Alert, cooperative, no acute distress    EENT:  EOMI. Anicteric sclerae. MMM  Resp:  bilaterally, +ve wheezing, no rales. No accessory muscle use  CV:  Regular  rhythm,  No edema  GI:  Soft, Non distended, Non tender. +Bowel sounds  Neurologic:  EOMs intact. No facial asymmetry. No aphasia or slurred speech. Symmetrical strength, Sensation grossly intact. Alert and oriented X 4.     Psych:   Good insight.  Not anxious nor agitated  Skin:  No rashes. No jaundice    Reviewed most current lab test results and cultures  YES  Reviewed most current radiology test results   YES  Review and summation of old records today    NO  Reviewed patient's current orders and MAR    YES  PMH/SH reviewed - no change compared to H&P  ________________________________________________________________________  Care Plan discussed with:    Comments   Patient X    Family      RN X    Care Manager     Consultant                        Multidiciplinary team rounds were held today with , nursing, pharmacist and clinical coordinator. Patient's plan of care was discussed; medications were reviewed and discharge planning was addressed. ________________________________________________________________________        Comments   >50% of visit spent in counseling and coordination of care X    ________________________________________________________________________  Leno Ponce MD     Procedures: see electronic medical records for all procedures/Xrays and details which were not copied into this note but were reviewed prior to creation of Plan. LABS:  I reviewed today's most current labs and imaging studies. Pertinent labs include:  Recent Labs     02/15/23  0258 02/14/23  0506   WBC 6.1 6.7   HGB 10.5* 10.9*   HCT 32.2* 33.5*    222       Recent Labs     02/16/23  0403 02/15/23  2207 02/15/23  1359 02/15/23  0258 02/14/23  0915 02/14/23  0506   * 127* 126* 132*   < > 126*   K 4.4  --   --  4.3  --  4.0   CL 86*  --   --  91*  --  86*   CO2 35*  --   --  36*  --  35*   GLU 91  --   --  83  --  113*   BUN 8  --   --  11  --  13   CREA 0.36*  --   --  0.29*  --  0.32*   CA 9.3  --   --  9.0  --  8.7   MG 1.8  --   --  1.7  --  1.7   PHOS  --   --   --  3.5  --  2.6    < > = values in this interval not displayed.          Signed: Leno Ponce MD

## 2023-02-16 NOTE — PROGRESS NOTES
Problem: Pressure Injury - Risk of  Goal: *Prevention of pressure injury  Description: Document Kelvin Scale and appropriate interventions in the flowsheet. Outcome: Progressing Towards Goal  Note: Pressure Injury Interventions:  Sensory Interventions: Assess changes in LOC, Assess need for specialty bed, Avoid rigorous massage over bony prominences, Float heels, Keep linens dry and wrinkle-free, Maintain/enhance activity level, Minimize linen layers, Monitor skin under medical devices, Turn and reposition approx. every two hours (pillows and wedges if needed)    Moisture Interventions: Absorbent underpads, Apply protective barrier, creams and emollients, Assess need for specialty bed    Activity Interventions: Assess need for specialty bed, Increase time out of bed, Pressure redistribution bed/mattress(bed type)    Mobility Interventions: Assess need for specialty bed, Float heels, HOB 30 degrees or less    Nutrition Interventions: Document food/fluid/supplement intake, Discuss nutritional consult with provider    Friction and Shear Interventions: Apply protective barrier, creams and emollients, HOB 30 degrees or less, Lift sheet, Minimize layers, Lift team/patient mobility team                Problem: Patient Education: Go to Patient Education Activity  Goal: Patient/Family Education  Outcome: Progressing Towards Goal     Problem: Falls - Risk of  Goal: *Absence of Falls  Description: Document Caitlin Fall Risk and appropriate interventions in the flowsheet.   Outcome: Progressing Towards Goal  Note: Fall Risk Interventions:            Medication Interventions: Bed/chair exit alarm                   Problem: Patient Education: Go to Patient Education Activity  Goal: Patient/Family Education  Outcome: Progressing Towards Goal     Problem: Risk for Elopement  Goal: Patient will not exit the unit/facility without proper escort  Outcome: Progressing Towards Goal     Problem: Breathing Pattern - Ineffective  Goal: *Absence of hypoxia  Outcome: Progressing Towards Goal  Goal: *Use of effective breathing techniques  Outcome: Progressing Towards Goal  Goal: *PALLIATIVE CARE:  Alleviation of Dyspnea  Outcome: Progressing Towards Goal     Problem: Patient Education: Go to Patient Education Activity  Goal: Patient/Family Education  Outcome: Progressing Towards Goal     Problem: Patient Education: Go to Patient Education Activity  Goal: Patient/Family Education  Outcome: Progressing Towards Goal     Problem: Patient Education: Go to Patient Education Activity  Goal: Patient/Family Education  Outcome: Progressing Towards Goal     Problem: Pain  Goal: *Control of Pain  Outcome: Progressing Towards Goal  Goal: *PALLIATIVE CARE:  Alleviation of Pain  Outcome: Progressing Towards Goal     Problem: Patient Education: Go to Patient Education Activity  Goal: Patient/Family Education  Outcome: Progressing Towards Goal     Problem: Chronic Obstructive Pulmonary Disease (COPD)  Goal: *Oxygen saturation during activity within specified parameters  Outcome: Progressing Towards Goal  Goal: *Able to remain out of bed as prescribed  Outcome: Progressing Towards Goal  Goal: *Absence of hypoxia  Outcome: Progressing Towards Goal  Goal: *Optimize nutritional status  Outcome: Progressing Towards Goal     Problem: Patient Education: Go to Patient Education Activity  Goal: Patient/Family Education  Outcome: Progressing Towards Goal

## 2023-02-16 NOTE — PROGRESS NOTES
Bedside and Verbal shift change report given to Shiraz Mejia  (oncoming nurse) by Roe Ram RN (offgoing nurse). Report included the following information SBAR, Kardex, Intake/Output, MAR, Accordion, and Cardiac Rhythm SINUS TACHY . CIWA 0. Suicidide screen completed      0400: Pt requesting breathing tx. Resp. Called to bedside     End of Shift Note    Bedside shift change report given to RONI RN (oncoming nurse) by Jeffry Lira RN (offgoing nurse). Report included the following information SBAR, Kardex, Intake/Output, MAR, Accordion, and Cardiac Rhythm NSR    Shift worked:  NIGHT     Shift summary and any significant changes:    UNEVENTFUL SHIFT      Concerns for physician to address:  NONE     Zone phone for oncoming shift:  XXXX       Activity:  Activity Level: Up with Assistance  Number times ambulated in hallways past shift: 0  Number of times OOB to chair past shift: 0    Cardiac:   Cardiac Monitoring: Yes      Cardiac Rhythm: Sinus Rhythm    Access:  Current line(s): PIV     Genitourinary:   Urinary status: voiding    Respiratory:   O2 Device: Nasal cannula  Chronic home O2 use?: NO  Incentive spirometer at bedside: YES  Actual Volume (ml): 250 ml    GI:  Last Bowel Movement Date: 02/10/23  Current diet:  DIET ONE TIME MESSAGE  ADULT ORAL NUTRITION SUPPLEMENT Breakfast, Dinner; Standard High Calorie/High Protein  ADULT DIET Regular; 1200 ml; Safety Tray; Safety Tray (Disposables)  Passing flatus: YES  Tolerating current diet: YES       Pain Management:   Patient states pain is manageable on current regimen: YES    Skin:  Kelvin Score: 20  Interventions: increase time out of bed    Patient Safety:  Fall Score:  Total Score: 1  Interventions: bed/chair alarm, gripper socks, and pt to call before getting OOB       Length of Stay:  Expected LOS: 3d 12h  Actual LOS: [address], RN

## 2023-02-17 ENCOUNTER — APPOINTMENT (OUTPATIENT)
Dept: GENERAL RADIOLOGY | Age: 58
DRG: 190 | End: 2023-02-17
Attending: PHYSICIAN ASSISTANT

## 2023-02-17 LAB
ANION GAP SERPL CALC-SCNC: 6 MMOL/L (ref 5–15)
BUN SERPL-MCNC: 12 MG/DL (ref 6–20)
BUN/CREAT SERPL: 32 (ref 12–20)
CALCIUM SERPL-MCNC: 9 MG/DL (ref 8.5–10.1)
CHLORIDE SERPL-SCNC: 91 MMOL/L (ref 97–108)
CO2 SERPL-SCNC: 33 MMOL/L (ref 21–32)
CREAT SERPL-MCNC: 0.38 MG/DL (ref 0.55–1.02)
GLUCOSE SERPL-MCNC: 93 MG/DL (ref 65–100)
MAGNESIUM SERPL-MCNC: 1.9 MG/DL (ref 1.6–2.4)
POTASSIUM SERPL-SCNC: 4 MMOL/L (ref 3.5–5.1)
SODIUM SERPL-SCNC: 129 MMOL/L (ref 136–145)
SODIUM SERPL-SCNC: 130 MMOL/L (ref 136–145)

## 2023-02-17 PROCEDURE — 74011000250 HC RX REV CODE- 250: Performed by: NURSE PRACTITIONER

## 2023-02-17 PROCEDURE — 74011250636 HC RX REV CODE- 250/636: Performed by: INTERNAL MEDICINE

## 2023-02-17 PROCEDURE — 74011250637 HC RX REV CODE- 250/637: Performed by: INTERNAL MEDICINE

## 2023-02-17 PROCEDURE — 74011636637 HC RX REV CODE- 636/637: Performed by: INTERNAL MEDICINE

## 2023-02-17 PROCEDURE — 80048 BASIC METABOLIC PNL TOTAL CA: CPT

## 2023-02-17 PROCEDURE — 74011000250 HC RX REV CODE- 250: Performed by: INTERNAL MEDICINE

## 2023-02-17 PROCEDURE — 94640 AIRWAY INHALATION TREATMENT: CPT

## 2023-02-17 PROCEDURE — 36415 COLL VENOUS BLD VENIPUNCTURE: CPT

## 2023-02-17 PROCEDURE — 65270000046 HC RM TELEMETRY

## 2023-02-17 PROCEDURE — 94667 MNPJ CHEST WALL 1ST: CPT

## 2023-02-17 PROCEDURE — 83735 ASSAY OF MAGNESIUM: CPT

## 2023-02-17 PROCEDURE — 84295 ASSAY OF SERUM SODIUM: CPT

## 2023-02-17 PROCEDURE — 71045 X-RAY EXAM CHEST 1 VIEW: CPT

## 2023-02-17 PROCEDURE — 97116 GAIT TRAINING THERAPY: CPT

## 2023-02-17 PROCEDURE — 74011250637 HC RX REV CODE- 250/637: Performed by: NURSE PRACTITIONER

## 2023-02-17 RX ADMIN — METOPROLOL TARTRATE 25 MG: 25 TABLET, FILM COATED ORAL at 08:35

## 2023-02-17 RX ADMIN — ENOXAPARIN SODIUM 30 MG: 100 INJECTION SUBCUTANEOUS at 08:35

## 2023-02-17 RX ADMIN — Medication 1000 MG: at 17:08

## 2023-02-17 RX ADMIN — LEVALBUTEROL HYDROCHLORIDE 1.25 MG: 1.25 SOLUTION RESPIRATORY (INHALATION) at 19:25

## 2023-02-17 RX ADMIN — LEVALBUTEROL HYDROCHLORIDE 1.25 MG: 1.25 SOLUTION RESPIRATORY (INHALATION) at 01:04

## 2023-02-17 RX ADMIN — CASTOR OIL AND BALSAM, PERU: 788; 87 OINTMENT TOPICAL at 08:39

## 2023-02-17 RX ADMIN — SODIUM CHLORIDE, PRESERVATIVE FREE 10 ML: 5 INJECTION INTRAVENOUS at 14:18

## 2023-02-17 RX ADMIN — THIAMINE HCL TAB 100 MG 100 MG: 100 TAB at 08:35

## 2023-02-17 RX ADMIN — LEVALBUTEROL HYDROCHLORIDE 1.25 MG: 1.25 SOLUTION RESPIRATORY (INHALATION) at 07:12

## 2023-02-17 RX ADMIN — SODIUM CHLORIDE, PRESERVATIVE FREE 10 ML: 5 INJECTION INTRAVENOUS at 01:18

## 2023-02-17 RX ADMIN — GUAIFENESIN 1200 MG: 600 TABLET, EXTENDED RELEASE ORAL at 08:35

## 2023-02-17 RX ADMIN — CASTOR OIL AND BALSAM, PERU: 788; 87 OINTMENT TOPICAL at 20:29

## 2023-02-17 RX ADMIN — THERA TABS 1 TABLET: TAB at 08:35

## 2023-02-17 RX ADMIN — GUAIFENESIN 1200 MG: 600 TABLET, EXTENDED RELEASE ORAL at 17:08

## 2023-02-17 RX ADMIN — BUDESONIDE INHALATION 500 MCG: 0.5 SUSPENSION RESPIRATORY (INHALATION) at 07:12

## 2023-02-17 RX ADMIN — SODIUM CHLORIDE, PRESERVATIVE FREE 10 ML: 5 INJECTION INTRAVENOUS at 05:46

## 2023-02-17 RX ADMIN — BUDESONIDE INHALATION 500 MCG: 0.5 SUSPENSION RESPIRATORY (INHALATION) at 19:26

## 2023-02-17 RX ADMIN — SODIUM CHLORIDE, PRESERVATIVE FREE 10 ML: 5 INJECTION INTRAVENOUS at 20:27

## 2023-02-17 RX ADMIN — FOLIC ACID 1 MG: 1 TABLET ORAL at 08:35

## 2023-02-17 RX ADMIN — METOPROLOL TARTRATE 25 MG: 25 TABLET, FILM COATED ORAL at 20:27

## 2023-02-17 RX ADMIN — Medication 1000 MG: at 08:35

## 2023-02-17 RX ADMIN — LEVALBUTEROL HYDROCHLORIDE 1.25 MG: 1.25 SOLUTION RESPIRATORY (INHALATION) at 13:17

## 2023-02-17 RX ADMIN — PREDNISONE 40 MG: 20 TABLET ORAL at 08:35

## 2023-02-17 NOTE — PROGRESS NOTES
1900 Bedside and Verbal shift change report given to Shiraz Mejia (oncoming nurse) by Maryann Byrne RN (offgoing nurse). Report included the following information SBAR, Kardex, Intake/Output, MAR, Accordion, and Cardiac Rhythm SINUS TACHY  .     0100: PT requesting breathing tx     0110: RT at bedside    0530: Xray at bedside     End of Shift Note    Bedside shift change report given to [facility] [address] RN (oncoming nurse) by Kendy Christie RN (offgoing nurse). Report included the following information SBAR, Kardex, Intake/Output, MAR, Accordion, and Recent Results    Shift worked:  NIGHT     Shift summary and any significant changes:    *Next Sodium draw @11am  *weaned to 1L O2  *Pt requested both flu + covid shot upon D/C       Concerns for physician to address:  none     Zone phone for oncoming shift:  xxxx       Activity:  Activity Level: Up with Assistance  Number times ambulated in hallways past shift: 0  Number of times OOB to chair past shift: 0    Cardiac:   Cardiac Monitoring: Yes      Cardiac Rhythm: Sinus Rhythm    Access:  Current line(s): PIV     Genitourinary:   Urinary status: voiding    Respiratory:   O2 Device: Nasal cannula  Chronic home O2 use?: NO  Incentive spirometer at bedside: YES  Actual Volume (ml): 250 ml    GI:  Last Bowel Movement Date: 02/15/23  Current diet:  DIET ONE TIME MESSAGE  ADULT ORAL NUTRITION SUPPLEMENT Breakfast, Dinner; Standard High Calorie/High Protein  ADULT DIET Regular; 1200 ml; Safety Tray; Safety Tray (Disposables)  Passing flatus: YES  Tolerating current diet: YES       Pain Management:   Patient states pain is manageable on current regimen: YES    Skin:  Kelvin Score: 19  Interventions: speciality bed and increase time out of bed    Patient Safety:  Fall Score:  Total Score: 1  Interventions: bed/chair alarm, gripper socks, and pt to call before getting OOB       Length of Stay:  Expected LOS: 3d 12h  Actual LOS: [address]

## 2023-02-17 NOTE — PROGRESS NOTES
Problem: Pressure Injury - Risk of  Goal: *Prevention of pressure injury  Description: Document Kelvin Scale and appropriate interventions in the flowsheet. Outcome: Progressing Towards Goal  Note: Pressure Injury Interventions:  Sensory Interventions: Assess changes in LOC, Assess need for specialty bed, Avoid rigorous massage over bony prominences, Float heels, Keep linens dry and wrinkle-free, Maintain/enhance activity level, Minimize linen layers, Monitor skin under medical devices, Turn and reposition approx. every two hours (pillows and wedges if needed)    Moisture Interventions: Absorbent underpads, Apply protective barrier, creams and emollients, Assess need for specialty bed    Activity Interventions: Assess need for specialty bed, Increase time out of bed, Pressure redistribution bed/mattress(bed type)    Mobility Interventions: Assess need for specialty bed, Float heels, HOB 30 degrees or less    Nutrition Interventions: Document food/fluid/supplement intake, Discuss nutritional consult with provider    Friction and Shear Interventions: Apply protective barrier, creams and emollients, HOB 30 degrees or less, Lift sheet, Minimize layers, Lift team/patient mobility team                Problem: Falls - Risk of  Goal: *Absence of Falls  Description: Document Caitlin Fall Risk and appropriate interventions in the flowsheet.   Outcome: Progressing Towards Goal  Note: Fall Risk Interventions:            Medication Interventions: Bed/chair exit alarm                   Problem: Patient Education: Go to Patient Education Activity  Goal: Patient/Family Education  Outcome: Progressing Towards Goal     Problem: Risk for Elopement  Goal: Patient will not exit the unit/facility without proper escort  Outcome: Progressing Towards Goal     Problem: Breathing Pattern - Ineffective  Goal: *Absence of hypoxia  Outcome: Progressing Towards Goal

## 2023-02-17 NOTE — PROGRESS NOTES
Physical Therapy note    Chart reviewed in preparation for PT treatment session. Upon entering room patient stated she had just walked with the nurse, Samaria Martinez declined to get out of bed again, but agreeable to walk later this afternoon. Will defer this AM and try later this afternoon if able.     Randy Mayes, PT, DPT

## 2023-02-17 NOTE — PROGRESS NOTES
Transition of Care Plan:     RUR: 11%   Disposition: Return to community- brother staying at [address] [facility] address to help with their mother     Will need script for OP/PT with dx and also meds sent to cost out to pharmacy prior to discharge- get meds sent to [address] [place] [address] Road to cost out meds - patient has a a jet nebulizer at home      Barrier: Patient is uninsured - likely needs cost out for meds,  - wean O2 or need to set up payment for home O2 via credit card or hospital contract        If SNF or IPR: Date FOC offered:  Date FOC received:  Date authorization started with reference number:  Date authorization received and expires:  Accepting facility:  Follow up appointments: [address] [facility]- likely [facility] on [address]  DME needed:rolling walker- in process of weaning O2  Transportation at Discharge: Brother or friendMargenydia Cardona or means to access home:   brother     IM Medicare Letter: n/a  Is patient a [place] and connected with the [address] [facility]? If yes, was [employer] transfer form completed and VA notified? Caregiver Contact:      Ismael Leonakecia mother [phone]  Abrahan Luigi friend [phone]  Discharge Caregiver contacted prior to discharge?  Per patient  Care Conference needed?:  no    CM met with patient this afternoon - patient plans to follow-up with [facility] on [address] [facility]. - has a rolling walker in her room - has a jet nebulizer at home - is agreeable for meds to be sent to [facility] upon discharge for meds to be cost out for affordability - patient has no income - was living with her mother who is in a facility for rehab at this time - patient provided CM with husbands sister contact info Madyson Cervantes) 's name is Rik Restrepo but patient and he have been  (not legally) for over 10 years- CM sent this information to [employer] to see if they could assist with Medicaid screening again - CM also provided patient with info to apply for SSDI and contact number for DSS as well - previously given locations for OP PT/OT - friend Sebas Emmanuel to provide transportation - explained cost of O2 for OOP and that patient would need a credit card for payment - Monthly rental of concentrator is $120 and each tank is $10. CM will continue to follow.  ABIGAIL Bucio

## 2023-02-17 NOTE — PROGRESS NOTES
Problem: Mobility Impaired (Adult and Pediatric)  Goal: *Acute Goals and Plan of Care (Insert Text)  Description: FUNCTIONAL STATUS PRIOR TO ADMISSION: Patient was independent and active without use of DME.    HOME SUPPORT PRIOR TO ADMISSION: Per chart patient lived with mother. Possibly caregiver for mother    Physical Therapy Goals  Revised 2/16/2023  1. Patient will move from supine to sit and sit to supine , scoot up and down, and roll side to side in bed with independence within 7 day(s). 2.  Patient will transfer from bed to chair and chair to bed with modified independence using the least restrictive device within 7 day(s). 3.  Patient will perform sit to stand with modified independence within 7 day(s). 4.  Patient will ambulate with modified independence for 50 feet with the least restrictive device within 7 day(s). 5.  Patient will ascend/descend 7 stairs with handrail(s) with modified independence within 7 day(s). Initiated 2/8/2023  1. Patient will move from supine to sit and sit to supine  in bed with supervision within 7 day(s). 2.  Patient will transfer from bed to chair and chair to bed with CGA using the least restrictive device within 7 day(s). MET  3. Patient will perform sit to stand with CGA within 7 day(s). MET  4. Patient will ambulate with CGA for 250 feet with the least restrictive device within 7 day(s). Outcome: Progressing Towards Goal    PHYSICAL THERAPY TREATMENT  Patient: Jimena Omer (58 y.o. female)  Date: 2/17/2023  Diagnosis: Acute respiratory distress [R06.03]  Hyponatremia [E87.1] <principal problem not specified>      Precautions: Fall, Bed Alarm  Chart, physical therapy assessment, plan of care and goals were reviewed. ASSESSMENT  Patient continues with skilled PT services and is progressing towards goals. Patient has progressed to supervision assist for sit<>stand transfers, verbal cuing only to push up from bed.  She ambulated 15' x 2 with seated rest break in between due to HR increased to 129 bpm. Patient remained on 1L O2 while walking, sats remained at 94%. PT provided patient education to relax UE support (patient had tendency to WB heavily through her arms) to improve her energy conservation and help her not get short of breath as quickly. Patient was left supine in bed, call bell within reach, no complaints. Current Level of Function Impacting Discharge (mobility/balance): standby assist for ambulating with RW    Other factors to consider for discharge: currently requires O2         PLAN :  Patient continues to benefit from skilled intervention to address the above impairments. Continue treatment per established plan of care. to address goals. Recommendation for discharge: (in order for the patient to meet his/her long term goals)  Outpatient physical therapy follow up recommended for gait training, endurance, balance    This discharge recommendation:  Has been made in collaboration with the attending provider and/or case management    IF patient discharges home will need the following DME: rolling walker       SUBJECTIVE:   Patient stated Did I do good? Anton Doctor    OBJECTIVE DATA SUMMARY:   Critical Behavior:  Neurologic State: Alert  Orientation Level: Oriented X4  Cognition: Follows commands  Safety/Judgement: Awareness of environment  Functional Mobility Training:  Bed Mobility:     Supine to Sit: Supervision  Sit to Supine: Supervision  Transfers:  Sit to Stand: Supervision  Stand to Sit: Supervision    Balance:  Sitting: Intact  Standing: Intact; With support  Standing - Static: Good  Standing - Dynamic : Good;Constant support  Ambulation/Gait Training:  Distance (ft): 30 Feet (ft) (15x2)  Assistive Device: Gait belt;Walker, rolling  Ambulation - Level of Assistance: Stand-by assistance  Gait Abnormalities: Decreased step clearance  Base of Support: Widened     Speed/Maria Luisa: Pace decreased (<100 feet/min)  Step Length: Right shortened;Left shortened      Therapeutic Exercises:   none  Pain Rating:  none    Activity Tolerance:   Fair    After treatment patient left in no apparent distress:   Supine in bed and Call bell within reach    COMMUNICATION/COLLABORATION:   The patients plan of care was discussed with: Registered nurse.      Ninoska Galindo, PT   Time Calculation: 15 mins

## 2023-02-17 NOTE — PROGRESS NOTES
[facility]  Progress Note        NAME: Panda Abdi       :  1965       MRN:  [id number]     Date/Time: 2023    Risk of deterioration: medium       Assessment:    Plan:  Acute on chronic hyponatremia-behaving like siadh  ETOH  HTN  Psych  Mucous plugging/sob Sodium has been improving with tolvaptan. Better today. Hold tolvaptan      Records from [facility] reviewed-has been seen by nephrology there in past-working diagnosis etoh/increased water intake             Subjective:     Chief Complaint:  When can I go home? \"  No N/V. No dyspnea. No pain. Objective:     VITALS:   Last 24hrs VS reviewed since prior progress note.  Most recent are:  Visit Vitals  BP (!) 145/84   Pulse (!) 104   Temp 98 °F (36.7 °C)   Resp 18   Ht 5' 3\" (1.6 m)   Wt 43.7 kg (96 lb 5.5 oz)   SpO2 95%   BMI 17.07 kg/m²     SpO2 Readings from Last 6 Encounters:   23 95%    O2 Flow Rate (L/min): 1 l/min     Intake/Output Summary (Last 24 hours) at 2023 1058  Last data filed at 2023 1056  Gross per 24 hour   Intake --   Output 150 ml   Net -150 ml          Telemetry Reviewed       PHYSICAL EXAM:    NAD  No edema     Lab Data Reviewed: (see below)    Medications Reviewed: (see below)    PMH/SH reviewed - no change compared to H&P________________    Attending Physician: Norris Ha MD     ____________________________________________________  MEDICATIONS:  Current Facility-Administered Medications   Medication Dose Route Frequency    influenza vaccine  (6 mos+)(PF) (FLUARIX/FLULAVAL/FLUZONE QUAD) injection 0.5 mL  1 Each IntraMUSCular PRIOR TO DISCHARGE    predniSONE (DELTASONE) tablet 40 mg  40 mg Oral DAILY WITH BREAKFAST    budesonide (PULMICORT) 500 mcg/2 ml nebulizer suspension  500 mcg Nebulization BID RT    ALPRAZolam (XANAX) tablet 0.5 mg  0.5 mg Oral QID PRN    butalbital-acetaminophen-caffeine (FIORICET, ESGIC) -40 mg per tablet 1 Tablet  1 Tablet Oral Q6H PRN    guaiFENesin ER (MUCINEX) tablet 1,200 mg  1,200 mg Oral BID    levalbuterol (XOPENEX) nebulizer soln 1.25 mg/3 mL  1.25 mg Nebulization Q6H RT    metoprolol tartrate (LOPRESSOR) tablet 25 mg  25 mg Oral Q12H    enoxaparin (LOVENOX) injection 30 mg  30 mg SubCUTAneous DAILY    sodium chloride (OCEAN) 0.65 % nasal squeeze bottle 2 Spray  2 Spray Both Nostrils Q2H PRN    sodium chloride soluble tablet 1,000 mg  1 g Oral BID    therapeutic multivitamin (THERAGRAN) tablet 1 Tablet  1 Tablet Oral DAILY    folic acid (FOLVITE) tablet 1 mg  1 mg Oral DAILY    thiamine mononitrate (B-1) tablet 100 mg  100 mg Oral DAILY    sodium chloride (NS) flush 5-40 mL  5-40 mL IntraVENous Q8H    sodium chloride (NS) flush 5-40 mL  5-40 mL IntraVENous PRN    acetaminophen (TYLENOL) tablet 650 mg  650 mg Oral Q6H PRN    Or    acetaminophen (TYLENOL) suppository 650 mg  650 mg Rectal Q6H PRN    polyethylene glycol (MIRALAX) packet 17 g  17 g Oral DAILY PRN    ondansetron (ZOFRAN ODT) tablet 4 mg  4 mg Oral Q8H PRN    Or    ondansetron (ZOFRAN) injection 4 mg  4 mg IntraVENous Q6H PRN    ipratropium (ATROVENT) 0.02 % nebulizer solution 0.5 mg  0.5 mg Nebulization Q4H PRN    LORazepam (ATIVAN) injection 1 mg  1 mg IntraVENous Q2H PRN    LORazepam (ATIVAN) injection 2 mg  2 mg IntraVENous Q2H PRN    nicotine (NICODERM CQ) 21 mg/24 hr patch 1 Patch  1 Patch TransDERmal DAILY    balsam peru-castor oiL (VENELEX) ointment   Topical BID        LABS:  Recent Labs     02/15/23  0258   WBC 6.1   HGB 10.5*   HCT 32.2*          Recent Labs     02/17/23  0500 02/16/23  2253 02/16/23  1637 02/16/23  0950 02/16/23  0403 02/15/23  1359 02/15/23  0258   *  129* 131* 126*   < > 128*   < > 132*   K 4.0  --   --   --  4.4  --  4.3   CL 91*  --   --   --  86*  --  91*   CO2 33*  --   --   --  35*  --  36*   BUN 12  --   --   --  8  --  11   CREA 0.38*  --   --   --  0.36*  --  0.29*   GLU 93  --   --   --  91  --  83   CA 9.0  --   --   --  9.3  --  9.0   MG 1.9 --   --   --  1.8  --  1.7   PHOS  --   --   --   --   --   --  3.5    < > = values in this interval not displayed. No results for input(s): ALT, AP, TBIL, TBILI, TP, ALB, GLOB, GGT, AML, LPSE in the last 72 hours. No lab exists for component: SGOT, GPT, AMYP, HLPSE    No results for input(s): INR, PTP, APTT, INREXT, INREXT in the last 72 hours. No results for input(s): FE, TIBC, PSAT, FERR in the last 72 hours. No results for input(s): PH, PCO2, PO2 in the last 72 hours. No results for input(s): CPK, CKNDX, TROIQ in the last 72 hours.     No lab exists for component: CPKMB  No results found for: Eston Cool

## 2023-02-17 NOTE — PROGRESS NOTES
Hospitalist Progress Note    NAME: Siena Marcano   :  1965   MRN:  [id number]       Assessment / Plan:  Acute hypoxic respiratory failure, POA--prior hospitalist notes pt unable to afford O2  COPD exacerbation, POA  Multiple rib fractures, POA  -EKG on admission without no acute ischemic changes  -CTA with no PE. Acute right ninth rib fracture, multiple bilateral remote rib fractures  -breathing treatments  -steroids with goal to wean off    Acute on chronic hyponatremia, POA  -Presented with sodium 118, baseline around 126  -Nephrology help appreciated, was given tolvaptan  -trend with labs    Suicidal ideation noted by prior hospitalist  - Suicide precautions, constant observation  - Will not need to be involuntary committed but can by voluntary committed if patient chooses. She is leaning towards not going to inpatient psych noted by prior hospitalist  -monitor, pt currently is calm and denies any si/hi/hallucinations  -pscyh consulted    Alcohol abuse  -no withdrawal symptoms observed  -resume to monitor    Sinus tachycardia  -asymptomatic  -improved control, on metoprolol    Nicotine use  -counseling was provided about smoking cessation by prior hospitalist  -nicotine patch    Incidental finding on CTA chest    Compression of multiple vertebral bodies. Partial atelectasis/scarring in the right middle lobe. Partially opacified bronchioles supplying the posterior aspect of the left  lower lobe suggesting mucus plugging and/or bronchitis.     Will need close follow-up as an outpatient      Code status: Full  Prophylaxis: Lovenox  Recommended Disposition:  TBD     Subjective:   Pt reports she moved from      Review of Systems:  Symptom Y/N Comments  Symptom Y/N Comments   Fever/Chills n   Chest Pain n    Poor Appetite    Edema n    Cough n   Abdominal Pain n    Sputum    Joint Pain     SOB/JARAMILLO n   Pruritis/Rash     Nausea/vomit n   Tolerating PT/OT     Diarrhea n   Tolerating Diet y Constipation n   Other       Could NOT obtain due to:          Objective:     VITALS:   Last 24hrs VS reviewed since prior progress note. Most recent are:  Patient Vitals for the past 24 hrs:   Temp Pulse Resp BP SpO2   02/17/23 1046 98.2 °F (36.8 °C) 91 -- (!) 140/82 95 %   02/17/23 0739 98 °F (36.7 °C) (!) 104 18 (!) 145/84 95 %   02/17/23 0713 -- -- -- -- 98 %   02/17/23 0254 97.7 °F (36.5 °C) 97 18 135/81 96 %   02/17/23 0105 -- -- -- -- 95 %   02/16/23 2249 98 °F (36.7 °C) (!) 102 20 135/73 90 %   02/16/23 2246 -- (!) 115 -- 135/73 --   02/16/23 1937 -- -- -- -- 97 %   02/16/23 1933 98 °F (36.7 °C) (!) 101 20 130/73 97 %   02/16/23 1436 98.8 °F (37.1 °C) (!) 102 -- 132/77 92 %   02/16/23 1340 -- -- -- -- 99 %         Intake/Output Summary (Last 24 hours) at 2/17/2023 1243  Last data filed at 2/17/2023 1056  Gross per 24 hour   Intake --   Output 150 ml   Net -150 ml            I had a face to face encounter and independently examined this patient on 2/17/2023, as outlined below:  PHYSICAL EXAM:  General: No acute distress    EENT:  Anicteric sclerae. MMM  Resp:  Nonlabored,  No accessory muscle use  CV:  Regular  rhythm,  No edema  GI:  Soft, Non distended, Non tender. +Bowel sounds  Neurologic:  No facial asymmetry. No aphasia or slurred speech. Symmetrical strength, Sensation grossly intact. Alert and oriented X 4.     Psych:   Good insight. Not anxious nor agitated  Skin:  No rashes.   No jaundice    Reviewed most current lab test results and cultures  YES  Reviewed most current radiology test results   YES  Review and summation of old records today    NO  Reviewed patient's current orders and MAR    YES  PMH/SH reviewed - no change compared to H&P  ________________________________________________________________________  Care Plan discussed with:    Comments   Patient X    Family      RN     Care Manager     Consultant                        Multidiciplinary team rounds were held today with , nursing, pharmacist and clinical coordinator. Patient's plan of care was discussed; medications were reviewed and discharge planning was addressed. ________________________________________________________________________        Comments   >50% of visit spent in counseling and coordination of care X    ________________________________________________________________________  Katiana Tafoya MD     Procedures: see electronic medical records for all procedures/Xrays and details which were not copied into this note but were reviewed prior to creation of Plan. LABS:  I reviewed today's most current labs and imaging studies. Pertinent labs include:  Recent Labs     02/15/23  0258   WBC 6.1   HGB 10.5*   HCT 32.2*          Recent Labs     02/17/23  0500 02/16/23  2253 02/16/23  1637 02/16/23  0950 02/16/23  0403 02/15/23  1359 02/15/23  0258   *  129* 131* 126*   < > 128*   < > 132*   K 4.0  --   --   --  4.4  --  4.3   CL 91*  --   --   --  86*  --  91*   CO2 33*  --   --   --  35*  --  36*   GLU 93  --   --   --  91  --  83   BUN 12  --   --   --  8  --  11   CREA 0.38*  --   --   --  0.36*  --  0.29*   CA 9.0  --   --   --  9.3  --  9.0   MG 1.9  --   --   --  1.8  --  1.7   PHOS  --   --   --   --   --   --  3.5    < > = values in this interval not displayed.          Signed: Katiana Tafoya MD

## 2023-02-17 NOTE — PROGRESS NOTES
Home Oxygen Test  Date of test: 2/17  Time of test: 1110    Sa02 94 % on room air AT REST. Sa02 86 % on room air DURING AMBULATION. Sa02 93 % on 2 Liters DURING AMBULATION. Sa02 95 % on 1 Liters AT REST/AFTER AMBULATION.

## 2023-02-17 NOTE — PROGRESS NOTES
End of Shift Note    Bedside shift change report given to RN (oncoming nurse) by Marta Gould (offgoing nurse).   Report included the following information SBAR, Kardex, Procedure Summary, Intake/Output, MAR, and Recent Results    Shift worked:  7-7pm     Shift summary and any significant changes:     No significant changes     Concerns for physician to address:       Zone phone for oncoming shift:              Marta Gould

## 2023-02-18 LAB
ANION GAP SERPL CALC-SCNC: 5 MMOL/L (ref 5–15)
BUN SERPL-MCNC: 19 MG/DL (ref 6–20)
BUN/CREAT SERPL: 59 (ref 12–20)
CALCIUM SERPL-MCNC: 8.9 MG/DL (ref 8.5–10.1)
CHLORIDE SERPL-SCNC: 92 MMOL/L (ref 97–108)
CO2 SERPL-SCNC: 34 MMOL/L (ref 21–32)
CREAT SERPL-MCNC: 0.32 MG/DL (ref 0.55–1.02)
GLUCOSE SERPL-MCNC: 88 MG/DL (ref 65–100)
POTASSIUM SERPL-SCNC: 4.2 MMOL/L (ref 3.5–5.1)
SODIUM SERPL-SCNC: 131 MMOL/L (ref 136–145)

## 2023-02-18 PROCEDURE — 74011000250 HC RX REV CODE- 250: Performed by: NURSE PRACTITIONER

## 2023-02-18 PROCEDURE — 74011250636 HC RX REV CODE- 250/636: Performed by: INTERNAL MEDICINE

## 2023-02-18 PROCEDURE — 80048 BASIC METABOLIC PNL TOTAL CA: CPT

## 2023-02-18 PROCEDURE — 74011250637 HC RX REV CODE- 250/637: Performed by: INTERNAL MEDICINE

## 2023-02-18 PROCEDURE — 74011636637 HC RX REV CODE- 636/637: Performed by: INTERNAL MEDICINE

## 2023-02-18 PROCEDURE — 74011250637 HC RX REV CODE- 250/637: Performed by: NURSE PRACTITIONER

## 2023-02-18 PROCEDURE — 77010033678 HC OXYGEN DAILY

## 2023-02-18 PROCEDURE — 94640 AIRWAY INHALATION TREATMENT: CPT

## 2023-02-18 PROCEDURE — 74011000250 HC RX REV CODE- 250: Performed by: INTERNAL MEDICINE

## 2023-02-18 PROCEDURE — 36415 COLL VENOUS BLD VENIPUNCTURE: CPT

## 2023-02-18 PROCEDURE — 65270000046 HC RM TELEMETRY

## 2023-02-18 RX ADMIN — METOPROLOL TARTRATE 25 MG: 25 TABLET, FILM COATED ORAL at 21:34

## 2023-02-18 RX ADMIN — LEVALBUTEROL HYDROCHLORIDE 1.25 MG: 1.25 SOLUTION RESPIRATORY (INHALATION) at 19:05

## 2023-02-18 RX ADMIN — FOLIC ACID 1 MG: 1 TABLET ORAL at 08:27

## 2023-02-18 RX ADMIN — ENOXAPARIN SODIUM 30 MG: 100 INJECTION SUBCUTANEOUS at 08:27

## 2023-02-18 RX ADMIN — THERA TABS 1 TABLET: TAB at 08:26

## 2023-02-18 RX ADMIN — LEVALBUTEROL HYDROCHLORIDE 1.25 MG: 1.25 SOLUTION RESPIRATORY (INHALATION) at 07:31

## 2023-02-18 RX ADMIN — GUAIFENESIN 1200 MG: 600 TABLET, EXTENDED RELEASE ORAL at 18:07

## 2023-02-18 RX ADMIN — BUDESONIDE INHALATION 500 MCG: 0.5 SUSPENSION RESPIRATORY (INHALATION) at 19:05

## 2023-02-18 RX ADMIN — PREDNISONE 40 MG: 20 TABLET ORAL at 08:27

## 2023-02-18 RX ADMIN — Medication 1000 MG: at 08:26

## 2023-02-18 RX ADMIN — CASTOR OIL AND BALSAM, PERU: 788; 87 OINTMENT TOPICAL at 08:30

## 2023-02-18 RX ADMIN — BUTALBITAL, ACETAMINOPHEN, AND CAFFEINE 1 TABLET: 50; 325; 40 TABLET ORAL at 21:34

## 2023-02-18 RX ADMIN — SODIUM CHLORIDE, PRESERVATIVE FREE 10 ML: 5 INJECTION INTRAVENOUS at 16:14

## 2023-02-18 RX ADMIN — THIAMINE HCL TAB 100 MG 100 MG: 100 TAB at 08:27

## 2023-02-18 RX ADMIN — LEVALBUTEROL HYDROCHLORIDE 1.25 MG: 1.25 SOLUTION RESPIRATORY (INHALATION) at 14:21

## 2023-02-18 RX ADMIN — SODIUM CHLORIDE, PRESERVATIVE FREE 10 ML: 5 INJECTION INTRAVENOUS at 21:34

## 2023-02-18 RX ADMIN — ALPRAZOLAM 0.5 MG: 0.5 TABLET ORAL at 01:19

## 2023-02-18 RX ADMIN — GUAIFENESIN 1200 MG: 600 TABLET, EXTENDED RELEASE ORAL at 08:26

## 2023-02-18 RX ADMIN — METOPROLOL TARTRATE 25 MG: 25 TABLET, FILM COATED ORAL at 08:27

## 2023-02-18 RX ADMIN — Medication 1000 MG: at 18:07

## 2023-02-18 RX ADMIN — LEVALBUTEROL HYDROCHLORIDE 1.25 MG: 1.25 SOLUTION RESPIRATORY (INHALATION) at 01:06

## 2023-02-18 RX ADMIN — SODIUM CHLORIDE, PRESERVATIVE FREE 10 ML: 5 INJECTION INTRAVENOUS at 07:02

## 2023-02-18 RX ADMIN — CASTOR OIL AND BALSAM, PERU: 788; 87 OINTMENT TOPICAL at 21:35

## 2023-02-18 RX ADMIN — BUDESONIDE INHALATION 500 MCG: 0.5 SUSPENSION RESPIRATORY (INHALATION) at 07:31

## 2023-02-18 NOTE — PROGRESS NOTES
Problem: Pressure Injury - Risk of  Goal: *Prevention of pressure injury  Description: Document Kelvin Scale and appropriate interventions in the flowsheet. Outcome: Progressing Towards Goal  Note: Pressure Injury Interventions:  Sensory Interventions: Assess changes in LOC    Moisture Interventions: Absorbent underpads    Activity Interventions: Increase time out of bed, Chair cushion    Mobility Interventions: Chair cushion, Float heels    Nutrition Interventions: Document food/fluid/supplement intake, Discuss nutritional consult with provider    Friction and Shear Interventions: HOB 30 degrees or less                Problem: Patient Education: Go to Patient Education Activity  Goal: Patient/Family Education  Outcome: Progressing Towards Goal     Problem: Falls - Risk of  Goal: *Absence of Falls  Description: Document Caitlin Fall Risk and appropriate interventions in the flowsheet.   Outcome: Progressing Towards Goal  Note: Fall Risk Interventions:            Medication Interventions: Bed/chair exit alarm                   Problem: Patient Education: Go to Patient Education Activity  Goal: Patient/Family Education  Outcome: Progressing Towards Goal     Problem: Risk for Elopement  Goal: Patient will not exit the unit/facility without proper escort  Outcome: Progressing Towards Goal     Problem: Breathing Pattern - Ineffective  Goal: *Absence of hypoxia  Outcome: Progressing Towards Goal  Goal: *Use of effective breathing techniques  Outcome: Progressing Towards Goal  Goal: *PALLIATIVE CARE:  Alleviation of Dyspnea  Outcome: Progressing Towards Goal     Problem: Patient Education: Go to Patient Education Activity  Goal: Patient/Family Education  Outcome: Progressing Towards Goal

## 2023-02-18 NOTE — PROGRESS NOTES
End of Shift Note    Bedside shift change report given to Mandi (oncoming nurse) by Fahad Augustine RN (offgoing nurse). Report included the following information SBAR    Shift worked:  7a-7p     Shift summary and any significant changes:          Concerns for physician to address:       Zone phone for oncoming shift:          Activity:  Activity Level: Up with Assistance  Number times ambulated in hallways past shift: 0  Number of times OOB to chair past shift: 0    Cardiac:   Cardiac Monitoring: Yes      Cardiac Rhythm: Sinus Rhythm    Access:  Current line(s): PIV     Genitourinary:   Urinary status: voiding    Respiratory:   O2 Device: Nasal cannula  Chronic home O2 use?: N/A  Incentive spirometer at bedside: YES  Actual Volume (ml): 250 ml    GI:  Last Bowel Movement Date: 02/17/23  Current diet:  DIET ONE TIME MESSAGE  ADULT ORAL NUTRITION SUPPLEMENT Breakfast, Dinner; Standard High Calorie/High Protein  ADULT DIET Regular; 1200 ml; Safety Tray; Safety Tray (Disposables)  Passing flatus: YES  Tolerating current diet: YES       Pain Management:   Patient states pain is manageable on current regimen: YES    Skin:  Kelvin Score: 19  Interventions: increase time out of bed and limit briefs    Patient Safety:  Fall Score:  Total Score: 1  Interventions: bed/chair alarm, gripper socks, pt to call before getting OOB, and stay with me (per policy)       Length of Stay:  Expected LOS: 3d 12h  Actual LOS: [address], JORGE

## 2023-02-18 NOTE — PROGRESS NOTES
Hospitalist Progress Note    NAME: Jimena Omer   :  1965   MRN:  [id number]       Assessment / Plan:  Acute hypoxic respiratory failure, POA--prior hospitalist notes pt unable to afford O2  COPD exacerbation, POA  Multiple rib fractures, POA  -EKG on admission without no acute ischemic changes  -CTA with no PE. Acute right ninth rib fracture, multiple bilateral remote rib fractures  -breathing treatments  -steroids with goal to wean off, currently po prednisone  -still on supplemental O2 with goal to wean off, pt is uninsured with large cost    Acute on chronic hyponatremia, POA  -Presented with sodium 118, baseline around 126  -Nephrology help appreciated, was given tolvaptan  -trend with labs    Suicidal ideation noted by prior hospitalist  - Suicide precautions, constant observation  - Will not need to be involuntary committed but can by voluntary committed if patient chooses. She is leaning towards not going to inpatient psych noted by prior hospitalist  -monitor, pt currently is calm and denies any si/hi/hallucinations  -pscyh consulted  -pt does not voice si/hi/hallucinations    Alcohol abuse  -no withdrawal symptoms observed  -resume to monitor    Sinus tachycardia  -asymptomatic  -controlled on metoprolol    Nicotine use  -counseling was provided about smoking cessation by prior hospitalist  -nicotine patch    Incidental finding on CTA chest    Compression of multiple vertebral bodies. Partial atelectasis/scarring in the right middle lobe. Partially opacified bronchioles supplying the posterior aspect of the left  lower lobe suggesting mucus plugging and/or bronchitis.     Will need close follow-up as an outpatient      Code status: Full  Prophylaxis: Lovenox  Recommended Disposition: likely back to prior living arrangement, needs to be off O2 -- notes that meds could be sent to [place] [id number] but need to ensure affordability and possible followup with [facility]          Subjective: Pt reports she moved from      Review of Systems:  Symptom Y/N Comments  Symptom Y/N Comments   Fever/Chills n   Chest Pain n    Poor Appetite    Edema n    Cough n   Abdominal Pain n    Sputum    Joint Pain     SOB/JARAMILLO y  With physical activity  Pruritis/Rash     Nausea/vomit n   Tolerating PT/OT     Diarrhea n   Tolerating Diet y    Constipation n   Other       Could NOT obtain due to:          Objective:     VITALS:   Last 24hrs VS reviewed since prior progress note. Most recent are:  Patient Vitals for the past 24 hrs:   Temp Pulse Resp BP SpO2   02/18/23 1038 99.2 °F (37.3 °C) 87 18 131/75 99 %   02/18/23 0732 -- -- -- -- 96 %   02/18/23 0714 98.4 °F (36.9 °C) 91 20 (!) 149/76 98 %   02/17/23 2218 98.6 °F (37 °C) 87 20 128/88 100 %   02/17/23 1939 97.5 °F (36.4 °C) 96 18 129/84 100 %   02/17/23 1926 -- -- -- -- 100 %   02/17/23 1441 98.9 °F (37.2 °C) (!) 106 16 123/79 94 %   02/17/23 1318 -- -- -- -- 96 %         Intake/Output Summary (Last 24 hours) at 2/18/2023 1249  Last data filed at 2/18/2023 0019  Gross per 24 hour   Intake 900 ml   Output 175 ml   Net 725 ml            I had a face to face encounter and independently examined this patient on 2/18/2023, as outlined below:  PHYSICAL EXAM:  General: No acute distress    EENT:  Anicteric sclerae. MMM  Resp:  Nonlabored,  No accessory muscle use  CV:  Regular  rhythm,  No edema  GI:  Soft, Non distended, Non tender. +Bowel sounds  Neurologic:  No facial asymmetry. No aphasia or slurred speech. Symmetrical strength, Sensation grossly intact. Alert and oriented X 4.     Psych:   Good insight. Not anxious nor agitated  Skin:  No rashes.   No jaundice    Reviewed most current lab test results and cultures  YES  Reviewed most current radiology test results   YES  Review and summation of old records today    NO  Reviewed patient's current orders and MAR    YES  PMH/SH reviewed - no change compared to H&P  ________________________________________________________________________  Care Plan discussed with:    Comments   Patient X    Family      RN     Care Manager     Consultant                        Multidiciplinary team rounds were held today with , nursing, pharmacist and clinical coordinator. Patient's plan of care was discussed; medications were reviewed and discharge planning was addressed. ________________________________________________________________________        Comments   >50% of visit spent in counseling and coordination of care X    ________________________________________________________________________  Stacia Jackson MD     Procedures: see electronic medical records for all procedures/Xrays and details which were not copied into this note but were reviewed prior to creation of Plan. LABS:  I reviewed today's most current labs and imaging studies. Pertinent labs include:  No results for input(s): WBC, HGB, HCT, PLT, HGBEXT, HCTEXT, PLTEXT, HGBEXT, HCTEXT, PLTEXT in the last 72 hours. Recent Labs     02/18/23  0112 02/17/23  0500 02/16/23  2253 02/16/23  0950 02/16/23  0403   * 130*  129* 131*   < > 128*   K 4.2 4.0  --   --  4.4   CL 92* 91*  --   --  86*   CO2 34* 33*  --   --  35*   GLU 88 93  --   --  91   BUN 19 12  --   --  8   CREA 0.32* 0.38*  --   --  0.36*   CA 8.9 9.0  --   --  9.3   MG  --  1.9  --   --  1.8    < > = values in this interval not displayed.          Signed: Stacia Jackson MD

## 2023-02-18 NOTE — PROGRESS NOTES
[facility]  Progress Note        NAME: Jimena Omer       :  1965       MRN:  [id number]     Date/Time: 2023    Risk of deterioration: medium       Assessment:    Plan:  Acute on chronic hyponatremia-behaving like siadh  ETOH  HTN  Psych  Mucous plugging/sob Sodium has been improving with tolvaptan. Better today. Hold tolvaptan      [place] [facility] [place]has been seen by nephrology there in past-working diagnosis etoh/increased water intake             Subjective:     Chief Complaint:  \"I finally had a good night's sleep. \"\"  No N/V. No dyspnea. No pain. Objective:     VITALS:   Last 24hrs VS reviewed since prior progress note.  Most recent are:  Visit Vitals  BP (!) 149/76   Pulse 91   Temp 98.4 °F (36.9 °C)   Resp 20   Ht 5' 3\" (1.6 m)   Wt 43.7 kg (96 lb 5.5 oz)   SpO2 96%   BMI 17.07 kg/m²     SpO2 Readings from Last 6 Encounters:   23 96%    O2 Flow Rate (L/min): 1 l/min     Intake/Output Summary (Last 24 hours) at 2023 0840  Last data filed at 2023 0019  Gross per 24 hour   Intake 900 ml   Output 325 ml   Net 575 ml          Telemetry Reviewed       PHYSICAL EXAM:    NAD  No edema     Lab Data Reviewed: (see below)    Medications Reviewed: (see below)    PMH/SH reviewed - no change compared to H&P________________    Attending Physician: Swathi Galarza MD     ____________________________________________________  MEDICATIONS:  Current Facility-Administered Medications   Medication Dose Route Frequency    influenza vaccine  (6 mos+)(PF) (FLUARIX/FLULAVAL/FLUZONE QUAD) injection 0.5 mL  1 Each IntraMUSCular PRIOR TO DISCHARGE    predniSONE (DELTASONE) tablet 40 mg  40 mg Oral DAILY WITH BREAKFAST    budesonide (PULMICORT) 500 mcg/2 ml nebulizer suspension  500 mcg Nebulization BID RT    ALPRAZolam (XANAX) tablet 0.5 mg  0.5 mg Oral QID PRN    butalbital-acetaminophen-caffeine (FIORICET, ESGIC) -40 mg per tablet 1 Tablet  1 Tablet Oral Q6H PRN guaiFENesin ER (MUCINEX) tablet 1,200 mg  1,200 mg Oral BID    levalbuterol (XOPENEX) nebulizer soln 1.25 mg/3 mL  1.25 mg Nebulization Q6H RT    metoprolol tartrate (LOPRESSOR) tablet 25 mg  25 mg Oral Q12H    enoxaparin (LOVENOX) injection 30 mg  30 mg SubCUTAneous DAILY    sodium chloride (OCEAN) 0.65 % nasal squeeze bottle 2 Spray  2 Spray Both Nostrils Q2H PRN    sodium chloride soluble tablet 1,000 mg  1 g Oral BID    therapeutic multivitamin (THERAGRAN) tablet 1 Tablet  1 Tablet Oral DAILY    folic acid (FOLVITE) tablet 1 mg  1 mg Oral DAILY    thiamine mononitrate (B-1) tablet 100 mg  100 mg Oral DAILY    sodium chloride (NS) flush 5-40 mL  5-40 mL IntraVENous Q8H    sodium chloride (NS) flush 5-40 mL  5-40 mL IntraVENous PRN    acetaminophen (TYLENOL) tablet 650 mg  650 mg Oral Q6H PRN    Or    acetaminophen (TYLENOL) suppository 650 mg  650 mg Rectal Q6H PRN    polyethylene glycol (MIRALAX) packet 17 g  17 g Oral DAILY PRN    ondansetron (ZOFRAN ODT) tablet 4 mg  4 mg Oral Q8H PRN    Or    ondansetron (ZOFRAN) injection 4 mg  4 mg IntraVENous Q6H PRN    ipratropium (ATROVENT) 0.02 % nebulizer solution 0.5 mg  0.5 mg Nebulization Q4H PRN    LORazepam (ATIVAN) injection 1 mg  1 mg IntraVENous Q2H PRN    LORazepam (ATIVAN) injection 2 mg  2 mg IntraVENous Q2H PRN    nicotine (NICODERM CQ) 21 mg/24 hr patch 1 Patch  1 Patch TransDERmal DAILY    balsam peru-castor oiL (VENELEX) ointment   Topical BID        LABS:  No results for input(s): WBC, HGB, HCT, PLT, HGBEXT, HCTEXT, PLTEXT, HGBEXT, HCTEXT, PLTEXT in the last 72 hours.     Recent Labs     02/18/23  0112 02/17/23  0500 02/16/23  2253 02/16/23  0950 02/16/23  0403   * 130*  129* 131*   < > 128*   K 4.2 4.0  --   --  4.4   CL 92* 91*  --   --  86*   CO2 34* 33*  --   --  35*   BUN 19 12  --   --  8   CREA 0.32* 0.38*  --   --  0.36*   GLU 88 93  --   --  91   CA 8.9 9.0  --   --  9.3   MG  --  1.9  --   --  1.8    < > = values in this interval not displayed. No results for input(s): ALT, AP, TBIL, TBILI, TP, ALB, GLOB, GGT, AML, LPSE in the last 72 hours. No lab exists for component: SGOT, GPT, AMYP, HLPSE    No results for input(s): INR, PTP, APTT, INREXT, INREXT in the last 72 hours. No results for input(s): FE, TIBC, PSAT, FERR in the last 72 hours. No results for input(s): PH, PCO2, PO2 in the last 72 hours. No results for input(s): CPK, CKNDX, TROIQ in the last 72 hours.     No lab exists for component: CPKMB  No results found for: Татьяна Jones

## 2023-02-18 NOTE — PROGRESS NOTES
Problem: Falls - Risk of  Goal: *Absence of Falls  Description: Document [facility] Fall Risk and appropriate interventions in the flowsheet. Outcome: Progressing Towards Goal  Note: Fall Risk Interventions:            Medication Interventions: Bed/chair exit alarm                   Problem: Falls - Risk of  Goal: *Absence of Falls  Description: Document [facility] Fall Risk and appropriate interventions in the flowsheet.   Outcome: Progressing Towards Goal  Note: Fall Risk Interventions:            Medication Interventions: Bed/chair exit alarm

## 2023-02-19 LAB
ANION GAP SERPL CALC-SCNC: 1 MMOL/L (ref 5–15)
BUN SERPL-MCNC: 20 MG/DL (ref 6–20)
BUN/CREAT SERPL: 63 (ref 12–20)
CALCIUM SERPL-MCNC: 9.2 MG/DL (ref 8.5–10.1)
CHLORIDE SERPL-SCNC: 94 MMOL/L (ref 97–108)
CO2 SERPL-SCNC: 35 MMOL/L (ref 21–32)
CREAT SERPL-MCNC: 0.32 MG/DL (ref 0.55–1.02)
GLUCOSE SERPL-MCNC: 99 MG/DL (ref 65–100)
POTASSIUM SERPL-SCNC: 3.8 MMOL/L (ref 3.5–5.1)
SODIUM SERPL-SCNC: 130 MMOL/L (ref 136–145)

## 2023-02-19 PROCEDURE — 94640 AIRWAY INHALATION TREATMENT: CPT

## 2023-02-19 PROCEDURE — 36415 COLL VENOUS BLD VENIPUNCTURE: CPT

## 2023-02-19 PROCEDURE — 74011250637 HC RX REV CODE- 250/637: Performed by: INTERNAL MEDICINE

## 2023-02-19 PROCEDURE — 74011250637 HC RX REV CODE- 250/637: Performed by: NURSE PRACTITIONER

## 2023-02-19 PROCEDURE — 74011000250 HC RX REV CODE- 250: Performed by: INTERNAL MEDICINE

## 2023-02-19 PROCEDURE — 74011000250 HC RX REV CODE- 250: Performed by: NURSE PRACTITIONER

## 2023-02-19 PROCEDURE — 74011250636 HC RX REV CODE- 250/636: Performed by: INTERNAL MEDICINE

## 2023-02-19 PROCEDURE — 80048 BASIC METABOLIC PNL TOTAL CA: CPT

## 2023-02-19 PROCEDURE — 65270000046 HC RM TELEMETRY

## 2023-02-19 PROCEDURE — 77010033678 HC OXYGEN DAILY

## 2023-02-19 PROCEDURE — 74011636637 HC RX REV CODE- 636/637: Performed by: INTERNAL MEDICINE

## 2023-02-19 RX ADMIN — METOPROLOL TARTRATE 25 MG: 25 TABLET, FILM COATED ORAL at 22:31

## 2023-02-19 RX ADMIN — Medication 1000 MG: at 08:59

## 2023-02-19 RX ADMIN — THIAMINE HCL TAB 100 MG 100 MG: 100 TAB at 08:59

## 2023-02-19 RX ADMIN — FOLIC ACID 1 MG: 1 TABLET ORAL at 08:59

## 2023-02-19 RX ADMIN — ENOXAPARIN SODIUM 30 MG: 100 INJECTION SUBCUTANEOUS at 08:59

## 2023-02-19 RX ADMIN — LEVALBUTEROL HYDROCHLORIDE 1.25 MG: 1.25 SOLUTION RESPIRATORY (INHALATION) at 19:12

## 2023-02-19 RX ADMIN — METOPROLOL TARTRATE 25 MG: 25 TABLET, FILM COATED ORAL at 08:59

## 2023-02-19 RX ADMIN — SODIUM CHLORIDE, PRESERVATIVE FREE 10 ML: 5 INJECTION INTRAVENOUS at 14:00

## 2023-02-19 RX ADMIN — CASTOR OIL AND BALSAM, PERU: 788; 87 OINTMENT TOPICAL at 22:32

## 2023-02-19 RX ADMIN — THERA TABS 1 TABLET: TAB at 09:00

## 2023-02-19 RX ADMIN — BUDESONIDE INHALATION 500 MCG: 0.5 SUSPENSION RESPIRATORY (INHALATION) at 19:12

## 2023-02-19 RX ADMIN — LEVALBUTEROL HYDROCHLORIDE 1.25 MG: 1.25 SOLUTION RESPIRATORY (INHALATION) at 13:16

## 2023-02-19 RX ADMIN — BUDESONIDE INHALATION 500 MCG: 0.5 SUSPENSION RESPIRATORY (INHALATION) at 09:35

## 2023-02-19 RX ADMIN — Medication 1000 MG: at 18:20

## 2023-02-19 RX ADMIN — SODIUM CHLORIDE, PRESERVATIVE FREE 10 ML: 5 INJECTION INTRAVENOUS at 22:31

## 2023-02-19 RX ADMIN — GUAIFENESIN 1200 MG: 600 TABLET, EXTENDED RELEASE ORAL at 08:59

## 2023-02-19 RX ADMIN — SODIUM CHLORIDE, PRESERVATIVE FREE 10 ML: 5 INJECTION INTRAVENOUS at 06:50

## 2023-02-19 RX ADMIN — ALPRAZOLAM 0.5 MG: 0.5 TABLET ORAL at 03:38

## 2023-02-19 RX ADMIN — LEVALBUTEROL HYDROCHLORIDE 1.25 MG: 1.25 SOLUTION RESPIRATORY (INHALATION) at 01:10

## 2023-02-19 RX ADMIN — PREDNISONE 40 MG: 20 TABLET ORAL at 09:00

## 2023-02-19 RX ADMIN — CASTOR OIL AND BALSAM, PERU: 788; 87 OINTMENT TOPICAL at 09:01

## 2023-02-19 RX ADMIN — GUAIFENESIN 1200 MG: 600 TABLET, EXTENDED RELEASE ORAL at 18:20

## 2023-02-19 RX ADMIN — LEVALBUTEROL HYDROCHLORIDE 1.25 MG: 1.25 SOLUTION RESPIRATORY (INHALATION) at 09:37

## 2023-02-19 NOTE — PROGRESS NOTES
ADULT PROTOCOL: JET AEROSOL  REASSESSMENT    Patient  Jackeline Lorenzo     61 y.o.   female     2/19/2023  12:45 PM    Breath Sounds Pre Procedure: Right Breath Sounds: Expiratory wheezing                               Left Breath Sounds: Expiratory wheezing    Breath Sounds Post Procedure: Right Breath Sounds: Expiratory wheezing                                 Left Breath Sounds: Expiratory wheezing    Breathing pattern: Pre procedure Breathing Pattern: Regular          Post procedure Breathing Pattern: Regular    Heart Rate: Pre procedure Pulse: 100           Post procedure Pulse: 99    Resp Rate: Pre procedure Respirations: 20           Post procedure Respirations: 18    Peak Flow: Pre bronchodilator             Post bronchodilator         Oxygen: O2 Device: Nasal cannula    ra     Changed: NO    SpO2: Pre procedure SpO2: 99 %   with oxygen              Post procedure SpO2: 99 %  with oxygen    Nebulizer Therapy: Current medications Aerosolized Medications: Xopenex, Pulmicort      Changed: NO      Problem List:   Patient Active Problem List   Diagnosis Code    Acute respiratory distress R06.03    Hyponatremia E87.1       Respiratory Therapist: Vane Nunez RT

## 2023-02-19 NOTE — PROGRESS NOTES
End of Shift Note    Bedside shift change report given to [facility] (oncoming nurse) by Vishnu Cleveland (offgoing nurse).   Report included the following information SBAR, Kardex, Procedure Summary, Intake/Output, MAR, and Recent Results    Shift worked:  7-7pm     Shift summary and any significant changes:     No significant changes     Concerns for physician to address:       Zone phone for oncoming shift:              Vishnu Cleveland

## 2023-02-19 NOTE — PROGRESS NOTES
Home Oxygen Test  Date of test: 2/19  Time of test: 1332    Sa02 94 % on room air AT REST. Sa02 82 % on room air DURING AMBULATION. Sa02 92 % on 2 Liters DURING AMBULATION. Sa02 93 % on 1 Liters AT REST/AFTER AMBULATION.

## 2023-02-19 NOTE — PROGRESS NOTES
[facility]  Progress Note        NAME: Jackeline Lorenzo       :  1965       MRN:  [id number]     Date/Time: 2023    Risk of deterioration: medium       Assessment:    Plan:  Acute on chronic hyponatremia-behaving like siadh  ETOH  HTN  Psych  Mucous plugging/sob Sodium has been improving with tolvaptan. Stable today. Hold tolvaptan      [facility] from [facility]-has been seen by nephrology there in past-working diagnosis etoh/increased water intake             Subjective:     Chief Complaint:  \"I feel better. \"  No N/V. No dyspnea. No pain. Objective:     VITALS:   Last 24hrs VS reviewed since prior progress note.  Most recent are:  Visit Vitals  /83   Pulse 93   Temp 98.7 °F (37.1 °C)   Resp 18   Ht 5' 3\" (1.6 m)   Wt 43.7 kg (96 lb 5.5 oz)   SpO2 100%   BMI 17.07 kg/m²     SpO2 Readings from Last 6 Encounters:   23 100%    O2 Flow Rate (L/min): 1 l/min     Intake/Output Summary (Last 24 hours) at 2023 0834  Last data filed at 2023 5434  Gross per 24 hour   Intake 900 ml   Output 200 ml   Net 700 ml          Telemetry Reviewed       PHYSICAL EXAM:    NAD  No edema     Lab Data Reviewed: (see below)    Medications Reviewed: (see below)    PMH/SH reviewed - no change compared to H&P________________    Attending Physician: Lawerence Sacks, MD     ____________________________________________________  MEDICATIONS:  Current Facility-Administered Medications   Medication Dose Route Frequency    influenza vaccine  (6 mos+)(PF) (FLUARIX/FLULAVAL/FLUZONE QUAD) injection 0.5 mL  1 Each IntraMUSCular PRIOR TO DISCHARGE    predniSONE (DELTASONE) tablet 40 mg  40 mg Oral DAILY WITH BREAKFAST    budesonide (PULMICORT) 500 mcg/2 ml nebulizer suspension  500 mcg Nebulization BID RT    ALPRAZolam (XANAX) tablet 0.5 mg  0.5 mg Oral QID PRN    butalbital-acetaminophen-caffeine (FIORICET, ESGIC) -40 mg per tablet 1 Tablet  1 Tablet Oral Q6H PRN    guaiFENesin ER (MUCINEX) tablet 1,200 mg  1,200 mg Oral BID    levalbuterol (XOPENEX) nebulizer soln 1.25 mg/3 mL  1.25 mg Nebulization Q6H RT    metoprolol tartrate (LOPRESSOR) tablet 25 mg  25 mg Oral Q12H    enoxaparin (LOVENOX) injection 30 mg  30 mg SubCUTAneous DAILY    sodium chloride (OCEAN) 0.65 % nasal squeeze bottle 2 Spray  2 Spray Both Nostrils Q2H PRN    sodium chloride soluble tablet 1,000 mg  1 g Oral BID    therapeutic multivitamin (THERAGRAN) tablet 1 Tablet  1 Tablet Oral DAILY    folic acid (FOLVITE) tablet 1 mg  1 mg Oral DAILY    thiamine mononitrate (B-1) tablet 100 mg  100 mg Oral DAILY    sodium chloride (NS) flush 5-40 mL  5-40 mL IntraVENous Q8H    sodium chloride (NS) flush 5-40 mL  5-40 mL IntraVENous PRN    acetaminophen (TYLENOL) tablet 650 mg  650 mg Oral Q6H PRN    Or    acetaminophen (TYLENOL) suppository 650 mg  650 mg Rectal Q6H PRN    polyethylene glycol (MIRALAX) packet 17 g  17 g Oral DAILY PRN    ondansetron (ZOFRAN ODT) tablet 4 mg  4 mg Oral Q8H PRN    Or    ondansetron (ZOFRAN) injection 4 mg  4 mg IntraVENous Q6H PRN    ipratropium (ATROVENT) 0.02 % nebulizer solution 0.5 mg  0.5 mg Nebulization Q4H PRN    LORazepam (ATIVAN) injection 1 mg  1 mg IntraVENous Q2H PRN    LORazepam (ATIVAN) injection 2 mg  2 mg IntraVENous Q2H PRN    nicotine (NICODERM CQ) 21 mg/24 hr patch 1 Patch  1 Patch TransDERmal DAILY    balsam peru-castor oiL (VENELEX) ointment   Topical BID        LABS:  No results for input(s): WBC, HGB, HCT, PLT, HGBEXT, HCTEXT, PLTEXT, HGBEXT, HCTEXT, PLTEXT in the last 72 hours. Recent Labs     02/19/23  0335 02/18/23  0112 02/17/23  0500   * 131* 130*  129*   K 3.8 4.2 4.0   CL 94* 92* 91*   CO2 35* 34* 33*   BUN 20 19 12   CREA 0.32* 0.32* 0.38*   GLU 99 88 93   CA 9.2 8.9 9.0   MG  --   --  1.9       No results for input(s): ALT, AP, TBIL, TBILI, TP, ALB, GLOB, GGT, AML, LPSE in the last 72 hours.     No lab exists for component: SGOT, GPT, AMYP, HLPSE    No results for input(s): INR, PTP, APTT, INREXT, INREXT in the last 72 hours. No results for input(s): FE, TIBC, PSAT, FERR in the last 72 hours. No results for input(s): PH, PCO2, PO2 in the last 72 hours. No results for input(s): CPK, CKNDX, TROIQ in the last 72 hours.     No lab exists for component: CPKMB  No results found for: 4295  [facility]

## 2023-02-19 NOTE — PROGRESS NOTES
End of Shift Note    Bedside shift change report given to [facility] (oncoming nurse) by Leticia Torres (offgoing nurse).   Report included the following information SBAR, Kardex, Procedure Summary, Intake/Output, MAR, and Recent Results    Shift worked:  7-7pm     Shift summary and any significant changes:     No significant changes     Concerns for physician to address:       Zone phone for oncoming shift:              Leticia Torres

## 2023-02-19 NOTE — PROGRESS NOTES
Problem: Pressure Injury - Risk of  Goal: *Prevention of pressure injury  Description: Document Kelvin Scale and appropriate interventions in the flowsheet. Outcome: Progressing Towards Goal  Note: Pressure Injury Interventions:  Sensory Interventions: Assess changes in LOC    Moisture Interventions: Absorbent underpads    Activity Interventions: Increase time out of bed    Mobility Interventions: HOB 30 degrees or less    Nutrition Interventions: Document food/fluid/supplement intake    Friction and Shear Interventions: HOB 30 degrees or less                Problem: Pressure Injury - Risk of  Goal: *Prevention of pressure injury  Description: Document Kelvin Scale and appropriate interventions in the flowsheet.   Outcome: Progressing Towards Goal  Note: Pressure Injury Interventions:  Sensory Interventions: Assess changes in LOC    Moisture Interventions: Absorbent underpads    Activity Interventions: Increase time out of bed    Mobility Interventions: HOB 30 degrees or less    Nutrition Interventions: Document food/fluid/supplement intake    Friction and Shear Interventions: HOB 30 degrees or less

## 2023-02-19 NOTE — PROGRESS NOTES
Hospitalist Progress Note    NAME: Curtis Muller   :  1965   MRN:  [id number]       Assessment / Plan:  Acute hypoxic respiratory failure, POA--prior hospitalist notes pt unable to afford O2  COPD exacerbation, POA  Multiple rib fractures, POA  -EKG on admission without no acute ischemic changes  -CTA with no PE. Acute right ninth rib fracture, multiple bilateral remote rib fractures  -breathing treatments  -steroids with goal to wean off, currently po prednisone  -still on supplemental O2 with goal to wean off, pt is uninsured with large cost  -still desats on exertion on RA    Acute on chronic hyponatremia, POA  -Presented with sodium 118, baseline around 126  -Nephrology help appreciated, was given tolvaptan  -trend with labs    Suicidal ideation noted by prior hospitalist  - Suicide precautions, constant observation  - Will not need to be involuntary committed but can by voluntary committed if patient chooses. She is leaning towards not going to inpatient psych noted by prior hospitalist  -monitor, pt currently is calm and denies any si/hi/hallucinations  -[facility]cyh consulted  -pt does not voice si/hi/hallucinations    Alcohol abuse  -no withdrawal symptoms observed  -resume to monitor    Sinus tachycardia  -asymptomatic  -controlled on metoprolol    Nicotine use  -counseling was provided about smoking cessation by prior hospitalist  -nicotine patch    Incidental finding on CTA chest  Compression of multiple vertebral bodies. Partial atelectasis/scarring in the right middle lobe. Partially opacified bronchioles supplying the posterior aspect of the left  lower lobe suggesting mucus plugging and/or bronchitis.   Will need close follow-up as an outpatient      Code status: Full  Prophylaxis: Lovenox  Recommended Disposition: likely back to prior living arrangement, needs to be off O2 -- notes that meds could be sent to [email or web address]  but need to ensure affordability and possible followup with [facility]        Subjective:   SOB on exertion  Afebrile  No cough  No acute events last night     Review of Systems:  Symptom Y/N Comments  Symptom Y/N Comments   Fever/Chills n   Chest Pain n    Poor Appetite    Edema n    Cough n   Abdominal Pain n    Sputum    Joint Pain     SOB/JARAMILLO y  With physical activity  Pruritis/Rash     Nausea/vomit n   Tolerating PT/OT     Diarrhea n   Tolerating Diet y    Constipation n   Other       Could NOT obtain due to:          Objective:     VITALS:   Last 24hrs VS reviewed since prior progress note. Most recent are:  Patient Vitals for the past 24 hrs:   Temp Pulse Resp BP SpO2   02/19/23 1316 -- -- -- -- 98 %   02/19/23 1030 98.1 °F (36.7 °C) 90 18 126/79 97 %   02/19/23 0937 -- -- -- -- 99 %   02/19/23 0710 98.7 °F (37.1 °C) 93 18 128/83 100 %   02/19/23 0340 98.2 °F (36.8 °C) 97 20 138/76 96 %   02/19/23 0111 -- -- -- -- 96 %   02/18/23 2308 98.3 °F (36.8 °C) 81 18 127/76 100 %   02/18/23 1938 97.9 °F (36.6 °C) (!) 110 20 138/85 95 %         Intake/Output Summary (Last 24 hours) at 2/19/2023 1539  Last data filed at 2/19/2023 5232  Gross per 24 hour   Intake 900 ml   Output 200 ml   Net 700 ml            I had a face to face encounter and independently examined this patient on 2/19/2023, as outlined below:  PHYSICAL EXAM:  General: No acute distress    EENT:  Anicteric sclerae. MMM  Resp:  Nonlabored,  No accessory muscle use  CV:  Regular  rhythm,  No edema  GI:  Soft, Non distended, Non tender. +Bowel sounds  Neurologic:  No facial asymmetry. No aphasia or slurred speech. Symmetrical strength, Sensation grossly intact. Alert and oriented X 4.     Psych:   Good insight. Not anxious nor agitated  Skin:  No rashes.   No jaundice    Reviewed most current lab test results and cultures  YES  Reviewed most current radiology test results   YES  Review and summation of old records today    NO  Reviewed patient's current orders and MAR    YES  PMH/SH reviewed - no change compared to H&P  ________________________________________________________________________  Care Plan discussed with:    Comments   Patient X    Family      RN X    Care Manager     Consultant                        Multidiciplinary team rounds were held today with , nursing, pharmacist and clinical coordinator. Patient's plan of care was discussed; medications were reviewed and discharge planning was addressed. ________________________________________________________________________        Comments   >50% of visit spent in counseling and coordination of care X    ________________________________________________________________________  Homero Tierney MD     Procedures: see electronic medical records for all procedures/Xrays and details which were not copied into this note but were reviewed prior to creation of Plan. LABS:  I reviewed today's most current labs and imaging studies. Pertinent labs include:  No results for input(s): WBC, HGB, HCT, PLT, HGBEXT, HCTEXT, PLTEXT, HGBEXT, HCTEXT, PLTEXT in the last 72 hours.     Recent Labs     02/19/23  0335 02/18/23  0112 02/17/23  0500   * 131* 130*  129*   K 3.8 4.2 4.0   CL 94* 92* 91*   CO2 35* 34* 33*   GLU 99 88 93   BUN 20 19 12   CREA 0.32* 0.32* 0.38*   CA 9.2 8.9 9.0   MG  --   --  1.9         Signed: Homero Tierney MD

## 2023-02-19 NOTE — PROGRESS NOTES
Problem: Pressure Injury - Risk of  Goal: *Prevention of pressure injury  Description: Document Kelvin Scale and appropriate interventions in the flowsheet. Outcome: Progressing Towards Goal  Note: Pressure Injury Interventions:  Sensory Interventions: Chair cushion, Float heels, Keep linens dry and wrinkle-free    Moisture Interventions: Absorbent underpads    Activity Interventions: Chair cushion, Increase time out of bed    Mobility Interventions: Chair cushion, Float heels, Turn and reposition approx. every two hours(pillow and wedges)    Nutrition Interventions: Document food/fluid/supplement intake, Discuss nutritional consult with provider, Offer support with meals,snacks and hydration    Friction and Shear Interventions: HOB 30 degrees or less                Problem: Patient Education: Go to Patient Education Activity  Goal: Patient/Family Education  Outcome: Progressing Towards Goal     Problem: Falls - Risk of  Goal: *Absence of Falls  Description: Document Caitlin Fall Risk and appropriate interventions in the flowsheet.   Outcome: Progressing Towards Goal  Note: Fall Risk Interventions:            Medication Interventions: Patient to call before getting OOB                   Problem: Risk for Elopement  Goal: Patient will not exit the unit/facility without proper escort  Outcome: Progressing Towards Goal     Problem: Breathing Pattern - Ineffective  Goal: *Absence of hypoxia  Outcome: Progressing Towards Goal  Goal: *Use of effective breathing techniques  Outcome: Progressing Towards Goal  Goal: *PALLIATIVE CARE:  Alleviation of Dyspnea  Outcome: Progressing Towards Goal

## 2023-02-20 LAB
ANION GAP SERPL CALC-SCNC: 1 MMOL/L (ref 5–15)
BUN SERPL-MCNC: 20 MG/DL (ref 6–20)
BUN/CREAT SERPL: 65 (ref 12–20)
CALCIUM SERPL-MCNC: 8.7 MG/DL (ref 8.5–10.1)
CHLORIDE SERPL-SCNC: 93 MMOL/L (ref 97–108)
CO2 SERPL-SCNC: 35 MMOL/L (ref 21–32)
CREAT SERPL-MCNC: 0.31 MG/DL (ref 0.55–1.02)
GLUCOSE SERPL-MCNC: 116 MG/DL (ref 65–100)
POTASSIUM SERPL-SCNC: 4 MMOL/L (ref 3.5–5.1)
SODIUM SERPL-SCNC: 129 MMOL/L (ref 136–145)

## 2023-02-20 PROCEDURE — 77010033678 HC OXYGEN DAILY

## 2023-02-20 PROCEDURE — 74011250636 HC RX REV CODE- 250/636: Performed by: INTERNAL MEDICINE

## 2023-02-20 PROCEDURE — 94640 AIRWAY INHALATION TREATMENT: CPT

## 2023-02-20 PROCEDURE — 74011000250 HC RX REV CODE- 250: Performed by: INTERNAL MEDICINE

## 2023-02-20 PROCEDURE — 36415 COLL VENOUS BLD VENIPUNCTURE: CPT

## 2023-02-20 PROCEDURE — 74011000250 HC RX REV CODE- 250: Performed by: NURSE PRACTITIONER

## 2023-02-20 PROCEDURE — 80048 BASIC METABOLIC PNL TOTAL CA: CPT

## 2023-02-20 PROCEDURE — 65270000046 HC RM TELEMETRY

## 2023-02-20 PROCEDURE — 74011636637 HC RX REV CODE- 636/637: Performed by: INTERNAL MEDICINE

## 2023-02-20 PROCEDURE — 74011250637 HC RX REV CODE- 250/637: Performed by: INTERNAL MEDICINE

## 2023-02-20 PROCEDURE — 74011250637 HC RX REV CODE- 250/637: Performed by: NURSE PRACTITIONER

## 2023-02-20 RX ORDER — BUDESONIDE AND FORMOTEROL FUMARATE DIHYDRATE 160; 4.5 UG/1; UG/1
2 AEROSOL RESPIRATORY (INHALATION) 2 TIMES DAILY
Qty: 1 EACH | Refills: 1 | Status: SHIPPED | OUTPATIENT
Start: 2023-02-20 | End: 2023-02-21

## 2023-02-20 RX ORDER — ASPIRIN 325 MG/1
100 TABLET, FILM COATED ORAL DAILY
Qty: 30 TABLET | Refills: 2 | Status: SHIPPED | OUTPATIENT
Start: 2023-02-21 | End: 2023-02-21 | Stop reason: SDUPTHER

## 2023-02-20 RX ORDER — IBUPROFEN 200 MG
1 TABLET ORAL DAILY
Qty: 30 PATCH | Refills: 2 | Status: SHIPPED | OUTPATIENT
Start: 2023-02-21 | End: 2023-02-21 | Stop reason: SDUPTHER

## 2023-02-20 RX ORDER — SODIUM CHLORIDE 1 G/1
1 TABLET ORAL 2 TIMES DAILY
Qty: 60 TABLET | Refills: 2 | Status: SHIPPED | OUTPATIENT
Start: 2023-02-20 | End: 2023-02-21 | Stop reason: SDUPTHER

## 2023-02-20 RX ORDER — METOPROLOL TARTRATE 25 MG/1
25 TABLET, FILM COATED ORAL EVERY 12 HOURS
Qty: 60 TABLET | Refills: 2 | Status: SHIPPED | OUTPATIENT
Start: 2023-02-20 | End: 2023-02-21 | Stop reason: SDUPTHER

## 2023-02-20 RX ORDER — FOLIC ACID 1 MG/1
1 TABLET ORAL DAILY
Qty: 30 TABLET | Refills: 2 | Status: SHIPPED | OUTPATIENT
Start: 2023-02-21 | End: 2023-02-21 | Stop reason: SDUPTHER

## 2023-02-20 RX ORDER — PREDNISONE 10 MG/1
TABLET ORAL
Qty: 32 TABLET | Refills: 0 | Status: SHIPPED | OUTPATIENT
Start: 2023-02-20 | End: 2023-02-21 | Stop reason: SDUPTHER

## 2023-02-20 RX ADMIN — THERA TABS 1 TABLET: TAB at 08:19

## 2023-02-20 RX ADMIN — Medication 1000 MG: at 08:19

## 2023-02-20 RX ADMIN — METOPROLOL TARTRATE 25 MG: 25 TABLET, FILM COATED ORAL at 08:19

## 2023-02-20 RX ADMIN — BUDESONIDE INHALATION 500 MCG: 0.5 SUSPENSION RESPIRATORY (INHALATION) at 19:34

## 2023-02-20 RX ADMIN — PREDNISONE 40 MG: 20 TABLET ORAL at 08:19

## 2023-02-20 RX ADMIN — GUAIFENESIN 1200 MG: 600 TABLET, EXTENDED RELEASE ORAL at 08:19

## 2023-02-20 RX ADMIN — CASTOR OIL AND BALSAM, PERU: 788; 87 OINTMENT TOPICAL at 10:22

## 2023-02-20 RX ADMIN — THIAMINE HCL TAB 100 MG 100 MG: 100 TAB at 08:19

## 2023-02-20 RX ADMIN — SODIUM CHLORIDE, PRESERVATIVE FREE 10 ML: 5 INJECTION INTRAVENOUS at 14:25

## 2023-02-20 RX ADMIN — LEVALBUTEROL HYDROCHLORIDE 1.25 MG: 1.25 SOLUTION RESPIRATORY (INHALATION) at 19:35

## 2023-02-20 RX ADMIN — GUAIFENESIN 1200 MG: 600 TABLET, EXTENDED RELEASE ORAL at 17:00

## 2023-02-20 RX ADMIN — ENOXAPARIN SODIUM 30 MG: 100 INJECTION SUBCUTANEOUS at 08:19

## 2023-02-20 RX ADMIN — SODIUM CHLORIDE, PRESERVATIVE FREE 10 ML: 5 INJECTION INTRAVENOUS at 21:03

## 2023-02-20 RX ADMIN — LEVALBUTEROL HYDROCHLORIDE 1.25 MG: 1.25 SOLUTION RESPIRATORY (INHALATION) at 13:57

## 2023-02-20 RX ADMIN — LEVALBUTEROL HYDROCHLORIDE 1.25 MG: 1.25 SOLUTION RESPIRATORY (INHALATION) at 07:34

## 2023-02-20 RX ADMIN — LEVALBUTEROL HYDROCHLORIDE 1.25 MG: 1.25 SOLUTION RESPIRATORY (INHALATION) at 02:19

## 2023-02-20 RX ADMIN — FOLIC ACID 1 MG: 1 TABLET ORAL at 08:19

## 2023-02-20 RX ADMIN — SODIUM CHLORIDE, PRESERVATIVE FREE 10 ML: 5 INJECTION INTRAVENOUS at 06:00

## 2023-02-20 RX ADMIN — BUDESONIDE INHALATION 500 MCG: 0.5 SUSPENSION RESPIRATORY (INHALATION) at 07:34

## 2023-02-20 RX ADMIN — Medication 1000 MG: at 17:00

## 2023-02-20 RX ADMIN — CASTOR OIL AND BALSAM, PERU: 788; 87 OINTMENT TOPICAL at 21:05

## 2023-02-20 RX ADMIN — ALPRAZOLAM 0.5 MG: 0.5 TABLET ORAL at 00:33

## 2023-02-20 RX ADMIN — METOPROLOL TARTRATE 25 MG: 25 TABLET, FILM COATED ORAL at 21:03

## 2023-02-20 NOTE — PROGRESS NOTES
Music Therapy Assessment  [address] [place] [address] [phone]     1965  61 y.o.  female    Patient Telephone Number: [phone] (home)   Oriental orthodox Affiliation: Unknown   Language: English   Patient Active Problem List    Diagnosis Date Noted    Acute respiratory distress 02/07/2023    Hyponatremia 02/07/2023        Date: 2/20/2023            Total Time (in minutes): 50          MRM 2 CARDIOPULMONARY CARE    Mental Status:   [  ] Alert [  ] [place] [  ]  Confused  [  ] Minimally responsive  [  ] Sleeping    Communication Status: [  ] Impaired Speech [  ] Nonverbal     Physical Status:   [  ] Oxygen in use  [  ] Hard of Hearing [  ] Vision Impaired  [  ] Ambulatory  [  ] Ambulatory with assistance [  ] Non-ambulatory     Music Preferences, Background: ______________________________    Clinical Problem addressed: ________________    Goal(s) met in session:  Physical/Pain management (Scale of 1-10):    Pre-session rating ___________    Post-session rating __________  [  ] Increased relaxation   [  ] Affected breathing patterns  [  ] Decreased muscle tension   [  ] Decreased agitation  [  ] Affected heart rate    [  ] Increased alertness     Emotional/Psychological:  [  ] Increased self-expression   [  ] Decreased aggressive behavior   [  ] Decreased feelings of stress  [  ] Discussed healthy coping skills     [  ] Improved mood    [  ] Decreased withdrawn behavior     Social:  [  ] Decreased feelings of isolation/loneliness [  ] Positive social interaction   [  ] Provided support and/or comfort for family/friends    Spiritual:  [  ] Spiritual support    [  ] Expressed peace  [  ] Expressed geoffrey    [  ] Discussed beliefs    Techniques Utilized (Check all that apply):   [  ] Procedural support MT [  ] Music for relaxation [  ] Patient preferred music  [  ] Nahomi analysis  [  ] Song choice  [  ] Music for validation  [  ] Entrainment  [  ] Movement to music [  ] Guided visualization  [ ] ISO Principle  [  ] Patient instrument playing [  ] Laren Kinds writing  [  ] [place] Ronna along   [  ] Grayce Allis  [  ] Sensory stimulation  [  ] Active Listening  [  ] Music for spiritual support [  ] Making of CDs as gifts    Session Observations:  Referral from Magnus Al. Patient (pt) was alert sitting in a chair receiving information from her nurse. This music therapist (MT) waited several minutes outside the pt's door and entered when her nurse exited and said his visit was complete. MT asked the pt how she was feeling. Pt said she felt scared and her facial expressions and eyes displayed this. The pt went on to share about her practical, logistical, social, and emotional concerns around her new medical needs and adjusting to coping with these at home. MT provided empathic listening and then words of validation. MT offered a music therapy experience to support stress-reduction today, and that would give pt some relaxation tools she could use without the the MT too. Pt was amenable to trying this. MT sat across from the pt and softly played the acoustic guitar at a moderately slow tempo. MT spoke the pt through deep breathing, mindfulness, and guided visualization. The pt appeared to increase relaxation in response to the experience provided, as evidenced by (AEB) closing her eyes, her breathing deepening, and her facial expression relaxing. Before MT concluded the experience the pt opened her eyes and said that was good, that was enough. MT ended the music and asked the pt what it was like for her. Pt said that as MT led her through the guided visualization she was imagining being in the [facility], and then shared about a beautiful vacation she once had there. MT provided active listening and words of support, and then briefly shared how the pt could take herself through an experience like this without the MT's presence. Pt thanked MT for the session.     Festus Pathak MT-BC (Music Therapist-Board Certified)  [facility]  Referral-based service

## 2023-02-20 NOTE — PROGRESS NOTES
Transition of Care Plan:    RUR: 9%   Disposition: Return to home with OP PT/OT script - resources given    [facility] - referral sent for home O2 for hospital to cover only for 3 months - pending acceptance    CM sent message to MD to send scripts to [facility] to cost out meds- # [phone] -- waiting for meds to be sent from pharmacy at this time  If SNF or IPR: Date FOC offered:  Date FOC received:  Date authorization started with reference number:  Date authorization received and expires:  Accepting facility:  [facility] up [facility]   DME needed: rolling walker   Transportation at Discharge: friend   Ana Garay or means to access home:      patient/ brother  IM Medicare Letter:  Is patient a [place] and connected with the [place]? If yes, was [employer] transfer form completed and VA notified? Caregiver Contact:    Sally Pena mother [phone]  Neema Cohen friend [phone]      Discharge Caregiver contacted prior to discharge? Per patient  Care Conference needed?:          no    CM met with patient - housing resources provided her her request- waiting on approval from [employer] [facility] for home O2 - hospital to cover for only 3 months via contract- [id number] patient is medicaid pending - Patient reminded to [facility] for [facility]CP and to get help with medication coverage- patient previously given MH resources/sub abuse resources- [facility] has approved for hospital contract. CM to deliver tank to bedside. CM to anticipate discharge today pending medication cost out.   ABIGAIL Pagie

## 2023-02-20 NOTE — PROGRESS NOTES
[facility]  Progress Note        NAME: Sophia Prado       :  1965       MRN:  [id number]     Date/Time: 2023    Risk of deterioration: medium       Assessment:    Plan:  Acute on chronic hyponatremia-likely siadh and high fluid intake and beer potomania     ETOH use (heavy beer intake)  HTN  COPD   Sodium marla 118 on , up to 131, now is 129 with tolvaptan (last dose was ). Continue 1200 ml/day FR and salt tab bid    Goal sodium ~ 130. Stable for discharge from my standpoint. Subjective:     Chief Complaint:  \"I feel better. \"  No N/V. No dyspnea. No pain. We discussed the above        Objective:     VITALS:   Last 24hrs VS reviewed since prior progress note. Most recent are:  Visit Vitals  /68 (BP 1 Location: Left upper arm, BP Patient Position: At rest)   Pulse 84   Temp 98 °F (36.7 °C)   Resp 18   Ht 5' 3\" (1.6 m)   Wt 43.7 kg (96 lb 5.5 oz)   SpO2 100%   BMI 17.07 kg/m²     SpO2 Readings from Last 6 Encounters:   23 100%    O2 Flow Rate (L/min): 1 l/min     Intake/Output Summary (Last 24 hours) at 2023 0541  Last data filed at 2023 1824  Gross per 24 hour   Intake 1000 ml   Output 50 ml   Net 950 ml          Telemetry Reviewed       PHYSICAL EXAM:    NAD  No edema     Lab Data Reviewed: (see below)    Medications Reviewed: (see below)    PMH/SH reviewed - no change compared to H&P________________    Attending Physician:  Khloe Ramachandran MD     ____________________________________________________  MEDICATIONS:  Current Facility-Administered Medications   Medication Dose Route Frequency    influenza vaccine  (6 mos+)(PF) (FLUARIX/FLULAVAL/FLUZONE QUAD) injection 0.5 mL  1 Each IntraMUSCular PRIOR TO DISCHARGE    predniSONE (DELTASONE) tablet 40 mg  40 mg Oral DAILY WITH BREAKFAST    budesonide (PULMICORT) 500 mcg/2 ml nebulizer suspension  500 mcg Nebulization BID RT    ALPRAZolam (XANAX) tablet 0.5 mg  0.5 mg Oral QID PRN butalbital-acetaminophen-caffeine (FIORICET, ESGIC) -40 mg per tablet 1 Tablet  1 Tablet Oral Q6H PRN    guaiFENesin ER (MUCINEX) tablet 1,200 mg  1,200 mg Oral BID    levalbuterol (XOPENEX) nebulizer soln 1.25 mg/3 mL  1.25 mg Nebulization Q6H RT    metoprolol tartrate (LOPRESSOR) tablet 25 mg  25 mg Oral Q12H    enoxaparin (LOVENOX) injection 30 mg  30 mg SubCUTAneous DAILY    sodium chloride (OCEAN) 0.65 % nasal squeeze bottle 2 Spray  2 Spray Both Nostrils Q2H PRN    sodium chloride soluble tablet 1,000 mg  1 g Oral BID    therapeutic multivitamin (THERAGRAN) tablet 1 Tablet  1 Tablet Oral DAILY    folic acid (FOLVITE) tablet 1 mg  1 mg Oral DAILY    thiamine mononitrate (B-1) tablet 100 mg  100 mg Oral DAILY    sodium chloride (NS) flush 5-40 mL  5-40 mL IntraVENous Q8H    sodium chloride (NS) flush 5-40 mL  5-40 mL IntraVENous PRN    acetaminophen (TYLENOL) tablet 650 mg  650 mg Oral Q6H PRN    Or    acetaminophen (TYLENOL) suppository 650 mg  650 mg Rectal Q6H PRN    polyethylene glycol (MIRALAX) packet 17 g  17 g Oral DAILY PRN    ondansetron (ZOFRAN ODT) tablet 4 mg  4 mg Oral Q8H PRN    Or    ondansetron (ZOFRAN) injection 4 mg  4 mg IntraVENous Q6H PRN    ipratropium (ATROVENT) 0.02 % nebulizer solution 0.5 mg  0.5 mg Nebulization Q4H PRN    LORazepam (ATIVAN) injection 1 mg  1 mg IntraVENous Q2H PRN    LORazepam (ATIVAN) injection 2 mg  2 mg IntraVENous Q2H PRN    nicotine (NICODERM CQ) 21 mg/24 hr patch 1 Patch  1 Patch TransDERmal DAILY    balsam peru-castor oiL (VENELEX) ointment   Topical BID        LABS:  No results for input(s): WBC, HGB, HCT, PLT, HGBEXT, HCTEXT, PLTEXT, HGBEXT, HCTEXT, PLTEXT in the last 72 hours.     Recent Labs     02/20/23  0225 02/19/23  0335 02/18/23  0112   * 130* 131*   K 4.0 3.8 4.2   CL 93* 94* 92*   CO2 35* 35* 34*   BUN 20 20 19   CREA 0.31* 0.32* 0.32*   * 99 88   CA 8.7 9.2 8.9       No results for input(s): ALT, AP, TBIL, TBILI, TP, ALB, GLOB, GGT, AML, LPSE in the last 72 hours. No lab exists for component: SGOT, GPT, AMYP, HLPSE    No results for input(s): INR, PTP, APTT, INREXT, INREXT in the last 72 hours. No results for input(s): FE, TIBC, PSAT, FERR in the last 72 hours. No results for input(s): PH, PCO2, PO2 in the last 72 hours. No results for input(s): CPK, CKNDX, TROIQ in the last 72 hours.     No lab exists for component: CPKMB  No results found for: Izaiah Bernard

## 2023-02-20 NOTE — PROGRESS NOTES
End of Shift Note    Bedside shift change report given to *** (oncoming nurse) by Caterina Sheppard RN (offgoing nurse). Report included the following information SBAR    Shift worked:  7p-7a     Shift summary and any significant changes:     uneventful   Concerns for physician to address:       Zone phone for oncoming shift:        Activity:  Activity Level: Up with Assistance  Number times ambulated in hallways past shift: 0  Number of times OOB to chair past shift: 0    Cardiac:   Cardiac Monitoring: Yes      Cardiac Rhythm: Sinus Rhythm    Access:  Current line(s): PIV     Genitourinary:   Urinary status: voiding    Respiratory:   O2 Device: Nasal cannula  Chronic home O2 use?: YES  Incentive spirometer at bedside: YES  Actual Volume (ml): 250 ml    GI:  Last Bowel Movement Date: 02/19/23  Current diet:  DIET ONE TIME MESSAGE  ADULT ORAL NUTRITION SUPPLEMENT Breakfast, Dinner; Standard High Calorie/High Protein  ADULT DIET Regular; 1200 ml; Safety Tray; Safety Tray (Disposables)  Passing flatus: YES  Tolerating current diet: YES       Pain Management:   Patient states pain is manageable on current regimen: YES    Skin:  Kelvin Score: 19  Interventions: increase time out of bed    Patient Safety:  Fall Score:  Total Score: 1  Interventions: gripper socks and pt to call before getting OOB       Length of Stay:  Expected LOS: 3d 12h  Actual LOS: [address] Arechiga RN

## 2023-02-20 NOTE — DISCHARGE SUMMARY
Hospitalist Discharge Summary     Patient ID:  Felicita King  [id number]  61 y.o.  1965 2/6/2023    PCP on record: None    Admit date: 2/6/2023  Discharge date and time: 2/21/2023    DISCHARGE DIAGNOSIS:  Acute hypoxic respiratory failure, POA--prior hospitalist notes pt unable to afford O2  COPD exacerbation, POA  Multiple rib fractures, POA  Acute on chronic hyponatremia, POA  Suicidal ideation  Alcohol abuse  Follow-up sinus tachycardia  Nicotine use    CONSULTATIONS:  IP CONSULT TO NEPHROLOGY  IP CONSULT TO PSYCHIATRY  IP CONSULT TO PSYCHIATRY  IP CONSULT TO PSYCHIATRY  IP CONSULT TO PSYCHIATRY    Excerpted HPI from H&P of Sasha Giron, DO:  Felicita King is a 61 y.o.  female who presents with SOB that started around 3 pm. EMS was called and per EMS report, patient was drinking and smoking when they arrived. Documented O2 sat was 89%% on RA and she was placed on NC 2 L. Patient denied any other symptoms including fever, chills, headache, CP, nausea, vomiting, bowel and bladder habit changes. Patient has history of COPD and was unable to have recent medication refill including her inhaler. She was also trying to cut down on her alcohol intake but reportedly still drinks 2 40 oz beer on a daily basis. Patient has just received Ativan per [facility] protocol close to the time of assessment and was too sleepy to answer assessment questions. Most of the information was taken form ED and EMS documentation. We were asked to admit for work up and evaluation of the above problems. Past medical History  COPD  ETOH abuse  Nicotine abuse     ____________________________________________________________________  DISCHARGE SUMMARY/HOSPITAL COURSE:  for full details see H&P, daily progress notes, labs, consult notes.      Acute hypoxic respiratory failure, POA--prior hospitalist notes pt unable to afford O2  COPD exacerbation, POA  Multiple rib fractures, POA  -EKG on admission without no acute ischemic changes  -CTA with no PE. Acute right ninth rib fracture, multiple bilateral remote rib fractures  -breathing treatments  -steroids with goal to wean off, currently po prednisone  -still on supplemental O2 with goal to wean off, pt is uninsured with large cost  -still desats on exertion on RA     Acute on chronic hyponatremia, POA  -Presented with sodium 118, baseline around 126  -Nephrology help appreciated, was given tolvaptan  -cont salt tablets  -started on low dose lasix every other day  -Check BMP in one week     Suicidal ideation noted by prior hospitalist  - Suicide precautions, constant observation  - Will not need to be involuntary committed but can by voluntary committed if patient chooses. She is leaning towards not going to inpatient psych noted by prior hospitalist  -monitor, pt currently is calm and denies any si/hi/hallucinations  -pscyh consulted  -pt does not voice si/hi/hallucinations     Alcohol abuse  -no withdrawal symptoms observed  -resume to monitor     Sinus tachycardia  -asymptomatic  -controlled on metoprolol     Nicotine use  -counseling was provided about smoking cessation by prior hospitalist  -nicotine patch     Incidental finding on CTA chest  Compression of multiple vertebral bodies. Partial atelectasis/scarring in the right middle lobe. Partially opacified bronchioles supplying the posterior aspect of the left  lower lobe suggesting mucus plugging and/or bronchitis. Will need close follow-up as an outpatient       _______________________________________________________________________  Patient seen and examined by me on discharge day. Pertinent Findings:  Gen:    Not in distress  Chest: Clear lungs  CVS:   Regular rhythm.   No edema  Abd/: Soft, not distended, not tender  Neuro:  Alert, oriented   _______________________________________________________________________  DISCHARGE MEDICATIONS:   Current Discharge Medication List        START taking these medications    Details   predniSONE (DELTASONE) 10 mg tablet Take 40 mg by mouth daily (with breakfast) for 2 days, THEN 30 mg daily (with breakfast) for 4 days, THEN 20 mg daily (with breakfast) for 4 days, THEN 10 mg daily (with breakfast) for 4 days. Qty: 32 Tablet, Refills: 0  Start date: 2/21/2023, End date: 7/2/0216      folic acid (FOLVITE) 1 mg tablet Take 1 Tablet by mouth daily for 30 days. Qty: 30 Tablet, Refills: 2  Start date: 2/21/2023, End date: 3/23/2023      furosemide (LASIX) 20 mg tablet Take 1 Tablet by mouth every other day. Qty: 45 Tablet, Refills: 3  Start date: 2/21/2023      metoprolol tartrate (LOPRESSOR) 25 mg tablet Take 1 Tablet by mouth every twelve (12) hours for 30 days. Qty: 60 Tablet, Refills: 2  Start date: 2/21/2023, End date: 3/23/2023      nicotine (NICODERM CQ) 21 mg/24 hr 1 Patch by TransDERmal route daily for 30 days. Qty: 30 Patch, Refills: 2  Start date: 2/21/2023, End date: 3/23/2023      sodium chloride 1,000 mg soluble tablet Take 1 Tablet by mouth two (2) times a day for 30 days. Qty: 60 Tablet, Refills: 2  Start date: 2/21/2023, End date: 3/23/2023      thiamine mononitrate (B-1) 100 mg tablet Take 1 Tablet by mouth daily for 30 days. Qty: 30 Tablet, Refills: 2  Start date: 2/21/2023, End date: 3/23/2023      albuterol-ipratropium (DUO-NEB) 2.5 mg-0.5 mg/3 ml nebu 3 mL by Nebulization route every six (6) hours for 30 days. Qty: 360 mL, Refills: 2  Start date: 2/21/2023, End date: 3/23/2023      [facility], Outpatient physical and occupational therapy  Qty: 1 Each, Refills: 0  Start date: 2/21/2023               Patient Follow Up Instructions: Activity: Activity as tolerated  Diet: DIET ONE TIME MESSAGE  ADULT ORAL NUTRITION SUPPLEMENT Breakfast, Dinner; Standard High Calorie/High Protein  ADULT DIET Regular; 1200 ml; Safety Tray; Safety Tray (Disposables)  Wound Care: None needed  Follow-up with PCP in  1 week.   Follow-up tests/labs  BMP in one week  If you have any concerns that you feel you need to come back to the hospital, please do not hesitate. Follow-up Information       Follow up With Specialties Details Why [address]  Follow up Mental Health follow-up [phone]  [address] Medicaid  Follow up  Call [phone] to apply for [place] by phone or   [employer]    [facility]  Follow up or [place]  location - will need to complete financial aid application and bring income verification info to take to your first appointment - once qualified then you will be set up with a PCP at clinic [address] [place] [address] [address] [facility] DME  Follow up home oxygen - only 3 month coverage - please call for delivery of concentrator to home [address]  [phone]    None    None (395) Patient stated that they have no PCP      Ria Garcia MD Nephrology Follow up in 2 week(s)  [address]. [phone]      [facility]  Follow up phone# [phone] Medicaid resource          ________________________________________________________________    Risk of deterioration: Moderate    Condition at Discharge:  Stable  __________________________________________________________________    Disposition  Home with family and home health services    ____________________________________________________________________    Code Status: Full Code  ___________________________________________________________________      Total time in minutes spent coordinating this discharge (includes going over instructions, follow-up, prescriptions, and preparing report for sign off to her PCP) :  42 minutes    Signed:  Karolina Parada MD

## 2023-02-20 NOTE — PROGRESS NOTES
Problem: Falls - Risk of  Goal: *Absence of Falls  Description: Document Therese Bower Fall Risk and appropriate interventions in the flowsheet. Outcome: Progressing Towards Goal  Note: Fall Risk Interventions:            Medication Interventions: Patient to call before getting OOB                   Problem: Falls - Risk of  Goal: *Absence of Falls  Description: Document Therese Bower Fall Risk and appropriate interventions in the flowsheet.   Outcome: Progressing Towards Goal  Note: Fall Risk Interventions:            Medication Interventions: Patient to call before getting OOB

## 2023-02-20 NOTE — PROGRESS NOTES
Problem: Pressure Injury - Risk of  Goal: *Prevention of pressure injury  Description: Document Kelvin Scale and appropriate interventions in the flowsheet. Outcome: Progressing Towards Goal  Note: Pressure Injury Interventions:  Sensory Interventions: Assess changes in LOC, Keep linens dry and wrinkle-free, Maintain/enhance activity level, Minimize linen layers    Moisture Interventions: Absorbent underpads, Maintain skin hydration (lotion/cream), Minimize layers, Offer toileting Q_hr    Activity Interventions: Increase time out of bed    Mobility Interventions: HOB 30 degrees or less, PT/OT evaluation, Float heels    Nutrition Interventions: Document food/fluid/supplement intake, Offer support with meals,snacks and hydration    Friction and Shear Interventions: HOB 30 degrees or less, Minimize layers                Problem: Patient Education: Go to Patient Education Activity  Goal: Patient/Family Education  Outcome: Progressing Towards Goal     Problem: Falls - Risk of  Goal: *Absence of Falls  Description: Document Caitlin Fall Risk and appropriate interventions in the flowsheet.   Outcome: Progressing Towards Goal  Note: Fall Risk Interventions:            Medication Interventions: Patient to call before getting OOB                   Problem: Patient Education: Go to Patient Education Activity  Goal: Patient/Family Education  Outcome: Progressing Towards Goal     Problem: Risk for Elopement  Goal: Patient will not exit the unit/facility without proper escort  Outcome: Progressing Towards Goal     Problem: Breathing Pattern - Ineffective  Goal: *Absence of hypoxia  Outcome: Progressing Towards Goal  Goal: *Use of effective breathing techniques  Outcome: Progressing Towards Goal  Goal: *PALLIATIVE CARE:  Alleviation of Dyspnea  Outcome: Progressing Towards Goal     Problem: Patient Education: Go to Patient Education Activity  Goal: Patient/Family Education  Outcome: Progressing Towards Goal     Problem: Patient Education: Go to Patient Education Activity  Goal: Patient/Family Education  Outcome: Progressing Towards Goal     Problem: Patient Education: Go to Patient Education Activity  Goal: Patient/Family Education  Outcome: Progressing Towards Goal     Problem: Pain  Goal: *Control of Pain  Outcome: Progressing Towards Goal  Goal: *PALLIATIVE CARE:  Alleviation of Pain  Outcome: Progressing Towards Goal     Problem: Patient Education: Go to Patient Education Activity  Goal: Patient/Family Education  Outcome: Progressing Towards Goal     Problem: Chronic Obstructive Pulmonary Disease (COPD)  Goal: *Oxygen saturation during activity within specified parameters  Outcome: Progressing Towards Goal  Goal: *Able to remain out of bed as prescribed  Outcome: Progressing Towards Goal  Goal: *Absence of hypoxia  Outcome: Progressing Towards Goal  Goal: *Optimize nutritional status  Outcome: Progressing Towards Goal     Problem: Patient Education: Go to Patient Education Activity  Goal: Patient/Family Education  Outcome: Progressing Towards Goal

## 2023-02-20 NOTE — PROGRESS NOTES
Patient plans to go home with Home O2 from [facility] to pay for 3 months while she is waiting for Medicaid application to be completed. [facility] has completed application, needs to return to see patient to have [phone] signed. CM attempting to obtain medications from [facility] - they have to order medications that are not in stock - will be delivered by noon on 2/21/2023. Hospital to cover 30 days of medications. Pharmacy to [place] cost breakdown.     Babatunde Rosa RN, BSN, [address] [place] [address]  Manager of Case Management  [phone]

## 2023-02-20 NOTE — PROGRESS NOTES
0700 Bedside shift change report given to Essie Viramontes RN and David Sanabira RN (oncoming nurse) by Presley Castillo RN (offgoing nurse). Report included the following information SBAR, Kardex, MAR, Recent Results, and Cardiac Rhythm Sinus RHythm . 1930 End of Shift Note    Bedside shift change report given to ,RN (oncoming nurse) by Sundar Carrera RN (offgoing nurse). Report included the following information SBAR, Kardex, MAR, Recent Results, and Cardiac Rhythm Sinus Rhythm    Shift worked:  7a-7p     Shift summary and any significant changes:     D/c tomorrow with home O2     Concerns for physician to address:  none     Zone phone for oncoming shift:           Activity:  Activity Level: Up with Assistance  Number times ambulated in hallways past shift: 0  Number of times OOB to chair past shift: 5    Cardiac:   Cardiac Monitoring: Yes      Cardiac Rhythm: Sinus Rhythm    Access:  Current line(s): PIV     Genitourinary:   Urinary status: voiding    Respiratory:   O2 Device: Nasal cannula  Chronic home O2 use?: YES  Incentive spirometer at bedside: NO  Actual Volume (ml): 250 ml    GI:  Last Bowel Movement Date: 02/19/23  Current diet:  DIET ONE TIME MESSAGE  ADULT ORAL NUTRITION SUPPLEMENT Breakfast, Dinner; Standard High Calorie/High Protein  ADULT DIET Regular; 1200 ml; Safety Tray; Safety Tray (Disposables)  Passing flatus: YES  Tolerating current diet: YES       Pain Management:   Patient states pain is manageable on current regimen: YES    Skin:  Kelvin Score: 20  Interventions: float heels, increase time out of bed, limit briefs, and nutritional support     Patient Safety:  Fall Score:  Total Score: 1  Interventions: bed/chair alarm, assistive device (walker, cane, etc), gripper socks, pt to call before getting OOB, and stay with me (per policy)       Length of Stay:  Expected LOS: 3d 12h  Actual LOS: [address] [place] Shukri, RN

## 2023-02-21 VITALS
SYSTOLIC BLOOD PRESSURE: 168 MMHG | TEMPERATURE: 97.8 F | WEIGHT: 96.34 LBS | OXYGEN SATURATION: 91 % | DIASTOLIC BLOOD PRESSURE: 89 MMHG | RESPIRATION RATE: 21 BRPM | BODY MASS INDEX: 17.07 KG/M2 | HEIGHT: 63 IN | HEART RATE: 99 BPM

## 2023-02-21 LAB
ANION GAP SERPL CALC-SCNC: 5 MMOL/L (ref 5–15)
BUN SERPL-MCNC: 13 MG/DL (ref 6–20)
BUN/CREAT SERPL: 46 (ref 12–20)
CALCIUM SERPL-MCNC: 9 MG/DL (ref 8.5–10.1)
CHLORIDE SERPL-SCNC: 90 MMOL/L (ref 97–108)
CO2 SERPL-SCNC: 33 MMOL/L (ref 21–32)
CREAT SERPL-MCNC: 0.28 MG/DL (ref 0.55–1.02)
GLUCOSE SERPL-MCNC: 90 MG/DL (ref 65–100)
POTASSIUM SERPL-SCNC: 4.2 MMOL/L (ref 3.5–5.1)
SODIUM SERPL-SCNC: 128 MMOL/L (ref 136–145)

## 2023-02-21 PROCEDURE — 74011250636 HC RX REV CODE- 250/636: Performed by: INTERNAL MEDICINE

## 2023-02-21 PROCEDURE — 90686 IIV4 VACC NO PRSV 0.5 ML IM: CPT | Performed by: INTERNAL MEDICINE

## 2023-02-21 PROCEDURE — 74011250637 HC RX REV CODE- 250/637: Performed by: INTERNAL MEDICINE

## 2023-02-21 PROCEDURE — 74011250637 HC RX REV CODE- 250/637: Performed by: NURSE PRACTITIONER

## 2023-02-21 PROCEDURE — 36415 COLL VENOUS BLD VENIPUNCTURE: CPT

## 2023-02-21 PROCEDURE — 74011000636 HC RX REV CODE- 636: Performed by: INTERNAL MEDICINE

## 2023-02-21 PROCEDURE — 90471 IMMUNIZATION ADMIN: CPT

## 2023-02-21 PROCEDURE — 74011000250 HC RX REV CODE- 250: Performed by: NURSE PRACTITIONER

## 2023-02-21 PROCEDURE — 77010033678 HC OXYGEN DAILY

## 2023-02-21 PROCEDURE — 74011636637 HC RX REV CODE- 636/637: Performed by: INTERNAL MEDICINE

## 2023-02-21 PROCEDURE — 80048 BASIC METABOLIC PNL TOTAL CA: CPT

## 2023-02-21 PROCEDURE — 97535 SELF CARE MNGMENT TRAINING: CPT

## 2023-02-21 PROCEDURE — 94640 AIRWAY INHALATION TREATMENT: CPT

## 2023-02-21 PROCEDURE — 74011000250 HC RX REV CODE- 250: Performed by: INTERNAL MEDICINE

## 2023-02-21 RX ORDER — IPRATROPIUM BROMIDE AND ALBUTEROL SULFATE 2.5; .5 MG/3ML; MG/3ML
3 SOLUTION RESPIRATORY (INHALATION) EVERY 6 HOURS
Qty: 360 ML | Refills: 2 | Status: SHIPPED | OUTPATIENT
Start: 2023-02-21 | End: 2023-03-23

## 2023-02-21 RX ORDER — SODIUM CHLORIDE 1 G/1
1 TABLET ORAL 2 TIMES DAILY
Qty: 60 TABLET | Refills: 2 | Status: SHIPPED | OUTPATIENT
Start: 2023-02-21 | End: 2023-03-23

## 2023-02-21 RX ORDER — IBUPROFEN 200 MG
1 TABLET ORAL DAILY
Qty: 30 PATCH | Refills: 2 | Status: SHIPPED | OUTPATIENT
Start: 2023-02-21 | End: 2023-03-23

## 2023-02-21 RX ORDER — FUROSEMIDE 20 MG/1
20 TABLET ORAL EVERY OTHER DAY
Qty: 45 TABLET | Refills: 3 | Status: SHIPPED | OUTPATIENT
Start: 2023-02-21 | End: 2023-02-21 | Stop reason: SDUPTHER

## 2023-02-21 RX ORDER — FUROSEMIDE 20 MG/1
20 TABLET ORAL EVERY OTHER DAY
Status: DISCONTINUED | OUTPATIENT
Start: 2023-02-21 | End: 2023-02-21 | Stop reason: HOSPADM

## 2023-02-21 RX ORDER — IPRATROPIUM BROMIDE AND ALBUTEROL SULFATE 2.5; .5 MG/3ML; MG/3ML
3 SOLUTION RESPIRATORY (INHALATION) EVERY 6 HOURS
Qty: 360 ML | Refills: 2 | Status: SHIPPED | OUTPATIENT
Start: 2023-02-21 | End: 2023-02-21 | Stop reason: SDUPTHER

## 2023-02-21 RX ORDER — METOPROLOL TARTRATE 25 MG/1
25 TABLET, FILM COATED ORAL EVERY 12 HOURS
Qty: 60 TABLET | Refills: 2 | Status: SHIPPED | OUTPATIENT
Start: 2023-02-21 | End: 2023-03-23

## 2023-02-21 RX ORDER — ASPIRIN 325 MG/1
100 TABLET, FILM COATED ORAL DAILY
Qty: 30 TABLET | Refills: 2 | Status: SHIPPED | OUTPATIENT
Start: 2023-02-21 | End: 2023-03-23

## 2023-02-21 RX ORDER — PREDNISONE 10 MG/1
TABLET ORAL
Qty: 32 TABLET | Refills: 0 | Status: SHIPPED | OUTPATIENT
Start: 2023-02-21 | End: 2023-03-07

## 2023-02-21 RX ORDER — FUROSEMIDE 20 MG/1
20 TABLET ORAL EVERY OTHER DAY
Qty: 45 TABLET | Refills: 3 | Status: SHIPPED | OUTPATIENT
Start: 2023-02-21

## 2023-02-21 RX ORDER — FOLIC ACID 1 MG/1
1 TABLET ORAL DAILY
Qty: 30 TABLET | Refills: 2 | Status: SHIPPED | OUTPATIENT
Start: 2023-02-21 | End: 2023-03-23

## 2023-02-21 RX ADMIN — Medication 1000 MG: at 17:51

## 2023-02-21 RX ADMIN — LEVALBUTEROL HYDROCHLORIDE 1.25 MG: 1.25 SOLUTION RESPIRATORY (INHALATION) at 01:39

## 2023-02-21 RX ADMIN — THERA TABS 1 TABLET: TAB at 10:03

## 2023-02-21 RX ADMIN — CASTOR OIL AND BALSAM, PERU: 788; 87 OINTMENT TOPICAL at 10:07

## 2023-02-21 RX ADMIN — LEVALBUTEROL HYDROCHLORIDE 1.25 MG: 1.25 SOLUTION RESPIRATORY (INHALATION) at 13:44

## 2023-02-21 RX ADMIN — ENOXAPARIN SODIUM 30 MG: 100 INJECTION SUBCUTANEOUS at 10:03

## 2023-02-21 RX ADMIN — GUAIFENESIN 1200 MG: 600 TABLET, EXTENDED RELEASE ORAL at 17:51

## 2023-02-21 RX ADMIN — INFLUENZA VIRUS VACCINE 0.5 ML: 15; 15; 15; 15 SUSPENSION INTRAMUSCULAR at 17:51

## 2023-02-21 RX ADMIN — LEVALBUTEROL HYDROCHLORIDE 1.25 MG: 1.25 SOLUTION RESPIRATORY (INHALATION) at 08:50

## 2023-02-21 RX ADMIN — FUROSEMIDE 20 MG: 20 TABLET ORAL at 10:03

## 2023-02-21 RX ADMIN — BUDESONIDE INHALATION 500 MCG: 0.5 SUSPENSION RESPIRATORY (INHALATION) at 08:49

## 2023-02-21 RX ADMIN — GUAIFENESIN 1200 MG: 600 TABLET, EXTENDED RELEASE ORAL at 10:03

## 2023-02-21 RX ADMIN — SODIUM CHLORIDE, PRESERVATIVE FREE 10 ML: 5 INJECTION INTRAVENOUS at 06:06

## 2023-02-21 RX ADMIN — THIAMINE HCL TAB 100 MG 100 MG: 100 TAB at 10:03

## 2023-02-21 RX ADMIN — METOPROLOL TARTRATE 25 MG: 25 TABLET, FILM COATED ORAL at 10:03

## 2023-02-21 RX ADMIN — FOLIC ACID 1 MG: 1 TABLET ORAL at 10:03

## 2023-02-21 RX ADMIN — Medication 1000 MG: at 10:03

## 2023-02-21 RX ADMIN — SODIUM CHLORIDE, PRESERVATIVE FREE 10 ML: 5 INJECTION INTRAVENOUS at 14:00

## 2023-02-21 RX ADMIN — PREDNISONE 40 MG: 20 TABLET ORAL at 10:03

## 2023-02-21 RX ADMIN — ALPRAZOLAM 0.5 MG: 0.5 TABLET ORAL at 01:16

## 2023-02-21 NOTE — PROGRESS NOTES
DISCHARGE SUMMARY FROM [facility] NURSE    The patient is stable for discharge. I have reviewed the discharge instructions with the . The patient verbalized understanding. All questions were fully answered. The patient verbalized no complaints. Hard scripts and medication handouts were given and reviewed with the patient. Appropriate educational materials and medication side effects teaching were also provided. Cardiac monitor and IV line(s) were removed. The following personal items collected during admission were returned to the patient/family  Home medications:   Dental Appliance:    Vision: Visual Aid: None  Hearing Aid: Hearing Aid: None  Jewelry:    Clothing:    Other Valuables:    Valuables sent to safe: There were no personal belongings, valuables or home medications left at patient's bedside,  or safe.

## 2023-02-21 NOTE — PROGRESS NOTES
End of Shift Note    Bedside shift change report given to JORGE Robbins (oncoming nurse) by Gatito Hernadez RN (offgoing nurse).   Report included the following information SBAR, Kardex, ED Summary, Intake/Output, MAR, and Recent Results    Shift worked:  night     Shift summary and any significant changes:     Pt rested comfortably, remains on 1L NC to be discharged with     Expected discharge today     Concerns for physician to address:  none     Zone phone for oncoming shift:   xxx       Gatito Hernadez RN

## 2023-02-21 NOTE — DISCHARGE SUMMARY
Hospitalist Discharge Summary     Patient ID:  Fernando Cazares  [id number]  61 y.o.  1965 2/6/2023    PCP on record: None    Admit date: 2/6/2023  Discharge date and time: 2/21/2023    DISCHARGE DIAGNOSIS:  Acute hypoxic respiratory failure, POA--prior hospitalist notes pt unable to afford O2  COPD exacerbation, POA  Multiple rib fractures, POA  Acute on chronic hyponatremia, POA  Suicidal ideation  Alcohol abuse  Follow-up sinus tachycardia  Nicotine use    CONSULTATIONS:  IP CONSULT TO NEPHROLOGY  IP CONSULT TO PSYCHIATRY  IP CONSULT TO PSYCHIATRY  IP CONSULT TO PSYCHIATRY  IP CONSULT TO PSYCHIATRY    Excerpted HPI from H&P of Renate Mata, DO:  Fernando Cazares is a 61 y.o.  female who presents with SOB that started around 3 pm. EMS was called and per EMS report, patient was drinking and smoking when they arrived. Documented O2 sat was 89%% on RA and she was placed on NC 2 L. Patient denied any other symptoms including fever, chills, headache, CP, nausea, vomiting, bowel and bladder habit changes. Patient has history of COPD and was unable to have recent medication refill including her inhaler. She was also trying to cut down on her alcohol intake but reportedly still drinks 2 40 oz beer on a daily basis. Patient has just received Ativan per [facility] protocol close to the time of assessment and was too sleepy to answer assessment questions. Most of the information was taken form ED and EMS documentation. We were asked to admit for work up and evaluation of the above problems. Past medical History  COPD  ETOH abuse  Nicotine abuse     ____________________________________________________________________  DISCHARGE SUMMARY/HOSPITAL COURSE:  for full details see H&P, daily progress notes, labs, consult notes.      Acute hypoxic respiratory failure, POA--prior hospitalist notes pt unable to afford O2  COPD exacerbation, POA  Multiple rib fractures, POA  -EKG on admission without no acute ischemic changes  -CTA with no PE. Acute right ninth rib fracture, multiple bilateral remote rib fractures  -breathing treatments  -steroids with goal to wean off, currently po prednisone  -still on supplemental O2 with goal to wean off, pt is uninsured with large cost  -still desats on exertion on RA     Acute on chronic hyponatremia, POA  -Presented with sodium 118, baseline around 126  -Nephrology help appreciated, was given tolvaptan  -cont salt tablets  -started on low dose lasix every other day  -Check BMP in one week     Suicidal ideation noted by prior hospitalist  - Suicide precautions, constant observation  - Will not need to be involuntary committed but can by voluntary committed if patient chooses. She is leaning towards not going to inpatient psych noted by prior hospitalist  -monitor, pt currently is calm and denies any si/hi/hallucinations  -pscyh consulted  -pt does not voice si/hi/hallucinations     Alcohol abuse  -no withdrawal symptoms observed  -resume to monitor     Sinus tachycardia  -asymptomatic  -controlled on metoprolol     Nicotine use  -counseling was provided about smoking cessation by prior hospitalist  -nicotine patch     Incidental finding on CTA chest  Compression of multiple vertebral bodies. Partial atelectasis/scarring in the right middle lobe. Partially opacified bronchioles supplying the posterior aspect of the left  lower lobe suggesting mucus plugging and/or bronchitis. Will need close follow-up as an outpatient       _______________________________________________________________________  Patient seen and examined by me on discharge day. Pertinent Findings:  Gen:    Not in distress  Chest: Clear lungs  CVS:   Regular rhythm.   No edema  Abd/: Soft, not distended, not tender  Neuro:  Alert, oriented   _______________________________________________________________________  DISCHARGE MEDICATIONS:   Current Discharge Medication List        START taking these medications    Details   predniSONE (DELTASONE) 10 mg tablet Take 40 mg by mouth daily (with breakfast) for 2 days, THEN 30 mg daily (with breakfast) for 4 days, THEN 20 mg daily (with breakfast) for 4 days, THEN 10 mg daily (with breakfast) for 4 days. Qty: 32 Tablet, Refills: 0  Start date: 2/21/2023, End date: 4/4/9276      folic acid (FOLVITE) 1 mg tablet Take 1 Tablet by mouth daily for 30 days. Qty: 30 Tablet, Refills: 2  Start date: 2/21/2023, End date: 3/23/2023      furosemide (LASIX) 20 mg tablet Take 1 Tablet by mouth every other day. Qty: 45 Tablet, Refills: 3  Start date: 2/21/2023      metoprolol tartrate (LOPRESSOR) 25 mg tablet Take 1 Tablet by mouth every twelve (12) hours for 30 days. Qty: 60 Tablet, Refills: 2  Start date: 2/21/2023, End date: 3/23/2023      nicotine (NICODERM CQ) 21 mg/24 hr 1 Patch by TransDERmal route daily for 30 days. Qty: 30 Patch, Refills: 2  Start date: 2/21/2023, End date: 3/23/2023      sodium chloride 1,000 mg soluble tablet Take 1 Tablet by mouth two (2) times a day for 30 days. Qty: 60 Tablet, Refills: 2  Start date: 2/21/2023, End date: 3/23/2023      thiamine mononitrate (B-1) 100 mg tablet Take 1 Tablet by mouth daily for 30 days. Qty: 30 Tablet, Refills: 2  Start date: 2/21/2023, End date: 3/23/2023      albuterol-ipratropium (DUO-NEB) 2.5 mg-0.5 mg/3 ml nebu 3 mL by Nebulization route every six (6) hours for 30 days. Qty: 360 mL, Refills: 2  Start date: 2/21/2023, End date: 3/23/2023      [facility], Outpatient physical and occupational therapy  Qty: 1 Each, Refills: 0  Start date: 2/21/2023               Patient Follow Up Instructions: Activity: Activity as tolerated  Diet: DIET ONE TIME MESSAGE  ADULT ORAL NUTRITION SUPPLEMENT Breakfast, Dinner; Standard High Calorie/High Protein  ADULT DIET Regular; 1200 ml; Safety Tray; Safety Tray (Disposables)  Wound Care: None needed  Follow-up with PCP in  1 week.   Follow-up tests/labs  BMP in one week  If you have any concerns that you feel you need to come back to the hospital, please do not hesitate. Follow-up Information       Follow up With Specialties Details Why [address]  Follow up Mental Health follow-up [phone]  [address] Medicaid  Follow up  Call [phone] to apply for [place] by phone or   [email or web address]    [facility]  Follow up or [place] [address] location - will need to complete financial aid application and bring income verification info to take to your first appointment - once qualified then you will be set up with a PCP at clinic [address] [place] [address] Road [address] [facility] DME  Follow up home oxygen - only 3 month coverage - please call for delivery of concentrator to home [address]  [phone]    None    None ([phone]) Patient stated that they have no PCP      Alcides Reilly MD Nephrology Follow up in 2 week(s)  [facility].  [phone]      [facility]  Follow up phone# [phone] Medicaid resource          ________________________________________________________________    Risk of deterioration: Moderate    Condition at Discharge:  Stable  __________________________________________________________________    Disposition  Home with family and home health services    ____________________________________________________________________    Code Status: Full Code  ___________________________________________________________________      Total time in minutes spent coordinating this discharge (includes going over instructions, follow-up, prescriptions, and preparing report for sign off to her PCP) :  42 minutes    Signed:  Garcia Ayala MD

## 2023-02-21 NOTE — PROGRESS NOTES
Transition of Care Plan:     RUR: 9%   Disposition: Return to home with OP PT/OT script -     [id number] for cost out of medications at this time      [facility] - referral sent for home O2 for hospital to cover only for 3 months - pending acceptance     1:00 pm CM sent message to MD to send scripts to [address] and [address] [facility] does not have all the meds in stock for jet neb meds- CM awaiting for [facility] to cost out the meds at this time   If SNF or IPR: Date FOC offered:  Date FOC received:  Date authorization started with reference number:  Date authorization received and expires:  Accepting facility:  Follow up appointments: [facility]   DME needed: rolling walker   Transportation at Discharge: friend   Palma [place] or means to access home:      patient/ brother  IM Medicare Letter:  Is patient a [place] and connected with the [place]? If yes, was [employer] transfer form completed and VA notified? Caregiver Contact:     Hortencia Scott mother [phone]  Beverly Arnold friend [phone]        Discharge Caregiver contacted prior to discharge? Per patient  Care Conference needed?:          no    Patient has been given MH resources, [facility] info for PCP follow-up and assist with meds- has Home O2 and hospital to cover ONLY 3 months- [facility] informed CM that all paperwork has been signed by patient - informed patient that she will need to provide verifications at some point to [facility] [address] - patient has housing resources provided. Brother or friend to transport to home.  ABIGAIL Orellana

## 2023-02-21 NOTE — PROGRESS NOTES
Problem: Pressure Injury - Risk of  Goal: *Prevention of pressure injury  Description: Document Kelvin Scale and appropriate interventions in the flowsheet. 2/21/2023 1856 by Zaria Sen RN  Outcome: Resolved/Met  Note: Pressure Injury Interventions:  Sensory Interventions: Assess changes in LOC, Discuss PT/OT consult with provider    Moisture Interventions: Absorbent underpads, Apply protective barrier, creams and emollients, Assess need for specialty bed    Activity Interventions: Increase time out of bed    Mobility Interventions: PT/OT evaluation, HOB 30 degrees or less    Nutrition Interventions: Document food/fluid/supplement intake    Friction and Shear Interventions: Apply protective barrier, creams and emollients, Feet elevated on foot rest, HOB 30 degrees or less             2/21/2023 0754 by Zaria Sen RN  Outcome: Progressing Towards Goal  Note: Pressure Injury Interventions:  Sensory Interventions: Assess changes in LOC, Keep linens dry and wrinkle-free, Maintain/enhance activity level, Minimize linen layers    Moisture Interventions: Absorbent underpads, Maintain skin hydration (lotion/cream), Minimize layers, Offer toileting Q_hr    Activity Interventions: Increase time out of bed    Mobility Interventions: HOB 30 degrees or less    Nutrition Interventions: Document food/fluid/supplement intake, Discuss nutritional consult with provider, Offer support with meals,snacks and hydration    Friction and Shear Interventions: HOB 30 degrees or less, Minimize layers                Problem: Patient Education: Go to Patient Education Activity  Goal: Patient/Family Education  2/21/2023 1856 by Zaria Sen RN  Outcome: Resolved/Met  2/21/2023 0754 by Zaria Sen RN  Outcome: Progressing Towards Goal     Problem: Falls - Risk of  Goal: *Absence of Falls  Description: Document Caitlin Fall Risk and appropriate interventions in the flowsheet.   2/21/2023 1856 by Zaria Sen RN  Outcome: Resolved/Met  Note: Fall Risk Interventions:            Medication Interventions: Patient to call before getting OOB                2/21/2023 0754 by Tam Claire RN  Outcome: Progressing Towards Goal  Note: Fall Risk Interventions:            Medication Interventions: Patient to call before getting OOB                   Problem: Patient Education: Go to Patient Education Activity  Goal: Patient/Family Education  2/21/2023 1856 by Tam Claire RN  Outcome: Resolved/Met  2/21/2023 0754 by Tam Claire RN  Outcome: Progressing Towards Goal     Problem: Risk for Elopement  Goal: Patient will not exit the unit/facility without proper escort  2/21/2023 1856 by Tam Claire RN  Outcome: Resolved/Met  2/21/2023 0754 by Tam Claire RN  Outcome: Progressing Towards Goal     Problem: Breathing Pattern - Ineffective  Goal: *Absence of hypoxia  2/21/2023 1856 by Tam Claire RN  Outcome: Resolved/Met  2/21/2023 0754 by Tam Claire RN  Outcome: Progressing Towards Goal  Goal: *Use of effective breathing techniques  Outcome: Resolved/Met  Goal: *PALLIATIVE CARE:  Alleviation of Dyspnea  Outcome: Resolved/Met     Problem: Patient Education: Go to Patient Education Activity  Goal: Patient/Family Education  Outcome: Resolved/Met     Problem: Patient Education: Go to Patient Education Activity  Goal: Patient/Family Education  Outcome: Resolved/Met     Problem: Patient Education: Go to Patient Education Activity  Goal: Patient/Family Education  Outcome: Resolved/Met     Problem: Pain  Goal: *Control of Pain  Outcome: Resolved/Met  Goal: *PALLIATIVE CARE:  Alleviation of Pain  Outcome: Resolved/Met     Problem: Patient Education: Go to Patient Education Activity  Goal: Patient/Family Education  Outcome: Resolved/Met     Problem: Chronic Obstructive Pulmonary Disease (COPD)  Goal: *Oxygen saturation during activity within specified parameters  Outcome: Resolved/Met  Goal: *Able to remain out of bed as prescribed  Outcome: Resolved/Met  Goal: *Absence of hypoxia  Outcome: Resolved/Met  Goal: *Optimize nutritional status  Outcome: Resolved/Met     Problem: Patient Education: Go to Patient Education Activity  Goal: Patient/Family Education  Outcome: Resolved/Met

## 2023-02-21 NOTE — PROGRESS NOTES
Problem: Pressure Injury - Risk of  Goal: *Prevention of pressure injury  Description: Document Kelvin Scale and appropriate interventions in the flowsheet. Outcome: Progressing Towards Goal  Note: Pressure Injury Interventions:  Sensory Interventions: Assess changes in LOC, Keep linens dry and wrinkle-free, Maintain/enhance activity level, Minimize linen layers    Moisture Interventions: Absorbent underpads, Maintain skin hydration (lotion/cream), Minimize layers, Offer toileting Q_hr    Activity Interventions: Increase time out of bed    Mobility Interventions: HOB 30 degrees or less    Nutrition Interventions: Document food/fluid/supplement intake, Discuss nutritional consult with provider, Offer support with meals,snacks and hydration    Friction and Shear Interventions: HOB 30 degrees or less, Minimize layers                Problem: Patient Education: Go to Patient Education Activity  Goal: Patient/Family Education  Outcome: Progressing Towards Goal     Problem: Falls - Risk of  Goal: *Absence of Falls  Description: Document Caitlin Fall Risk and appropriate interventions in the flowsheet.   Outcome: Progressing Towards Goal  Note: Fall Risk Interventions:            Medication Interventions: Patient to call before getting OOB                   Problem: Patient Education: Go to Patient Education Activity  Goal: Patient/Family Education  Outcome: Progressing Towards Goal     Problem: Risk for Elopement  Goal: Patient will not exit the unit/facility without proper escort  Outcome: Progressing Towards Goal     Problem: Breathing Pattern - Ineffective  Goal: *Absence of hypoxia  Outcome: Progressing Towards Goal

## 2023-02-21 NOTE — PROGRESS NOTES
Problem: Self Care Deficits Care Plan (Adult)  Goal: *Acute Goals and Plan of Care (Insert Text)  Description: FUNCTIONAL STATUS PRIOR TO ADMISSION: Patient questionable historian secondary to mild confusion but reported being independent in ADLs and functional mobility. HOME SUPPORT: The patient lived with mother. Per chart review, she is her mother's caregiver. Occupational Therapy Goals  Initiated 2/8/2023; Weekly Re-assessment 2/16/2023- Continue all goals  1. Patient will perform grooming standing at sink with modified independence within 7 day(s). 2.  Patient will perform upper body dressing with modified independence within 7 day(s). 3.  Patient will perform lower body dressing with modified independence within 7 day(s). 4.  Patient will perform toilet transfers with modified independence within 7 day(s). 5.  Patient will perform all aspects of toileting with modified independence within 7 day(s). Outcome: Progressing Towards Goal   OCCUPATIONAL THERAPY TREATMENT  Patient: Ashlyn Alegre (58 y.o. female)  Date: 2/21/2023  Diagnosis: Acute respiratory distress [R06.03]  Hyponatremia [E87.1] <principal problem not specified>      Precautions: Fall, Bed Alarm, Seizure (O2 use is new)  Chart, occupational therapy assessment, plan of care, and goals were reviewed. ASSESSMENT  Patient continues with skilled OT services and is progressing towards goals. Patient anxious with elevated BP, regarding toileting in the home, need for stairs etc. More calm by end of session with variety of potential problem solving solutions for toileting reviewed. (No bathroom on level where family has placed bed for her) [facility] as well.  Will need BSC and RW    Current Level of Function Impacting Discharge (ADLs): SBA self care and functional mobility, unable to manage large O2 tank and RW; will benefit from continued therapy participation    Other factors to consider for discharge:   Vitals:    02/21/23 1115 02/21/23 1344 02/21/23 1517 02/21/23 1719   BP: (!) 144/71  (!) 130/99 (!) 168/89   BP 1 Location: Left upper arm  Left upper arm Left upper arm   BP Patient Position: At rest  At rest Sitting  Comment: anxious re discharge   Pulse: 89  (!) 102 99   Temp: 97.9 °F (36.6 °C)  97.8 °F (36.6 °C)    Resp: 16  21    Height:       Weight:       SpO2: 96% 96% 97% 91%  Comment: 1L O2             PLAN :  Patient continues to benefit from skilled intervention to address the above impairments. Continue treatment per established plan of care to address goals. Recommend with staff: up to chair for all meals    Recommend next OT session: up to bathroom w/RW    Recommendation for discharge: (in order for the patient to meet his/her long term goals)  Occupational therapy at least 2 days/week in the home AND ensure assist and/or supervision for safety with IADLs, toileting    This discharge recommendation:  Has been made in collaboration with the attending provider and/or case management    IF patient discharges home will need the following DME: bedside commode, portable oxygen, and walker: rolling       SUBJECTIVE:   Patient stated My friend is coming to get me.     OBJECTIVE DATA SUMMARY:   Cognitive/Behavioral Status:  Neurologic State: Alert  Orientation Level: Oriented X4  Cognition: Follows commands  Perception: Appears intact  Perseveration: No perseveration noted  Safety/Judgement: Fall prevention; Awareness of environment; Insight into deficits; Decreased insight into deficits    Functional Mobility and Transfers for ADLs:  Bed Mobility:  Rolling: Modified independent  Supine to Sit: Modified independent  Sit to Supine: Modified independent    Transfers:  Sit to Stand: Stand-by assistance; Additional time; Adaptive equipment (O2 line management training needed)  Functional Transfers  Toilet Transfer : Stand-by assistance;Supervision; Additional time; Adaptive equipment (BSC needed in the home due to no bathroom access)       Balance:  Sitting: Intact  Standing: Impaired; Without support  Standing - Static: Constant support;Good  Standing - Dynamic : Fair;Constant support    ADL Intervention:  Feeding  Feeding Assistance: Modified independent    Grooming  Grooming Assistance: Stand-by assistance (recommend seated due to energy conservation needs)              Lower Body Bathing  Bathing Assistance: Stand-by assistance;Supervision    Upper Body Dressing Assistance  Dressing Assistance: Stand-by assistance;Supervision    Lower Body Dressing Assistance  Dressing Assistance: Stand-by assistance;Supervision    Toileting  Clothing Management: Contact guard assistance (greatly stressed over need for diaper due to no bathroom on floor level where brother has her bed \"I cant do stairs yet; maybe 1x a day but not every time. \"  Does not think she has BSC; needs one; does not know how she will empty it)    Cognitive Retraining  Attention to Task: Single task  Safety/Judgement: Fall prevention; Awareness of environment; Insight into deficits; Decreased insight into deficits      Pain:  Not reporting pain this session, just fatigue    Activity Tolerance:   Fair, Poor, desaturates with exertion and requires oxygen, requires frequent rest breaks, and observed SOB with activity    After treatment patient left in no apparent distress:   Call bell within reach, Side rails x 3, and seated edge of bed w/meal    COMMUNICATION/COLLABORATION:   The patients plan of care was discussed with: Registered nurse.      Ciara Novoa OTR/L  Time Calculation: 27 mins

## 2023-02-21 NOTE — PROGRESS NOTES
Problem: Pressure Injury - Risk of  Goal: *Prevention of pressure injury  Description: Document Kelvin Scale and appropriate interventions in the flowsheet. Outcome: Progressing Towards Goal  Note: Pressure Injury Interventions:  Sensory Interventions: Assess changes in LOC, Keep linens dry and wrinkle-free, Maintain/enhance activity level, Minimize linen layers    Moisture Interventions: Absorbent underpads, Maintain skin hydration (lotion/cream), Minimize layers, Offer toileting Q_hr    Activity Interventions: Increase time out of bed    Mobility Interventions: HOB 30 degrees or less, PT/OT evaluation, Float heels    Nutrition Interventions: Document food/fluid/supplement intake, Offer support with meals,snacks and hydration    Friction and Shear Interventions: HOB 30 degrees or less, Minimize layers    Problem: Falls - Risk of  Goal: *Absence of Falls  Description: Document Caitlin Fall Risk and appropriate interventions in the flowsheet.   Outcome: Progressing Towards Goal  Note: Fall Risk Interventions:       Medication Interventions: Patient to call before getting OOB       Problem: Risk for Elopement  Goal: Patient will not exit the unit/facility without proper escort  Outcome: Progressing Towards Goal     Problem: Breathing Pattern - Ineffective  Goal: *Absence of hypoxia  Outcome: Progressing Towards Goal  Goal: *Use of effective breathing techniques  Outcome: Progressing Towards Goal     Problem: Pain  Goal: *Control of Pain  Outcome: Progressing Towards Goal     Problem: Chronic Obstructive Pulmonary Disease (COPD)  Goal: *Oxygen saturation during activity within specified parameters  Outcome: Progressing Towards Goal  Goal: *Able to remain out of bed as prescribed  Outcome: Progressing Towards Goal  Goal: *Absence of hypoxia  Outcome: Progressing Towards Goal  Goal: *Optimize nutritional status  Outcome: Progressing Towards Goal

## 2023-02-21 NOTE — PROGRESS NOTES
[facility]  Progress Note        NAME: Rasheeda Hernandez       :  1965       MRN:  [id number]     Date/Time: 2023    Risk of deterioration: medium       Assessment:    Plan:  Acute on chronic hyponatremia-likely siadh and high fluid intake and beer potomania     ETOH use (heavy beer intake)  HTN  COPD   Sodium marla 118 on , up to 131, now is 129 to 128; s/p tolvaptan (last dose was ). Continue 1200 ml/day FR and salt tab bid; will add low dose Lasix 20 mg PO qod    Goal sodium ~ 130. Stable for discharge from my standpoint. Will arrange outpatient follow up on discharge             Subjective:     Chief Complaint:  \"I feel better. \"  No N/V. No dyspnea. No pain. We discussed the above        Objective:     VITALS:   Last 24hrs VS reviewed since prior progress note. Most recent are:  Visit Vitals  /82   Pulse 100   Temp 98.6 °F (37 °C)   Resp 16   Ht 5' 3\" (1.6 m)   Wt 43.7 kg (96 lb 5.5 oz)   SpO2 100%   BMI 17.07 kg/m²     SpO2 Readings from Last 6 Encounters:   23 100%    O2 Flow Rate (L/min): 1 l/min   No intake or output data in the 24 hours ending 23 0834       Telemetry Reviewed       PHYSICAL EXAM:    NAD  No edema     Lab Data Reviewed: (see below)    Medications Reviewed: (see below)    PMH/SH reviewed - no change compared to H&P________________    Attending Physician:  Renato Cash MD     ____________________________________________________  MEDICATIONS:  Current Facility-Administered Medications   Medication Dose Route Frequency    influenza vaccine  (6 mos+)(PF) (FLUARIX/FLULAVAL/FLUZONE QUAD) injection 0.5 mL  1 Each IntraMUSCular PRIOR TO DISCHARGE    predniSONE (DELTASONE) tablet 40 mg  40 mg Oral DAILY WITH BREAKFAST    budesonide (PULMICORT) 500 mcg/2 ml nebulizer suspension  500 mcg Nebulization BID RT    ALPRAZolam (XANAX) tablet 0.5 mg  0.5 mg Oral QID PRN    butalbital-acetaminophen-caffeine (FIORICET, ESGIC) -40 mg per tablet 1 Tablet  1 Tablet Oral Q6H PRN    guaiFENesin ER (MUCINEX) tablet 1,200 mg  1,200 mg Oral BID    levalbuterol (XOPENEX) nebulizer soln 1.25 mg/3 mL  1.25 mg Nebulization Q6H RT    metoprolol tartrate (LOPRESSOR) tablet 25 mg  25 mg Oral Q12H    enoxaparin (LOVENOX) injection 30 mg  30 mg SubCUTAneous DAILY    sodium chloride (OCEAN) 0.65 % nasal squeeze bottle 2 Spray  2 Spray Both Nostrils Q2H PRN    sodium chloride soluble tablet 1,000 mg  1 g Oral BID    therapeutic multivitamin (THERAGRAN) tablet 1 Tablet  1 Tablet Oral DAILY    folic acid (FOLVITE) tablet 1 mg  1 mg Oral DAILY    thiamine mononitrate (B-1) tablet 100 mg  100 mg Oral DAILY    sodium chloride (NS) flush 5-40 mL  5-40 mL IntraVENous Q8H    sodium chloride (NS) flush 5-40 mL  5-40 mL IntraVENous PRN    acetaminophen (TYLENOL) tablet 650 mg  650 mg Oral Q6H PRN    Or    acetaminophen (TYLENOL) suppository 650 mg  650 mg Rectal Q6H PRN    polyethylene glycol (MIRALAX) packet 17 g  17 g Oral DAILY PRN    ondansetron (ZOFRAN ODT) tablet 4 mg  4 mg Oral Q8H PRN    Or    ondansetron (ZOFRAN) injection 4 mg  4 mg IntraVENous Q6H PRN    ipratropium (ATROVENT) 0.02 % nebulizer solution 0.5 mg  0.5 mg Nebulization Q4H PRN    LORazepam (ATIVAN) injection 1 mg  1 mg IntraVENous Q2H PRN    LORazepam (ATIVAN) injection 2 mg  2 mg IntraVENous Q2H PRN    nicotine (NICODERM CQ) 21 mg/24 hr patch 1 Patch  1 Patch TransDERmal DAILY    balsam peru-castor oiL (VENELEX) ointment   Topical BID        LABS:  No results for input(s): WBC, HGB, HCT, PLT, HGBEXT, HCTEXT, PLTEXT, HGBEXT, HCTEXT, PLTEXT in the last 72 hours. Recent Labs     02/21/23  0116 02/20/23  0225 02/19/23  0335   * 129* 130*   K 4.2 4.0 3.8   CL 90* 93* 94*   CO2 33* 35* 35*   BUN 13 20 20   CREA 0.28* 0.31* 0.32*   GLU 90 116* 99   CA 9.0 8.7 9.2       No results for input(s): ALT, AP, TBIL, TBILI, TP, ALB, GLOB, GGT, AML, LPSE in the last 72 hours.     No lab exists for component: SGOT, GPT, AMYP, HLPSE    No results for input(s): INR, PTP, APTT, INREXT, INREXT in the last 72 hours. No results for input(s): FE, TIBC, PSAT, FERR in the last 72 hours. No results for input(s): PH, PCO2, PO2 in the last 72 hours. No results for input(s): CPK, CKNDX, TROIQ in the last 72 hours.     No lab exists for component: CPKMB  No results found for: [facility]

## 2024-01-04 PROCEDURE — 36415 COLL VENOUS BLD VENIPUNCTURE: CPT

## 2024-01-04 PROCEDURE — 80048 BASIC METABOLIC PNL TOTAL CA: CPT

## 2024-01-05 ENCOUNTER — HOSPITAL ENCOUNTER (OUTPATIENT)
Facility: HOSPITAL | Age: 59
Setting detail: SPECIMEN
Discharge: HOME | End: 2024-01-08

## 2024-01-05 LAB
ANION GAP SERPL CALC-SCNC: 3 MMOL/L (ref 5–15)
BUN SERPL-MCNC: 7 MG/DL (ref 6–20)
BUN/CREAT SERPL: 16 (ref 12–20)
CALCIUM SERPL-MCNC: 9.6 MG/DL (ref 8.5–10.1)
CHLORIDE SERPL-SCNC: 89 MMOL/L (ref 97–108)
CO2 SERPL-SCNC: 36 MMOL/L (ref 21–32)
CREAT SERPL-MCNC: 0.44 MG/DL (ref 0.55–1.02)
GLUCOSE SERPL-MCNC: 89 MG/DL (ref 65–100)
POTASSIUM SERPL-SCNC: 5.3 MMOL/L (ref 3.5–5.1)
SODIUM SERPL-SCNC: 128 MMOL/L (ref 136–145)

## 2024-02-13 ENCOUNTER — HOSPITAL ENCOUNTER (OUTPATIENT)
Facility: HOSPITAL | Age: 59
Setting detail: SPECIMEN
Discharge: HOME | End: 2024-02-16

## 2024-02-13 PROCEDURE — 36415 COLL VENOUS BLD VENIPUNCTURE: CPT

## 2024-02-13 PROCEDURE — 85025 COMPLETE CBC W/AUTO DIFF WBC: CPT

## 2024-02-13 PROCEDURE — 80053 COMPREHEN METABOLIC PANEL: CPT

## 2024-02-14 LAB
ALBUMIN SERPL-MCNC: 4.2 G/DL (ref 3.5–5)
ALBUMIN/GLOB SERPL: 1.4 (ref 1.1–2.2)
ALP SERPL-CCNC: 115 U/L (ref 45–117)
ALT SERPL-CCNC: 15 U/L (ref 12–78)
ANION GAP SERPL CALC-SCNC: 7 MMOL/L (ref 5–15)
AST SERPL-CCNC: 15 U/L (ref 15–37)
BASOPHILS # BLD: 0.1 K/UL (ref 0–0.1)
BASOPHILS NFR BLD: 1 % (ref 0–1)
BILIRUB SERPL-MCNC: 0.3 MG/DL (ref 0.2–1)
BUN SERPL-MCNC: 6 MG/DL (ref 6–20)
BUN/CREAT SERPL: 11 (ref 12–20)
CALCIUM SERPL-MCNC: 9.9 MG/DL (ref 8.5–10.1)
CHLORIDE SERPL-SCNC: 88 MMOL/L (ref 97–108)
CO2 SERPL-SCNC: 32 MMOL/L (ref 21–32)
CREAT SERPL-MCNC: 0.53 MG/DL (ref 0.55–1.02)
DIFFERENTIAL METHOD BLD: NORMAL
EOSINOPHIL # BLD: 0.3 K/UL (ref 0–0.4)
EOSINOPHIL NFR BLD: 5 % (ref 0–7)
ERYTHROCYTE [DISTWIDTH] IN BLOOD BY AUTOMATED COUNT: 12.1 % (ref 11.5–14.5)
GLOBULIN SER CALC-MCNC: 3.1 G/DL (ref 2–4)
GLUCOSE SERPL-MCNC: 98 MG/DL (ref 65–100)
HCT VFR BLD AUTO: 41.7 % (ref 35–47)
HGB BLD-MCNC: 13.3 G/DL (ref 11.5–16)
IMM GRANULOCYTES # BLD AUTO: 0 K/UL (ref 0–0.04)
IMM GRANULOCYTES NFR BLD AUTO: 0 % (ref 0–0.5)
LYMPHOCYTES # BLD: 2.3 K/UL (ref 0.8–3.5)
LYMPHOCYTES NFR BLD: 32 % (ref 12–49)
MCH RBC QN AUTO: 31.6 PG (ref 26–34)
MCHC RBC AUTO-ENTMCNC: 31.9 G/DL (ref 30–36.5)
MCV RBC AUTO: 99 FL (ref 80–99)
MONOCYTES # BLD: 0.6 K/UL (ref 0–1)
MONOCYTES NFR BLD: 9 % (ref 5–13)
NEUTS SEG # BLD: 3.9 K/UL (ref 1.8–8)
NEUTS SEG NFR BLD: 53 % (ref 32–75)
NRBC # BLD: 0 K/UL (ref 0–0.01)
NRBC BLD-RTO: 0 PER 100 WBC
PLATELET # BLD AUTO: 325 K/UL (ref 150–400)
PMV BLD AUTO: 10.7 FL (ref 8.9–12.9)
POTASSIUM SERPL-SCNC: 4.5 MMOL/L (ref 3.5–5.1)
PROT SERPL-MCNC: 7.3 G/DL (ref 6.4–8.2)
RBC # BLD AUTO: 4.21 M/UL (ref 3.8–5.2)
SODIUM SERPL-SCNC: 127 MMOL/L (ref 136–145)
WBC # BLD AUTO: 7.1 K/UL (ref 3.6–11)

## 2024-02-20 ENCOUNTER — HOSPITAL ENCOUNTER (OUTPATIENT)
Facility: HOSPITAL | Age: 59
Setting detail: SPECIMEN
Discharge: HOME | End: 2024-02-23

## 2024-02-20 PROCEDURE — 80048 BASIC METABOLIC PNL TOTAL CA: CPT

## 2024-02-20 PROCEDURE — 36415 COLL VENOUS BLD VENIPUNCTURE: CPT

## 2024-02-21 LAB
ANION GAP SERPL CALC-SCNC: 4 MMOL/L (ref 5–15)
BUN SERPL-MCNC: 21 MG/DL (ref 6–20)
BUN/CREAT SERPL: 36 (ref 12–20)
CALCIUM SERPL-MCNC: 10 MG/DL (ref 8.5–10.1)
CHLORIDE SERPL-SCNC: 87 MMOL/L (ref 97–108)
CO2 SERPL-SCNC: 32 MMOL/L (ref 21–32)
CREAT SERPL-MCNC: 0.58 MG/DL (ref 0.55–1.02)
GLUCOSE SERPL-MCNC: 98 MG/DL (ref 65–100)
POTASSIUM SERPL-SCNC: 4.7 MMOL/L (ref 3.5–5.1)
SODIUM SERPL-SCNC: 123 MMOL/L (ref 136–145)

## 2024-03-12 ENCOUNTER — HOSPITAL ENCOUNTER (OUTPATIENT)
Facility: HOSPITAL | Age: 59
Setting detail: SPECIMEN
Discharge: HOME | End: 2024-03-15

## 2024-03-12 PROCEDURE — 36415 COLL VENOUS BLD VENIPUNCTURE: CPT

## 2024-03-12 PROCEDURE — 80053 COMPREHEN METABOLIC PANEL: CPT

## 2024-03-13 LAB
ALBUMIN SERPL-MCNC: 3.9 G/DL (ref 3.5–5)
ALBUMIN/GLOB SERPL: 1.3 (ref 1.1–2.2)
ALP SERPL-CCNC: 104 U/L (ref 45–117)
ALT SERPL-CCNC: 14 U/L (ref 12–78)
ANION GAP SERPL CALC-SCNC: 1 MMOL/L (ref 5–15)
AST SERPL-CCNC: 13 U/L (ref 15–37)
BILIRUB SERPL-MCNC: 0.4 MG/DL (ref 0.2–1)
BUN SERPL-MCNC: 15 MG/DL (ref 6–20)
BUN/CREAT SERPL: 23 (ref 12–20)
CALCIUM SERPL-MCNC: 9.4 MG/DL (ref 8.5–10.1)
CHLORIDE SERPL-SCNC: 95 MMOL/L (ref 97–108)
CO2 SERPL-SCNC: 33 MMOL/L (ref 21–32)
CREAT SERPL-MCNC: 0.65 MG/DL (ref 0.55–1.02)
GLOBULIN SER CALC-MCNC: 3.1 G/DL (ref 2–4)
GLUCOSE SERPL-MCNC: 101 MG/DL (ref 65–100)
POTASSIUM SERPL-SCNC: 4.1 MMOL/L (ref 3.5–5.1)
PROT SERPL-MCNC: 7 G/DL (ref 6.4–8.2)
SODIUM SERPL-SCNC: 129 MMOL/L (ref 136–145)

## 2024-04-15 ENCOUNTER — APPOINTMENT (OUTPATIENT)
Facility: HOSPITAL | Age: 59
DRG: 644 | End: 2024-04-15

## 2024-04-15 ENCOUNTER — HOSPITAL ENCOUNTER (INPATIENT)
Facility: HOSPITAL | Age: 59
LOS: 8 days | Discharge: HOME | DRG: 644 | End: 2024-04-23
Attending: EMERGENCY MEDICINE | Admitting: FAMILY MEDICINE

## 2024-04-15 DIAGNOSIS — J44.1 COPD EXACERBATION (HCC): Primary | ICD-10-CM

## 2024-04-15 DIAGNOSIS — Z72.0 TOBACCO ABUSE: ICD-10-CM

## 2024-04-15 DIAGNOSIS — E87.1 HYPONATREMIA: ICD-10-CM

## 2024-04-15 LAB
ALBUMIN SERPL-MCNC: 3.6 G/DL (ref 3.5–5)
ALBUMIN/GLOB SERPL: 1.2 (ref 1.1–2.2)
ALP SERPL-CCNC: 94 U/L (ref 45–117)
ALT SERPL-CCNC: 16 U/L (ref 12–78)
ANION GAP SERPL CALC-SCNC: 7 MMOL/L (ref 5–15)
AST SERPL-CCNC: 26 U/L (ref 15–37)
BASOPHILS # BLD: 0 K/UL (ref 0–0.1)
BASOPHILS NFR BLD: 1 % (ref 0–1)
BILIRUB SERPL-MCNC: 0.7 MG/DL (ref 0.2–1)
BUN SERPL-MCNC: 8 MG/DL (ref 6–20)
BUN/CREAT SERPL: 21 (ref 12–20)
CALCIUM SERPL-MCNC: 8.7 MG/DL (ref 8.5–10.1)
CHLORIDE SERPL-SCNC: 79 MMOL/L (ref 97–108)
CO2 SERPL-SCNC: 33 MMOL/L (ref 21–32)
COMMENT:: NORMAL
CREAT SERPL-MCNC: 0.38 MG/DL (ref 0.55–1.02)
DIFFERENTIAL METHOD BLD: ABNORMAL
EKG ATRIAL RATE: 101 BPM
EKG DIAGNOSIS: NORMAL
EKG P AXIS: 74 DEGREES
EKG P-R INTERVAL: 160 MS
EKG Q-T INTERVAL: 354 MS
EKG QRS DURATION: 78 MS
EKG QTC CALCULATION (BAZETT): 459 MS
EKG R AXIS: 32 DEGREES
EKG T AXIS: 71 DEGREES
EKG VENTRICULAR RATE: 101 BPM
EOSINOPHIL # BLD: 0.1 K/UL (ref 0–0.4)
EOSINOPHIL NFR BLD: 2 % (ref 0–7)
ERYTHROCYTE [DISTWIDTH] IN BLOOD BY AUTOMATED COUNT: 12.1 % (ref 11.5–14.5)
FLUAV RNA SPEC QL NAA+PROBE: NOT DETECTED
FLUBV RNA SPEC QL NAA+PROBE: NOT DETECTED
GLOBULIN SER CALC-MCNC: 3.1 G/DL (ref 2–4)
GLUCOSE SERPL-MCNC: 112 MG/DL (ref 65–100)
HCT VFR BLD AUTO: 32.2 % (ref 35–47)
HGB BLD-MCNC: 11.1 G/DL (ref 11.5–16)
IMM GRANULOCYTES # BLD AUTO: 0 K/UL (ref 0–0.04)
IMM GRANULOCYTES NFR BLD AUTO: 0 % (ref 0–0.5)
LYMPHOCYTES # BLD: 1.8 K/UL (ref 0.8–3.5)
LYMPHOCYTES NFR BLD: 26 % (ref 12–49)
MCH RBC QN AUTO: 30.7 PG (ref 26–34)
MCHC RBC AUTO-ENTMCNC: 34.5 G/DL (ref 30–36.5)
MCV RBC AUTO: 89.2 FL (ref 80–99)
MONOCYTES # BLD: 0.6 K/UL (ref 0–1)
MONOCYTES NFR BLD: 9 % (ref 5–13)
NEUTS SEG # BLD: 4.2 K/UL (ref 1.8–8)
NEUTS SEG NFR BLD: 62 % (ref 32–75)
NRBC # BLD: 0 K/UL (ref 0–0.01)
NRBC BLD-RTO: 0 PER 100 WBC
NT PRO BNP: 129 PG/ML
PLATELET # BLD AUTO: 289 K/UL (ref 150–400)
PMV BLD AUTO: 10.5 FL (ref 8.9–12.9)
POTASSIUM SERPL-SCNC: 4.1 MMOL/L (ref 3.5–5.1)
PROT SERPL-MCNC: 6.7 G/DL (ref 6.4–8.2)
RBC # BLD AUTO: 3.61 M/UL (ref 3.8–5.2)
SARS-COV-2 RNA RESP QL NAA+PROBE: NOT DETECTED
SODIUM SERPL-SCNC: 119 MMOL/L (ref 136–145)
SPECIMEN HOLD: NORMAL
TROPONIN I SERPL HS-MCNC: 5 NG/L (ref 0–51)
WBC # BLD AUTO: 6.8 K/UL (ref 3.6–11)

## 2024-04-15 PROCEDURE — 6370000000 HC RX 637 (ALT 250 FOR IP): Performed by: NURSE PRACTITIONER

## 2024-04-15 PROCEDURE — 84484 ASSAY OF TROPONIN QUANT: CPT

## 2024-04-15 PROCEDURE — 2580000003 HC RX 258: Performed by: NURSE PRACTITIONER

## 2024-04-15 PROCEDURE — 6370000000 HC RX 637 (ALT 250 FOR IP): Performed by: EMERGENCY MEDICINE

## 2024-04-15 PROCEDURE — 71045 X-RAY EXAM CHEST 1 VIEW: CPT

## 2024-04-15 PROCEDURE — 99285 EMERGENCY DEPT VISIT HI MDM: CPT

## 2024-04-15 PROCEDURE — 83880 ASSAY OF NATRIURETIC PEPTIDE: CPT

## 2024-04-15 PROCEDURE — 94640 AIRWAY INHALATION TREATMENT: CPT

## 2024-04-15 PROCEDURE — 1100000000 HC RM PRIVATE

## 2024-04-15 PROCEDURE — 6360000002 HC RX W HCPCS: Performed by: EMERGENCY MEDICINE

## 2024-04-15 PROCEDURE — 96374 THER/PROPH/DIAG INJ IV PUSH: CPT

## 2024-04-15 PROCEDURE — 85025 COMPLETE CBC W/AUTO DIFF WBC: CPT

## 2024-04-15 PROCEDURE — 80053 COMPREHEN METABOLIC PANEL: CPT

## 2024-04-15 PROCEDURE — 6360000002 HC RX W HCPCS: Performed by: NURSE PRACTITIONER

## 2024-04-15 PROCEDURE — 94761 N-INVAS EAR/PLS OXIMETRY MLT: CPT

## 2024-04-15 PROCEDURE — 87636 SARSCOV2 & INF A&B AMP PRB: CPT

## 2024-04-15 PROCEDURE — 2580000003 HC RX 258: Performed by: EMERGENCY MEDICINE

## 2024-04-15 PROCEDURE — 93005 ELECTROCARDIOGRAM TRACING: CPT | Performed by: EMERGENCY MEDICINE

## 2024-04-15 PROCEDURE — 96361 HYDRATE IV INFUSION ADD-ON: CPT

## 2024-04-15 RX ORDER — SODIUM CHLORIDE 1 G/1
1 TABLET ORAL 2 TIMES DAILY
Status: ON HOLD | COMMUNITY
End: 2024-04-23

## 2024-04-15 RX ORDER — LANOLIN ALCOHOL/MO/W.PET/CERES
100 CREAM (GRAM) TOPICAL DAILY
Status: DISCONTINUED | OUTPATIENT
Start: 2024-04-15 | End: 2024-04-23 | Stop reason: HOSPADM

## 2024-04-15 RX ORDER — ONDANSETRON 2 MG/ML
4 INJECTION INTRAMUSCULAR; INTRAVENOUS EVERY 6 HOURS PRN
Status: DISCONTINUED | OUTPATIENT
Start: 2024-04-15 | End: 2024-04-23 | Stop reason: HOSPADM

## 2024-04-15 RX ORDER — UMECLIDINIUM BROMIDE AND VILANTEROL TRIFENATATE 62.5; 25 UG/1; UG/1
1 POWDER RESPIRATORY (INHALATION) DAILY
COMMUNITY

## 2024-04-15 RX ORDER — 0.9 % SODIUM CHLORIDE 0.9 %
1000 INTRAVENOUS SOLUTION INTRAVENOUS ONCE
Status: COMPLETED | OUTPATIENT
Start: 2024-04-15 | End: 2024-04-15

## 2024-04-15 RX ORDER — POLYETHYLENE GLYCOL 3350 17 G/17G
17 POWDER, FOR SOLUTION ORAL DAILY PRN
Status: DISCONTINUED | OUTPATIENT
Start: 2024-04-15 | End: 2024-04-23 | Stop reason: HOSPADM

## 2024-04-15 RX ORDER — SODIUM CHLORIDE 0.9 % (FLUSH) 0.9 %
5-40 SYRINGE (ML) INJECTION EVERY 12 HOURS SCHEDULED
Status: DISCONTINUED | OUTPATIENT
Start: 2024-04-15 | End: 2024-04-23

## 2024-04-15 RX ORDER — SODIUM CHLORIDE 0.9 % (FLUSH) 0.9 %
5-40 SYRINGE (ML) INJECTION PRN
Status: DISCONTINUED | OUTPATIENT
Start: 2024-04-15 | End: 2024-04-23 | Stop reason: HOSPADM

## 2024-04-15 RX ORDER — KETOROLAC TROMETHAMINE 30 MG/ML
15 INJECTION, SOLUTION INTRAMUSCULAR; INTRAVENOUS
Status: COMPLETED | OUTPATIENT
Start: 2024-04-15 | End: 2024-04-15

## 2024-04-15 RX ORDER — IPRATROPIUM BROMIDE AND ALBUTEROL SULFATE 2.5; .5 MG/3ML; MG/3ML
1 SOLUTION RESPIRATORY (INHALATION) EVERY 6 HOURS PRN
COMMUNITY

## 2024-04-15 RX ORDER — HYDROXYZINE HYDROCHLORIDE 25 MG/1
25 TABLET, FILM COATED ORAL EVERY 8 HOURS PRN
Status: DISCONTINUED | OUTPATIENT
Start: 2024-04-15 | End: 2024-04-23 | Stop reason: HOSPADM

## 2024-04-15 RX ORDER — FOLIC ACID 1 MG/1
1 TABLET ORAL DAILY
Status: DISCONTINUED | OUTPATIENT
Start: 2024-04-16 | End: 2024-04-23 | Stop reason: HOSPADM

## 2024-04-15 RX ORDER — NICOTINE 21 MG/24HR
1 PATCH, TRANSDERMAL 24 HOURS TRANSDERMAL DAILY
Status: DISCONTINUED | OUTPATIENT
Start: 2024-04-15 | End: 2024-04-23 | Stop reason: HOSPADM

## 2024-04-15 RX ORDER — ENOXAPARIN SODIUM 100 MG/ML
40 INJECTION SUBCUTANEOUS DAILY
Status: DISCONTINUED | OUTPATIENT
Start: 2024-04-15 | End: 2024-04-23 | Stop reason: HOSPADM

## 2024-04-15 RX ORDER — DIPHENHYDRAMINE HCL 25 MG
25 TABLET ORAL EVERY 6 HOURS PRN
COMMUNITY

## 2024-04-15 RX ORDER — KETOROLAC TROMETHAMINE 30 MG/ML
15 INJECTION, SOLUTION INTRAMUSCULAR; INTRAVENOUS ONCE
Status: COMPLETED | OUTPATIENT
Start: 2024-04-15 | End: 2024-04-15

## 2024-04-15 RX ORDER — HYDROXYZINE HYDROCHLORIDE 25 MG/1
25 TABLET, FILM COATED ORAL EVERY 8 HOURS PRN
COMMUNITY

## 2024-04-15 RX ORDER — GUAIFENESIN 600 MG/1
600 TABLET, EXTENDED RELEASE ORAL 2 TIMES DAILY
Status: DISCONTINUED | OUTPATIENT
Start: 2024-04-15 | End: 2024-04-23 | Stop reason: HOSPADM

## 2024-04-15 RX ORDER — AZITHROMYCIN 250 MG/1
500 TABLET, FILM COATED ORAL DAILY
Status: COMPLETED | OUTPATIENT
Start: 2024-04-15 | End: 2024-04-17

## 2024-04-15 RX ORDER — ACETAMINOPHEN 325 MG/1
650 TABLET ORAL EVERY 6 HOURS PRN
Status: DISCONTINUED | OUTPATIENT
Start: 2024-04-15 | End: 2024-04-23 | Stop reason: HOSPADM

## 2024-04-15 RX ORDER — SODIUM CHLORIDE 1 G/1
1 TABLET ORAL 2 TIMES DAILY
Status: DISCONTINUED | OUTPATIENT
Start: 2024-04-15 | End: 2024-04-16

## 2024-04-15 RX ORDER — ACETAMINOPHEN 500 MG
1000 TABLET ORAL
Status: COMPLETED | OUTPATIENT
Start: 2024-04-15 | End: 2024-04-15

## 2024-04-15 RX ORDER — SODIUM CHLORIDE 9 MG/ML
INJECTION, SOLUTION INTRAVENOUS PRN
Status: DISCONTINUED | OUTPATIENT
Start: 2024-04-15 | End: 2024-04-23 | Stop reason: HOSPADM

## 2024-04-15 RX ORDER — ONDANSETRON 4 MG/1
4 TABLET, ORALLY DISINTEGRATING ORAL EVERY 8 HOURS PRN
Status: DISCONTINUED | OUTPATIENT
Start: 2024-04-15 | End: 2024-04-23 | Stop reason: HOSPADM

## 2024-04-15 RX ORDER — ACETAMINOPHEN 650 MG/1
650 SUPPOSITORY RECTAL EVERY 6 HOURS PRN
Status: DISCONTINUED | OUTPATIENT
Start: 2024-04-15 | End: 2024-04-23 | Stop reason: HOSPADM

## 2024-04-15 RX ORDER — PREDNISONE 20 MG/1
40 TABLET ORAL DAILY
Status: COMPLETED | OUTPATIENT
Start: 2024-04-15 | End: 2024-04-19

## 2024-04-15 RX ORDER — SODIUM CHLORIDE 9 MG/ML
INJECTION, SOLUTION INTRAVENOUS CONTINUOUS
Status: DISCONTINUED | OUTPATIENT
Start: 2024-04-15 | End: 2024-04-16

## 2024-04-15 RX ADMIN — METOPROLOL 25 MG: 25 TABLET ORAL at 09:37

## 2024-04-15 RX ADMIN — SODIUM CHLORIDE 1 G: 1 TABLET ORAL at 09:38

## 2024-04-15 RX ADMIN — GUAIFENESIN 600 MG: 600 TABLET, EXTENDED RELEASE ORAL at 22:04

## 2024-04-15 RX ADMIN — PREDNISONE 50 MG: 20 TABLET ORAL at 04:21

## 2024-04-15 RX ADMIN — Medication 5 ML: at 22:04

## 2024-04-15 RX ADMIN — ENOXAPARIN SODIUM 40 MG: 100 INJECTION SUBCUTANEOUS at 09:38

## 2024-04-15 RX ADMIN — AZITHROMYCIN DIHYDRATE 500 MG: 250 TABLET ORAL at 09:38

## 2024-04-15 RX ADMIN — ACETAMINOPHEN 650 MG: 325 TABLET ORAL at 13:41

## 2024-04-15 RX ADMIN — PREDNISONE 40 MG: 20 TABLET ORAL at 09:37

## 2024-04-15 RX ADMIN — SERTRALINE HYDROCHLORIDE 50 MG: 50 TABLET ORAL at 13:42

## 2024-04-15 RX ADMIN — ACETAMINOPHEN 650 MG: 325 TABLET ORAL at 22:05

## 2024-04-15 RX ADMIN — GUAIFENESIN 600 MG: 600 TABLET, EXTENDED RELEASE ORAL at 09:38

## 2024-04-15 RX ADMIN — Medication 10 ML: at 13:45

## 2024-04-15 RX ADMIN — ALBUTEROL SULFATE 1 DOSE: 2.5 SOLUTION RESPIRATORY (INHALATION) at 04:29

## 2024-04-15 RX ADMIN — ACETAMINOPHEN 1000 MG: 500 TABLET ORAL at 05:29

## 2024-04-15 RX ADMIN — SODIUM CHLORIDE: 9 INJECTION, SOLUTION INTRAVENOUS at 13:44

## 2024-04-15 RX ADMIN — SODIUM CHLORIDE 1 G: 1 TABLET ORAL at 22:05

## 2024-04-15 RX ADMIN — KETOROLAC TROMETHAMINE 15 MG: 30 INJECTION, SOLUTION INTRAMUSCULAR; INTRAVENOUS at 09:25

## 2024-04-15 RX ADMIN — IPRATROPIUM BROMIDE AND ALBUTEROL SULFATE 1 DOSE: 2.5; .5 SOLUTION RESPIRATORY (INHALATION) at 10:16

## 2024-04-15 RX ADMIN — SODIUM CHLORIDE 1000 ML: 9 INJECTION, SOLUTION INTRAVENOUS at 06:48

## 2024-04-15 RX ADMIN — METOPROLOL 25 MG: 25 TABLET ORAL at 22:04

## 2024-04-15 RX ADMIN — IPRATROPIUM BROMIDE AND ALBUTEROL SULFATE 1 DOSE: 2.5; .5 SOLUTION RESPIRATORY (INHALATION) at 15:19

## 2024-04-15 RX ADMIN — IPRATROPIUM BROMIDE AND ALBUTEROL SULFATE 1 DOSE: 2.5; .5 SOLUTION RESPIRATORY (INHALATION) at 04:40

## 2024-04-15 RX ADMIN — Medication 100 MG: at 09:37

## 2024-04-15 RX ADMIN — KETOROLAC TROMETHAMINE 15 MG: 30 INJECTION INTRAMUSCULAR; INTRAVENOUS at 05:56

## 2024-04-15 ASSESSMENT — PAIN - FUNCTIONAL ASSESSMENT
PAIN_FUNCTIONAL_ASSESSMENT: ACTIVITIES ARE NOT PREVENTED

## 2024-04-15 ASSESSMENT — PAIN DESCRIPTION - PAIN TYPE: TYPE: CHRONIC PAIN

## 2024-04-15 ASSESSMENT — PAIN DESCRIPTION - DESCRIPTORS
DESCRIPTORS: THROBBING
DESCRIPTORS: THROBBING
DESCRIPTORS: POUNDING;PRESSURE
DESCRIPTORS: ACHING;THROBBING

## 2024-04-15 ASSESSMENT — PAIN SCALES - GENERAL
PAINLEVEL_OUTOF10: 7
PAINLEVEL_OUTOF10: 6
PAINLEVEL_OUTOF10: 10
PAINLEVEL_OUTOF10: 7
PAINLEVEL_OUTOF10: 9
PAINLEVEL_OUTOF10: 9
PAINLEVEL_OUTOF10: 3

## 2024-04-15 ASSESSMENT — PAIN DESCRIPTION - ORIENTATION
ORIENTATION: MID

## 2024-04-15 ASSESSMENT — PAIN DESCRIPTION - LOCATION
LOCATION: HEAD
LOCATION: BACK
LOCATION: CHEST
LOCATION: BACK

## 2024-04-15 ASSESSMENT — PAIN DESCRIPTION - FREQUENCY: FREQUENCY: CONTINUOUS

## 2024-04-15 ASSESSMENT — PAIN DESCRIPTION - ONSET: ONSET: ON-GOING

## 2024-04-15 NOTE — ED TRIAGE NOTES
Pt from home complaining of SOB that started at 10pm. She administered 2 rounds of valdo-neb with no relive. Hx COPD on 3L baseline. Endorse drinking 1 bottle beer.

## 2024-04-15 NOTE — ED PROVIDER NOTES
rhythm. Tachycardia present.   Pulmonary:      Effort: No respiratory distress.      Breath sounds: Decreased breath sounds and wheezing present.   Musculoskeletal:      Right lower leg: No edema.      Left lower leg: No edema.   Skin:     General: Skin is dry.      Coloration: Skin is pale.   Neurological:      General: No focal deficit present.      Mental Status: She is oriented to person, place, and time.   Psychiatric:         Mood and Affect: Mood normal.             EMERGENCY DEPARTMENT COURSE and DIFFERENTIAL DIAGNOSIS/MDM:   Vitals:    Vitals:    04/15/24 0432 04/15/24 0502 04/15/24 0532 04/15/24 0602   BP: 139/76 109/73 113/65 138/84   Pulse: 98 94 (!) 101 (!) 103   Resp: 19 15 19 18   Temp:       TempSrc:       SpO2: 98% 95% 100% 95%   Weight:             Medical Decision Making  Patient treated for acute COPD exacerbation provoked by smoking with DuoNebs and steroids.  She was incidentally found to have hyponatremia on her workup.  She will be given IV fluids.  Clinically she wants to be admitted for her symptoms, she is orally and has not improved enough after breathing treatments.      Amount and/or Complexity of Data Reviewed  Labs: ordered. Decision-making details documented in ED Course.  Radiology: ordered.  ECG/medicine tests: ordered.    Risk  OTC drugs.  Prescription drug management.            REASSESSMENT     ED Course as of 04/15/24 0710   Mon Apr 15, 2024   0420 IMPRESSION:     No acute process on portable chest.   [BN]   0638 Sodium(!!): 119 [BN]   0709 I have consulted the admitting hospitalist team to admit the patient for her symptoms. [BN]      ED Course User Index  [BN] Raheem Allen MD         CONSULTS:  None    PROCEDURES:     Procedures        (Please note that portions of this note were completed with a voice recognition program.  Efforts were made to edit the dictations but occasionally words are mis-transcribed.)    Raheem Allen MD (electronically signed)  Emergency

## 2024-04-15 NOTE — ED NOTES
Pt sodium 119 per labs @0330. Kaylee Stewart NP made aware. Order was put in for sodium chloride tablets & was given at 0981.

## 2024-04-15 NOTE — ED NOTES
Care handoff and bedside shift change report given to Lauren (oncoming nurse) by Gini (offgoing nurse). Report included the following information ED Encounter Summary and ED SBAR.

## 2024-04-15 NOTE — H&P
sats 96% on 2 L nasal cannula  General:  female, appears older than stated age, lying on the ED stretcher in no acute distress.  Fairly cooperative with assessment.  HEENT: EOMI, moist mucus membranes  Neck: Trachea midline  Chest: Coarse breath sounds with expiratory wheezes.  Sats 96% on 2 L nasal cannula.  + Productive cough.  CVS: RRR, sinus tach on telemetry, heart rate 111.  No murmur noted.  Abd: Soft, non-tender, non-distended, +bowel sounds   : Pure wick device in place with hazy yellow urine  Ext: No clubbing, no cyanosis, no edema  Neuro/Psych: Flat affect, sensory grossly within normal limit, Strength 5/5 in all extremities.  Alert and oriented to person, place, time and situation  Pulses: 2+, symmetric in all extremities  Skin: Warm, dry, without rashes or lesions    Data Review:   I have independently reviewed and interpreted patient's lab and all other diagnostic data    Abnormal Labs Reviewed   BRAIN NATRIURETIC PEPTIDE - Abnormal; Notable for the following components:       Result Value    NT Pro- (*)     All other components within normal limits   CBC WITH AUTO DIFFERENTIAL - Abnormal; Notable for the following components:    RBC 3.61 (*)     Hemoglobin 11.1 (*)     Hematocrit 32.2 (*)     All other components within normal limits   COMPREHENSIVE METABOLIC PANEL - Abnormal; Notable for the following components:    Sodium 119 (*)     Chloride 79 (*)     CO2 33 (*)     Glucose 112 (*)     Creatinine 0.38 (*)     Bun/Cre Ratio 21 (*)     All other components within normal limits     IMAGING:   XR CHEST PORTABLE   Final Result      No acute process on portable chest.            Notes reviewed from all clinical/nonclinical/nursing services involved in patient's clinical care. Care coordination discussions were held with appropriate clinical/nonclinical/ nursing providers based on care coordination needs.     Assessment/Plan:     COPD Exacerbation  Chronic respiratory failure  - No

## 2024-04-15 NOTE — ED NOTES
ED TO INPATIENT SBAR HANDOFF    Patient Name: Ronald Coffey   :  1965  58 y.o.   MRN:  [id number]  ED Room #:  ER11/11  Family/Caregiver Present no       Situation  Code Status: Full Code     Allergies: Mixed ragweed  Weight: Patient Vitals for the past 96 hrs (Last 3 readings):   Weight   04/15/24 0347 51 kg (112 lb 7 oz)     Arrived from: home  Chief Complaint:   Chief Complaint   Patient presents with    Shortness of Breath     Hospital Problem/Diagnosis:  Principal Problem:    COPD exacerbation (HCC)  Resolved Problems:    * No resolved hospital problems. *    Imaging:   XR CHEST PORTABLE   Final Result      No acute process on portable chest.           Abnormal labs:   Abnormal Labs Reviewed   BRAIN NATRIURETIC PEPTIDE - Abnormal; Notable for the following components:       Result Value    NT Pro- (*)     All other components within normal limits   CBC WITH AUTO DIFFERENTIAL - Abnormal; Notable for the following components:    RBC 3.61 (*)     Hemoglobin 11.1 (*)     Hematocrit 32.2 (*)     All other components within normal limits   COMPREHENSIVE METABOLIC PANEL - Abnormal; Notable for the following components:    Sodium 119 (*)     Chloride 79 (*)     CO2 33 (*)     Glucose 112 (*)     Creatinine 0.38 (*)     Bun/Cre Ratio 21 (*)     All other components within normal limits     Abnormal Assessment Findings: COPD exacerbation     SAFETY    Mobility: limited bed mobility   ED Fall Risk: No data recorded   Restraints no   Sitter no     Background  History:   Past Medical History:   Diagnosis Date    COPD (chronic obstructive pulmonary disease) (HCC)     HTN (hypertension)     Smokes 1 to 4 cigarettes per day        Assessment    Vitals/MEWS:        Vitals:    04/15/24 1430 04/15/24 1445 04/15/24 1500 04/15/24 1530   BP: (!) 149/86 (!) 149/86 (!) 151/84 (!) 143/78   Pulse: 88 88 89 87   Resp: 21 21 23 (!) 9   Temp:       TempSrc:       SpO2:   97% 100%   Weight:         DI:   Predictive Model

## 2024-04-16 LAB
ANION GAP SERPL CALC-SCNC: 5 MMOL/L (ref 5–15)
BASOPHILS # BLD: 0 K/UL (ref 0–0.1)
BASOPHILS NFR BLD: 0 % (ref 0–1)
BUN SERPL-MCNC: 7 MG/DL (ref 6–20)
BUN/CREAT SERPL: 15 (ref 12–20)
CALCIUM SERPL-MCNC: 8.7 MG/DL (ref 8.5–10.1)
CHLORIDE SERPL-SCNC: 79 MMOL/L (ref 97–108)
CO2 SERPL-SCNC: 33 MMOL/L (ref 21–32)
CREAT SERPL-MCNC: 0.46 MG/DL (ref 0.55–1.02)
DIFFERENTIAL METHOD BLD: ABNORMAL
EOSINOPHIL # BLD: 0 K/UL (ref 0–0.4)
EOSINOPHIL NFR BLD: 0 % (ref 0–7)
ERYTHROCYTE [DISTWIDTH] IN BLOOD BY AUTOMATED COUNT: 12 % (ref 11.5–14.5)
GLUCOSE SERPL-MCNC: 117 MG/DL (ref 65–100)
HCT VFR BLD AUTO: 30 % (ref 35–47)
HGB BLD-MCNC: 10.5 G/DL (ref 11.5–16)
IMM GRANULOCYTES # BLD AUTO: 0 K/UL (ref 0–0.04)
IMM GRANULOCYTES NFR BLD AUTO: 0 % (ref 0–0.5)
LYMPHOCYTES # BLD: 0.6 K/UL (ref 0.8–3.5)
LYMPHOCYTES NFR BLD: 8 % (ref 12–49)
MCH RBC QN AUTO: 31 PG (ref 26–34)
MCHC RBC AUTO-ENTMCNC: 35 G/DL (ref 30–36.5)
MCV RBC AUTO: 88.5 FL (ref 80–99)
MONOCYTES # BLD: 0.3 K/UL (ref 0–1)
MONOCYTES NFR BLD: 4 % (ref 5–13)
NEUTS SEG # BLD: 7.2 K/UL (ref 1.8–8)
NEUTS SEG NFR BLD: 88 % (ref 32–75)
NRBC # BLD: 0 K/UL (ref 0–0.01)
NRBC BLD-RTO: 0 PER 100 WBC
OSMOLALITY SERPL: 246 MOSM/KG H2O
OSMOLALITY UR: 521 MOSM/KG H2O
PLATELET # BLD AUTO: 256 K/UL (ref 150–400)
PMV BLD AUTO: 9.7 FL (ref 8.9–12.9)
POTASSIUM SERPL-SCNC: 4 MMOL/L (ref 3.5–5.1)
RBC # BLD AUTO: 3.39 M/UL (ref 3.8–5.2)
RBC MORPH BLD: ABNORMAL
SODIUM SERPL-SCNC: 117 MMOL/L (ref 136–145)
SODIUM UR-SCNC: 92 MMOL/L
WBC # BLD AUTO: 8.1 K/UL (ref 3.6–11)

## 2024-04-16 PROCEDURE — 2580000003 HC RX 258: Performed by: NURSE PRACTITIONER

## 2024-04-16 PROCEDURE — 6370000000 HC RX 637 (ALT 250 FOR IP): Performed by: HOSPITALIST

## 2024-04-16 PROCEDURE — 2700000000 HC OXYGEN THERAPY PER DAY

## 2024-04-16 PROCEDURE — 6370000000 HC RX 637 (ALT 250 FOR IP): Performed by: NURSE PRACTITIONER

## 2024-04-16 PROCEDURE — 2060000000 HC ICU INTERMEDIATE R&B

## 2024-04-16 PROCEDURE — 6360000002 HC RX W HCPCS: Performed by: NURSE PRACTITIONER

## 2024-04-16 PROCEDURE — 94640 AIRWAY INHALATION TREATMENT: CPT

## 2024-04-16 PROCEDURE — 83935 ASSAY OF URINE OSMOLALITY: CPT

## 2024-04-16 PROCEDURE — 80048 BASIC METABOLIC PNL TOTAL CA: CPT

## 2024-04-16 PROCEDURE — 84300 ASSAY OF URINE SODIUM: CPT

## 2024-04-16 PROCEDURE — 94010 BREATHING CAPACITY TEST: CPT

## 2024-04-16 PROCEDURE — 85025 COMPLETE CBC W/AUTO DIFF WBC: CPT

## 2024-04-16 PROCEDURE — 36415 COLL VENOUS BLD VENIPUNCTURE: CPT

## 2024-04-16 PROCEDURE — 83930 ASSAY OF BLOOD OSMOLALITY: CPT

## 2024-04-16 PROCEDURE — 94760 N-INVAS EAR/PLS OXIMETRY 1: CPT

## 2024-04-16 RX ORDER — 3% SODIUM CHLORIDE 3 G/100ML
20 INJECTION, SOLUTION INTRAVENOUS CONTINUOUS
Status: DISPENSED | OUTPATIENT
Start: 2024-04-16 | End: 2024-04-16

## 2024-04-16 RX ORDER — METOPROLOL TARTRATE 50 MG/1
50 TABLET, FILM COATED ORAL 2 TIMES DAILY
Status: CANCELLED | OUTPATIENT
Start: 2024-04-16

## 2024-04-16 RX ORDER — 3% SODIUM CHLORIDE 3 G/100ML
20 INJECTION, SOLUTION INTRAVENOUS CONTINUOUS
Status: ACTIVE | OUTPATIENT
Start: 2024-04-17 | End: 2024-04-17

## 2024-04-16 RX ORDER — 3% SODIUM CHLORIDE 3 G/100ML
20 INJECTION, SOLUTION INTRAVENOUS CONTINUOUS
Status: CANCELLED | OUTPATIENT
Start: 2024-04-16 | End: 2024-04-16

## 2024-04-16 RX ORDER — METOPROLOL TARTRATE 50 MG/1
50 TABLET, FILM COATED ORAL 2 TIMES DAILY
Status: DISCONTINUED | OUTPATIENT
Start: 2024-04-16 | End: 2024-04-23 | Stop reason: HOSPADM

## 2024-04-16 RX ORDER — LORAZEPAM 0.5 MG/1
0.5 TABLET ORAL
Status: COMPLETED | OUTPATIENT
Start: 2024-04-16 | End: 2024-04-16

## 2024-04-16 RX ADMIN — LORAZEPAM 0.5 MG: 0.5 TABLET ORAL at 08:48

## 2024-04-16 RX ADMIN — Medication 1 MG: at 08:49

## 2024-04-16 RX ADMIN — PREDNISONE 40 MG: 20 TABLET ORAL at 08:48

## 2024-04-16 RX ADMIN — IPRATROPIUM BROMIDE AND ALBUTEROL SULFATE 1 DOSE: 2.5; .5 SOLUTION RESPIRATORY (INHALATION) at 07:14

## 2024-04-16 RX ADMIN — IPRATROPIUM BROMIDE AND ALBUTEROL SULFATE 1 DOSE: 2.5; .5 SOLUTION RESPIRATORY (INHALATION) at 16:49

## 2024-04-16 RX ADMIN — GUAIFENESIN 600 MG: 600 TABLET, EXTENDED RELEASE ORAL at 21:22

## 2024-04-16 RX ADMIN — IPRATROPIUM BROMIDE AND ALBUTEROL SULFATE 1 DOSE: 2.5; .5 SOLUTION RESPIRATORY (INHALATION) at 13:01

## 2024-04-16 RX ADMIN — Medication 100 MG: at 08:48

## 2024-04-16 RX ADMIN — ENOXAPARIN SODIUM 40 MG: 100 INJECTION SUBCUTANEOUS at 08:49

## 2024-04-16 RX ADMIN — GUAIFENESIN 600 MG: 600 TABLET, EXTENDED RELEASE ORAL at 08:49

## 2024-04-16 RX ADMIN — METOPROLOL 25 MG: 25 TABLET ORAL at 08:48

## 2024-04-16 RX ADMIN — METOPROLOL TARTRATE 50 MG: 50 TABLET, FILM COATED ORAL at 21:22

## 2024-04-16 RX ADMIN — SERTRALINE HYDROCHLORIDE 50 MG: 50 TABLET ORAL at 08:48

## 2024-04-16 RX ADMIN — AZITHROMYCIN DIHYDRATE 500 MG: 250 TABLET ORAL at 08:48

## 2024-04-16 RX ADMIN — IPRATROPIUM BROMIDE AND ALBUTEROL SULFATE 1 DOSE: 2.5; .5 SOLUTION RESPIRATORY (INHALATION) at 20:34

## 2024-04-16 RX ADMIN — Medication 10 ML: at 21:22

## 2024-04-16 RX ADMIN — SODIUM CHLORIDE 1 G: 1 TABLET ORAL at 08:48

## 2024-04-16 RX ADMIN — ACETAMINOPHEN 650 MG: 325 TABLET ORAL at 21:29

## 2024-04-16 RX ADMIN — 3% SODIUM CHLORIDE 20 ML/HR: 3 INJECTION, SOLUTION INTRAVENOUS at 23:38

## 2024-04-16 ASSESSMENT — COPD QUESTIONNAIRES
QUESTION5_HOMEACTIVITIES: 3
QUESTION3_CHESTTIGHTNESS: 2
QUESTION2_CHESTPHLEGM: 2
QUESTION1_COUGHFREQUENCY: 3
CAT_TOTALSCORE: 26
QUESTION7_SLEEPQUALITY: 5
QUESTION6_LEAVINGHOUSE: 3
QUESTION4_WALKINCLINE: 5
QUESTION8_ENERGYLEVEL: 3

## 2024-04-16 ASSESSMENT — PAIN DESCRIPTION - LOCATION: LOCATION: HEAD

## 2024-04-16 ASSESSMENT — PAIN SCALES - GENERAL: PAINLEVEL_OUTOF10: 6

## 2024-04-16 NOTE — CONSULTS
[facility]    NEPHROLOGY CONSULT NOTE     Patient: Ronald Coffey MRN: [id number]  PCP: No primary care provider on file.   :     1965  Age:   58 y.o.  Sex:  female      Referring physician: Douglas Zuleta MD  Reason for consultation: 58 y.o. female with Hyponatremia [E87.1]  Tobacco abuse [Z72.0]  COPD exacerbation (HCC) [J44.1] complicated by hyponatremia  Admission Date: 4/15/2024  3:36 AM  LOS: 1 day      ASSESSMENT and PLAN :   Acute on chronic Hypovolemia Hyponatremia:   -Baseline Na range from 128-130  - Na on presentation 119  - No labs since yesterday morning   -Currently on NaCl infusion at 50ml/hr  -On NaCl 1gm tab BID  - Robin Kelley uK, order stat labs now  - Na correction no more than 6meq next 24 hours.   - strict I&O  - Na Q6 hrs       HTN:  BP is elevated systolic in 150s -160s  Home Metoprolol increased from 25mg bid to 50mg bid   Will re-assess tomorrow, if need we can add ARBs or CCB    ETOH abuse:  On thiamine and folic acid      COPD exacerbation:  On azithromycin, Mucinex, prednisone, Inhaler   Primary team managing it     Chronic Anemia:  Iron store- B12, folate  No need for ALETHA         Tobacco use:      Thank you for the consult, Nephrology will continue to follow.       Subjective:   HPI: Ronald Coffey is a 58 y.o.  female who has been admitted to the hospital for COPD exacerbation. The patient's PMH is significant for HTN, COPD, hyponatremia, alcohol abuse. She presented to the hospital with increased SOB, 3L NC at baseline, upon further work-up reveal Na of 119, Pt reports drinking about 10oz of beer per day, her PO intake is poor and have been feeling nauseated lately, her baseline Na range from 118-132.       Nephrology was consulted for the evaluation and management of hyponatremia.    Past Medical Hx:   Past Medical History:   Diagnosis Date    COPD (chronic obstructive pulmonary disease) (HCC)     HTN (hypertension)     Smokes 1 to 4

## 2024-04-17 LAB
ANION GAP SERPL CALC-SCNC: 4 MMOL/L (ref 5–15)
ANION GAP SERPL CALC-SCNC: 4 MMOL/L (ref 5–15)
ANION GAP SERPL CALC-SCNC: 5 MMOL/L (ref 5–15)
BUN SERPL-MCNC: 16 MG/DL (ref 6–20)
BUN SERPL-MCNC: 6 MG/DL (ref 6–20)
BUN SERPL-MCNC: 9 MG/DL (ref 6–20)
BUN/CREAT SERPL: 13 (ref 12–20)
BUN/CREAT SERPL: 23 (ref 12–20)
BUN/CREAT SERPL: 25 (ref 12–20)
CALCIUM SERPL-MCNC: 8.4 MG/DL (ref 8.5–10.1)
CALCIUM SERPL-MCNC: 8.5 MG/DL (ref 8.5–10.1)
CALCIUM SERPL-MCNC: 8.9 MG/DL (ref 8.5–10.1)
CHLORIDE SERPL-SCNC: 82 MMOL/L (ref 97–108)
CHLORIDE SERPL-SCNC: 83 MMOL/L (ref 97–108)
CHLORIDE SERPL-SCNC: 84 MMOL/L (ref 97–108)
CO2 SERPL-SCNC: 32 MMOL/L (ref 21–32)
CO2 SERPL-SCNC: 33 MMOL/L (ref 21–32)
CO2 SERPL-SCNC: 33 MMOL/L (ref 21–32)
CREAT SERPL-MCNC: 0.4 MG/DL (ref 0.55–1.02)
CREAT SERPL-MCNC: 0.48 MG/DL (ref 0.55–1.02)
CREAT SERPL-MCNC: 0.65 MG/DL (ref 0.55–1.02)
ERYTHROCYTE [DISTWIDTH] IN BLOOD BY AUTOMATED COUNT: 12.4 % (ref 11.5–14.5)
FERRITIN SERPL-MCNC: 92 NG/ML (ref 8–252)
GLUCOSE SERPL-MCNC: 123 MG/DL (ref 65–100)
GLUCOSE SERPL-MCNC: 175 MG/DL (ref 65–100)
GLUCOSE SERPL-MCNC: 96 MG/DL (ref 65–100)
HCT VFR BLD AUTO: 28.2 % (ref 35–47)
HGB BLD-MCNC: 9.9 G/DL (ref 11.5–16)
IRON SATN MFR SERPL: 15 % (ref 20–50)
IRON SERPL-MCNC: 55 UG/DL (ref 35–150)
MCH RBC QN AUTO: 31.1 PG (ref 26–34)
MCHC RBC AUTO-ENTMCNC: 35.1 G/DL (ref 30–36.5)
MCV RBC AUTO: 88.7 FL (ref 80–99)
NRBC # BLD: 0 K/UL (ref 0–0.01)
NRBC BLD-RTO: 0 PER 100 WBC
PLATELET # BLD AUTO: 243 K/UL (ref 150–400)
PMV BLD AUTO: 9.8 FL (ref 8.9–12.9)
POTASSIUM SERPL-SCNC: 3.4 MMOL/L (ref 3.5–5.1)
POTASSIUM SERPL-SCNC: 3.6 MMOL/L (ref 3.5–5.1)
POTASSIUM SERPL-SCNC: 3.7 MMOL/L (ref 3.5–5.1)
RBC # BLD AUTO: 3.18 M/UL (ref 3.8–5.2)
SODIUM SERPL-SCNC: 120 MMOL/L (ref 136–145)
TIBC SERPL-MCNC: 367 UG/DL (ref 250–450)
URATE SERPL-MCNC: 1.5 MG/DL (ref 2.6–6)
WBC # BLD AUTO: 7.8 K/UL (ref 3.6–11)

## 2024-04-17 PROCEDURE — 2580000003 HC RX 258: Performed by: NURSE PRACTITIONER

## 2024-04-17 PROCEDURE — 6370000000 HC RX 637 (ALT 250 FOR IP): Performed by: HOSPITALIST

## 2024-04-17 PROCEDURE — 6370000000 HC RX 637 (ALT 250 FOR IP): Performed by: NURSE PRACTITIONER

## 2024-04-17 PROCEDURE — 80048 BASIC METABOLIC PNL TOTAL CA: CPT

## 2024-04-17 PROCEDURE — 94640 AIRWAY INHALATION TREATMENT: CPT

## 2024-04-17 PROCEDURE — 83550 IRON BINDING TEST: CPT

## 2024-04-17 PROCEDURE — 6360000002 HC RX W HCPCS: Performed by: HOSPITALIST

## 2024-04-17 PROCEDURE — 83540 ASSAY OF IRON: CPT

## 2024-04-17 PROCEDURE — 6360000002 HC RX W HCPCS: Performed by: NURSE PRACTITIONER

## 2024-04-17 PROCEDURE — 84550 ASSAY OF BLOOD/URIC ACID: CPT

## 2024-04-17 PROCEDURE — 82728 ASSAY OF FERRITIN: CPT

## 2024-04-17 PROCEDURE — 2060000000 HC ICU INTERMEDIATE R&B

## 2024-04-17 PROCEDURE — 2700000000 HC OXYGEN THERAPY PER DAY

## 2024-04-17 PROCEDURE — 94760 N-INVAS EAR/PLS OXIMETRY 1: CPT

## 2024-04-17 PROCEDURE — 85027 COMPLETE CBC AUTOMATED: CPT

## 2024-04-17 PROCEDURE — 36415 COLL VENOUS BLD VENIPUNCTURE: CPT

## 2024-04-17 RX ORDER — 3% SODIUM CHLORIDE 3 G/100ML
30 INJECTION, SOLUTION INTRAVENOUS ONCE
Status: COMPLETED | OUTPATIENT
Start: 2024-04-17 | End: 2024-04-17

## 2024-04-17 RX ORDER — ARFORMOTEROL TARTRATE 15 UG/2ML
15 SOLUTION RESPIRATORY (INHALATION)
Status: DISCONTINUED | OUTPATIENT
Start: 2024-04-17 | End: 2024-04-23 | Stop reason: HOSPADM

## 2024-04-17 RX ORDER — 3% SODIUM CHLORIDE 3 G/100ML
30 INJECTION, SOLUTION INTRAVENOUS CONTINUOUS
Status: DISPENSED | OUTPATIENT
Start: 2024-04-17 | End: 2024-04-17

## 2024-04-17 RX ADMIN — Medication 100 MG: at 09:50

## 2024-04-17 RX ADMIN — METOPROLOL TARTRATE 50 MG: 50 TABLET, FILM COATED ORAL at 09:49

## 2024-04-17 RX ADMIN — GUAIFENESIN 600 MG: 600 TABLET, EXTENDED RELEASE ORAL at 20:44

## 2024-04-17 RX ADMIN — SODIUM CHLORIDE 30 ML/HR: 3 INJECTION, SOLUTION INTRAVENOUS at 15:36

## 2024-04-17 RX ADMIN — GUAIFENESIN 600 MG: 600 TABLET, EXTENDED RELEASE ORAL at 09:50

## 2024-04-17 RX ADMIN — IPRATROPIUM BROMIDE AND ALBUTEROL SULFATE 1 DOSE: 2.5; .5 SOLUTION RESPIRATORY (INHALATION) at 20:52

## 2024-04-17 RX ADMIN — ARFORMOTEROL TARTRATE 15 MCG: 15 SOLUTION RESPIRATORY (INHALATION) at 20:52

## 2024-04-17 RX ADMIN — IPRATROPIUM BROMIDE AND ALBUTEROL SULFATE 1 DOSE: 2.5; .5 SOLUTION RESPIRATORY (INHALATION) at 11:02

## 2024-04-17 RX ADMIN — AZITHROMYCIN DIHYDRATE 500 MG: 250 TABLET ORAL at 09:50

## 2024-04-17 RX ADMIN — IPRATROPIUM BROMIDE AND ALBUTEROL SULFATE 1 DOSE: 2.5; .5 SOLUTION RESPIRATORY (INHALATION) at 06:50

## 2024-04-17 RX ADMIN — Medication 10 ML: at 10:03

## 2024-04-17 RX ADMIN — Medication 10 ML: at 20:45

## 2024-04-17 RX ADMIN — ENOXAPARIN SODIUM 40 MG: 100 INJECTION SUBCUTANEOUS at 09:50

## 2024-04-17 RX ADMIN — HYDROXYZINE HYDROCHLORIDE 25 MG: 25 TABLET, FILM COATED ORAL at 06:49

## 2024-04-17 RX ADMIN — PREDNISONE 40 MG: 20 TABLET ORAL at 09:50

## 2024-04-17 RX ADMIN — SODIUM CHLORIDE 30 ML/HR: 3 INJECTION, SOLUTION INTRAVENOUS at 23:44

## 2024-04-17 RX ADMIN — METOPROLOL TARTRATE 50 MG: 50 TABLET, FILM COATED ORAL at 20:44

## 2024-04-17 RX ADMIN — IPRATROPIUM BROMIDE AND ALBUTEROL SULFATE 1 DOSE: 2.5; .5 SOLUTION RESPIRATORY (INHALATION) at 16:21

## 2024-04-17 RX ADMIN — HYDROXYZINE HYDROCHLORIDE 25 MG: 25 TABLET, FILM COATED ORAL at 19:47

## 2024-04-17 RX ADMIN — SERTRALINE HYDROCHLORIDE 50 MG: 50 TABLET ORAL at 09:50

## 2024-04-17 RX ADMIN — Medication 1 MG: at 09:50

## 2024-04-17 NOTE — DISCHARGE INSTRUCTIONS
Smoking Cessation Program: This is a free, phone/web based, smoking cessation program. The program is individualized to meet each patient's needs. To enroll use the link - www.[email or web address].org or call 9-637-JXPIVHI ([phone]) .               Discharge Instructions       PATIENT ID: Ronald Coffey  MRN: [id number]   YOB: 1965    DATE OF ADMISSION: 4/15/2024   DATE OF DISCHARGE: 4/23/2024    PRIMARY CARE PROVIDER: No primary care provider on file.     ATTENDING PHYSICIAN: Ashly Shelby MD   DISCHARGING PROVIDER: Ashly Shelby MD    To contact this individual call [phone] and ask the  to page.   If unavailable ask to be transferred the Adult Hospitalist Department.    DISCHARGE DIAGNOSES     COPD Exacerbation,resolved.  Chronic respiratory failure on home oxygen   - Take your medications as prescribed.We recommend you quit smoking cigarettes      Hyponatremia, acute on chronic,likely sec to SIADH .Kidney doctors recommended to discontinue the Zoloft which  is known to cause hyponatremia(SIADH),which you said you are prescribed for anxiety.You are now started on Buspar to help with  anxiety.  -Take prescribed salt tablets,limit your fluid intake to no more than 1200 ml  -We recommend you follow-up with your primary doctor for blood work to check your sodium and other electrolytes within a week of discharge        CONSULTATIONS: You were seen and followed by kidney specialist doctors    PROCEDURES/SURGERIES: * No surgery found *    PENDING TEST RESULTS:   At the time of discharge the following test results are still pending: None    FOLLOW UP APPOINTMENTS:   We recommend postdischarge follow-up within a week, you will need blood work to monitor your sodium.    ADDITIONAL CARE RECOMMENDATIONS:     DIET: regular diet    ACTIVITY: activity as tolerated      EQUIPMENT /DME: home oxygen       DISCHARGE MEDICATIONS:   See Medication Reconciliation Form    It is important that you take

## 2024-04-17 NOTE — PLAN OF CARE
Problem: Pain  Goal: Verbalizes/displays adequate comfort level or baseline comfort level  Outcome: Progressing     Problem: Skin/Tissue Integrity  Goal: Absence of new skin breakdown  Description: 1.  Monitor for areas of redness and/or skin breakdown  2.  Assess vascular access sites hourly  3.  Every 4-6 hours minimum:  Change oxygen saturation probe site  4.  Every 4-6 hours:  If on nasal continuous positive airway pressure, respiratory therapy assess nares and determine need for appliance change or resting period.  Outcome: Progressing     Problem: Safety - Adult  Goal: Free from fall injury  Outcome: Progressing  Flowsheets (Taken 4/17/2024 0000)  Free From Fall Injury: Instruct family/caregiver on patient safety     Problem: Discharge Planning  Goal: Discharge to home or other facility with appropriate resources  Outcome: Progressing  Flowsheets (Taken 4/17/2024 0000)  Discharge to home or other facility with appropriate resources: Identify barriers to discharge with patient and caregiver

## 2024-04-17 NOTE — CARDIO/PULMONARY
Cardiac Rehab: Per EMR, patient is a current smoker. Smoking Cessation Program information placed on the AVS.    BRIAN JORDAN RN  
ambulatory

## 2024-04-18 LAB
ALBUMIN SERPL-MCNC: 3.3 G/DL (ref 3.5–5)
ANION GAP SERPL CALC-SCNC: 5 MMOL/L (ref 5–15)
ANION GAP SERPL CALC-SCNC: 6 MMOL/L (ref 5–15)
BUN SERPL-MCNC: 10 MG/DL (ref 6–20)
BUN SERPL-MCNC: 8 MG/DL (ref 6–20)
BUN/CREAT SERPL: 17 (ref 12–20)
BUN/CREAT SERPL: 22 (ref 12–20)
CALCIUM SERPL-MCNC: 8 MG/DL (ref 8.5–10.1)
CALCIUM SERPL-MCNC: 8.9 MG/DL (ref 8.5–10.1)
CHLORIDE SERPL-SCNC: 85 MMOL/L (ref 97–108)
CHLORIDE SERPL-SCNC: 89 MMOL/L (ref 97–108)
CO2 SERPL-SCNC: 32 MMOL/L (ref 21–32)
CO2 SERPL-SCNC: 34 MMOL/L (ref 21–32)
CREAT SERPL-MCNC: 0.45 MG/DL (ref 0.55–1.02)
CREAT SERPL-MCNC: 0.47 MG/DL (ref 0.55–1.02)
ERYTHROCYTE [DISTWIDTH] IN BLOOD BY AUTOMATED COUNT: 12.4 % (ref 11.5–14.5)
GLUCOSE SERPL-MCNC: 108 MG/DL (ref 65–100)
GLUCOSE SERPL-MCNC: 121 MG/DL (ref 65–100)
HCT VFR BLD AUTO: 26.1 % (ref 35–47)
HGB BLD-MCNC: 9.2 G/DL (ref 11.5–16)
MCH RBC QN AUTO: 31.6 PG (ref 26–34)
MCHC RBC AUTO-ENTMCNC: 35.2 G/DL (ref 30–36.5)
MCV RBC AUTO: 89.7 FL (ref 80–99)
NRBC # BLD: 0 K/UL (ref 0–0.01)
NRBC BLD-RTO: 0 PER 100 WBC
PHOSPHATE SERPL-MCNC: 2.6 MG/DL (ref 2.6–4.7)
PLATELET # BLD AUTO: 231 K/UL (ref 150–400)
PMV BLD AUTO: 9.9 FL (ref 8.9–12.9)
POTASSIUM SERPL-SCNC: 3 MMOL/L (ref 3.5–5.1)
POTASSIUM SERPL-SCNC: 3.2 MMOL/L (ref 3.5–5.1)
RBC # BLD AUTO: 2.91 M/UL (ref 3.8–5.2)
SODIUM SERPL-SCNC: 124 MMOL/L (ref 136–145)
SODIUM SERPL-SCNC: 127 MMOL/L (ref 136–145)
WBC # BLD AUTO: 9.7 K/UL (ref 3.6–11)

## 2024-04-18 PROCEDURE — 6360000002 HC RX W HCPCS: Performed by: NURSE PRACTITIONER

## 2024-04-18 PROCEDURE — 6370000000 HC RX 637 (ALT 250 FOR IP): Performed by: HOSPITALIST

## 2024-04-18 PROCEDURE — 80069 RENAL FUNCTION PANEL: CPT

## 2024-04-18 PROCEDURE — 94640 AIRWAY INHALATION TREATMENT: CPT

## 2024-04-18 PROCEDURE — 36415 COLL VENOUS BLD VENIPUNCTURE: CPT

## 2024-04-18 PROCEDURE — 94760 N-INVAS EAR/PLS OXIMETRY 1: CPT

## 2024-04-18 PROCEDURE — 6370000000 HC RX 637 (ALT 250 FOR IP): Performed by: NURSE PRACTITIONER

## 2024-04-18 PROCEDURE — 2580000003 HC RX 258: Performed by: NURSE PRACTITIONER

## 2024-04-18 PROCEDURE — 2700000000 HC OXYGEN THERAPY PER DAY

## 2024-04-18 PROCEDURE — 2060000000 HC ICU INTERMEDIATE R&B

## 2024-04-18 PROCEDURE — 85027 COMPLETE CBC AUTOMATED: CPT

## 2024-04-18 PROCEDURE — 80048 BASIC METABOLIC PNL TOTAL CA: CPT

## 2024-04-18 PROCEDURE — 6360000002 HC RX W HCPCS: Performed by: HOSPITALIST

## 2024-04-18 RX ORDER — POTASSIUM CHLORIDE 750 MG/1
40 TABLET, FILM COATED, EXTENDED RELEASE ORAL 2 TIMES DAILY
Status: COMPLETED | OUTPATIENT
Start: 2024-04-18 | End: 2024-04-18

## 2024-04-18 RX ADMIN — METOPROLOL TARTRATE 50 MG: 50 TABLET, FILM COATED ORAL at 21:54

## 2024-04-18 RX ADMIN — GUAIFENESIN 600 MG: 600 TABLET, EXTENDED RELEASE ORAL at 10:04

## 2024-04-18 RX ADMIN — Medication 10 ML: at 10:08

## 2024-04-18 RX ADMIN — ACETAMINOPHEN 650 MG: 325 TABLET ORAL at 21:53

## 2024-04-18 RX ADMIN — Medication 100 MG: at 10:04

## 2024-04-18 RX ADMIN — HYDROXYZINE HYDROCHLORIDE 25 MG: 25 TABLET, FILM COATED ORAL at 21:53

## 2024-04-18 RX ADMIN — Medication 15 G: at 16:11

## 2024-04-18 RX ADMIN — IPRATROPIUM BROMIDE AND ALBUTEROL SULFATE 1 DOSE: 2.5; .5 SOLUTION RESPIRATORY (INHALATION) at 07:57

## 2024-04-18 RX ADMIN — Medication 10 ML: at 21:57

## 2024-04-18 RX ADMIN — Medication 1 MG: at 10:04

## 2024-04-18 RX ADMIN — PREDNISONE 40 MG: 20 TABLET ORAL at 10:04

## 2024-04-18 RX ADMIN — ENOXAPARIN SODIUM 40 MG: 100 INJECTION SUBCUTANEOUS at 10:04

## 2024-04-18 RX ADMIN — ARFORMOTEROL TARTRATE 15 MCG: 15 SOLUTION RESPIRATORY (INHALATION) at 21:06

## 2024-04-18 RX ADMIN — IPRATROPIUM BROMIDE AND ALBUTEROL SULFATE 1 DOSE: 2.5; .5 SOLUTION RESPIRATORY (INHALATION) at 21:06

## 2024-04-18 RX ADMIN — Medication 15 G: at 21:53

## 2024-04-18 RX ADMIN — SERTRALINE HYDROCHLORIDE 50 MG: 50 TABLET ORAL at 10:04

## 2024-04-18 RX ADMIN — IPRATROPIUM BROMIDE AND ALBUTEROL SULFATE 1 DOSE: 2.5; .5 SOLUTION RESPIRATORY (INHALATION) at 13:06

## 2024-04-18 RX ADMIN — ARFORMOTEROL TARTRATE 15 MCG: 15 SOLUTION RESPIRATORY (INHALATION) at 07:57

## 2024-04-18 RX ADMIN — METOPROLOL TARTRATE 50 MG: 50 TABLET, FILM COATED ORAL at 10:04

## 2024-04-18 RX ADMIN — HYDROXYZINE HYDROCHLORIDE 25 MG: 25 TABLET, FILM COATED ORAL at 03:13

## 2024-04-18 RX ADMIN — POTASSIUM CHLORIDE 40 MEQ: 750 TABLET, FILM COATED, EXTENDED RELEASE ORAL at 13:17

## 2024-04-18 RX ADMIN — POTASSIUM CHLORIDE 40 MEQ: 750 TABLET, FILM COATED, EXTENDED RELEASE ORAL at 21:55

## 2024-04-18 RX ADMIN — GUAIFENESIN 600 MG: 600 TABLET, EXTENDED RELEASE ORAL at 21:54

## 2024-04-18 ASSESSMENT — PAIN DESCRIPTION - ORIENTATION: ORIENTATION: OTHER (COMMENT)

## 2024-04-18 ASSESSMENT — PAIN SCALES - GENERAL
PAINLEVEL_OUTOF10: 6
PAINLEVEL_OUTOF10: 7
PAINLEVEL_OUTOF10: 5

## 2024-04-18 ASSESSMENT — PAIN DESCRIPTION - DESCRIPTORS
DESCRIPTORS: ACHING
DESCRIPTORS: ACHING

## 2024-04-18 ASSESSMENT — PAIN DESCRIPTION - LOCATION
LOCATION: OTHER (COMMENT)
LOCATION: OTHER (COMMENT);HEAD

## 2024-04-18 ASSESSMENT — PAIN DESCRIPTION - PAIN TYPE: TYPE: ACUTE PAIN

## 2024-04-18 NOTE — PLAN OF CARE
Problem: Pain  Goal: Verbalizes/displays adequate comfort level or baseline comfort level  Outcome: Progressing  Flowsheets (Taken 4/17/2024 2030)  Verbalizes/displays adequate comfort level or baseline comfort level: Encourage patient to monitor pain and request assistance     Problem: Skin/Tissue Integrity  Goal: Absence of new skin breakdown  Description: 1.  Monitor for areas of redness and/or skin breakdown  2.  Assess vascular access sites hourly  3.  Every 4-6 hours minimum:  Change oxygen saturation probe site  4.  Every 4-6 hours:  If on nasal continuous positive airway pressure, respiratory therapy assess nares and determine need for appliance change or resting period.  Outcome: Progressing     Problem: Safety - Adult  Goal: Free from fall injury  Outcome: Progressing  Flowsheets (Taken 4/17/2024 2000)  Free From Fall Injury: Instruct family/caregiver on patient safety     Problem: Discharge Planning  Goal: Discharge to home or other facility with appropriate resources  Outcome: Progressing  Flowsheets (Taken 4/17/2024 2000)  Discharge to home or other facility with appropriate resources: Identify barriers to discharge with patient and caregiver

## 2024-04-19 LAB
ANION GAP SERPL CALC-SCNC: 3 MMOL/L (ref 5–15)
ANION GAP SERPL CALC-SCNC: 6 MMOL/L (ref 5–15)
ANION GAP SERPL CALC-SCNC: 6 MMOL/L (ref 5–15)
ANION GAP SERPL CALC-SCNC: 7 MMOL/L (ref 5–15)
BUN SERPL-MCNC: 22 MG/DL (ref 6–20)
BUN SERPL-MCNC: 24 MG/DL (ref 6–20)
BUN SERPL-MCNC: 42 MG/DL (ref 6–20)
BUN SERPL-MCNC: 47 MG/DL (ref 6–20)
BUN/CREAT SERPL: 40 (ref 12–20)
BUN/CREAT SERPL: 51 (ref 12–20)
BUN/CREAT SERPL: 69 (ref 12–20)
BUN/CREAT SERPL: 82 (ref 12–20)
CALCIUM SERPL-MCNC: 9.1 MG/DL (ref 8.5–10.1)
CALCIUM SERPL-MCNC: 9.3 MG/DL (ref 8.5–10.1)
CALCIUM SERPL-MCNC: 9.4 MG/DL (ref 8.5–10.1)
CALCIUM SERPL-MCNC: <10 MG/DL (ref 8.5–10.1)
CHLORIDE SERPL-SCNC: 83 MMOL/L (ref 97–108)
CHLORIDE SERPL-SCNC: 85 MMOL/L (ref 97–108)
CHLORIDE SERPL-SCNC: 87 MMOL/L (ref 97–108)
CHLORIDE SERPL-SCNC: 89 MMOL/L (ref 97–108)
CO2 SERPL-SCNC: 29 MMOL/L (ref 21–32)
CO2 SERPL-SCNC: 31 MMOL/L (ref 21–32)
CO2 SERPL-SCNC: 33 MMOL/L (ref 21–32)
CO2 SERPL-SCNC: 34 MMOL/L (ref 21–32)
CREAT SERPL-MCNC: 0.43 MG/DL (ref 0.55–1.02)
CREAT SERPL-MCNC: 0.57 MG/DL (ref 0.55–1.02)
CREAT SERPL-MCNC: 0.6 MG/DL (ref 0.55–1.02)
CREAT SERPL-MCNC: 0.61 MG/DL (ref 0.55–1.02)
ERYTHROCYTE [DISTWIDTH] IN BLOOD BY AUTOMATED COUNT: 12.3 % (ref 11.5–14.5)
GLUCOSE SERPL-MCNC: 103 MG/DL (ref 65–100)
GLUCOSE SERPL-MCNC: 133 MG/DL (ref 65–100)
GLUCOSE SERPL-MCNC: 143 MG/DL (ref 65–100)
GLUCOSE SERPL-MCNC: 156 MG/DL (ref 65–100)
HCT VFR BLD AUTO: 30.7 % (ref 35–47)
HGB BLD-MCNC: 10.4 G/DL (ref 11.5–16)
MCH RBC QN AUTO: 30.7 PG (ref 26–34)
MCHC RBC AUTO-ENTMCNC: 33.9 G/DL (ref 30–36.5)
MCV RBC AUTO: 90.6 FL (ref 80–99)
NRBC # BLD: 0 K/UL (ref 0–0.01)
NRBC BLD-RTO: 0 PER 100 WBC
PLATELET # BLD AUTO: 262 K/UL (ref 150–400)
PMV BLD AUTO: 10.1 FL (ref 8.9–12.9)
POTASSIUM SERPL-SCNC: 4 MMOL/L (ref 3.5–5.1)
POTASSIUM SERPL-SCNC: 4.2 MMOL/L (ref 3.5–5.1)
POTASSIUM SERPL-SCNC: 4.4 MMOL/L (ref 3.5–5.1)
POTASSIUM SERPL-SCNC: 4.7 MMOL/L (ref 3.5–5.1)
RBC # BLD AUTO: 3.39 M/UL (ref 3.8–5.2)
SODIUM SERPL-SCNC: 120 MMOL/L (ref 136–145)
SODIUM SERPL-SCNC: 121 MMOL/L (ref 136–145)
SODIUM SERPL-SCNC: 126 MMOL/L (ref 136–145)
SODIUM SERPL-SCNC: 126 MMOL/L (ref 136–145)
WBC # BLD AUTO: 11.2 K/UL (ref 3.6–11)

## 2024-04-19 PROCEDURE — 6360000002 HC RX W HCPCS: Performed by: NURSE PRACTITIONER

## 2024-04-19 PROCEDURE — 6360000002 HC RX W HCPCS: Performed by: HOSPITALIST

## 2024-04-19 PROCEDURE — 6370000000 HC RX 637 (ALT 250 FOR IP): Performed by: HOSPITALIST

## 2024-04-19 PROCEDURE — 94760 N-INVAS EAR/PLS OXIMETRY 1: CPT

## 2024-04-19 PROCEDURE — 80048 BASIC METABOLIC PNL TOTAL CA: CPT

## 2024-04-19 PROCEDURE — 6370000000 HC RX 637 (ALT 250 FOR IP): Performed by: NURSE PRACTITIONER

## 2024-04-19 PROCEDURE — 94640 AIRWAY INHALATION TREATMENT: CPT

## 2024-04-19 PROCEDURE — 2700000000 HC OXYGEN THERAPY PER DAY

## 2024-04-19 PROCEDURE — 85027 COMPLETE CBC AUTOMATED: CPT

## 2024-04-19 PROCEDURE — 2580000003 HC RX 258: Performed by: NURSE PRACTITIONER

## 2024-04-19 PROCEDURE — 36415 COLL VENOUS BLD VENIPUNCTURE: CPT

## 2024-04-19 PROCEDURE — 2060000000 HC ICU INTERMEDIATE R&B

## 2024-04-19 RX ADMIN — Medication 15 G: at 20:36

## 2024-04-19 RX ADMIN — IPRATROPIUM BROMIDE AND ALBUTEROL SULFATE 1 DOSE: 2.5; .5 SOLUTION RESPIRATORY (INHALATION) at 14:43

## 2024-04-19 RX ADMIN — ARFORMOTEROL TARTRATE 15 MCG: 15 SOLUTION RESPIRATORY (INHALATION) at 08:20

## 2024-04-19 RX ADMIN — HYDROXYZINE HYDROCHLORIDE 25 MG: 25 TABLET, FILM COATED ORAL at 22:15

## 2024-04-19 RX ADMIN — GUAIFENESIN 600 MG: 600 TABLET, EXTENDED RELEASE ORAL at 22:15

## 2024-04-19 RX ADMIN — Medication 15 G: at 08:48

## 2024-04-19 RX ADMIN — SERTRALINE HYDROCHLORIDE 50 MG: 50 TABLET ORAL at 08:47

## 2024-04-19 RX ADMIN — IPRATROPIUM BROMIDE AND ALBUTEROL SULFATE 1 DOSE: 2.5; .5 SOLUTION RESPIRATORY (INHALATION) at 08:21

## 2024-04-19 RX ADMIN — Medication 100 MG: at 08:48

## 2024-04-19 RX ADMIN — ARFORMOTEROL TARTRATE 15 MCG: 15 SOLUTION RESPIRATORY (INHALATION) at 19:27

## 2024-04-19 RX ADMIN — IPRATROPIUM BROMIDE AND ALBUTEROL SULFATE 1 DOSE: 2.5; .5 SOLUTION RESPIRATORY (INHALATION) at 19:27

## 2024-04-19 RX ADMIN — Medication 10 ML: at 08:55

## 2024-04-19 RX ADMIN — GUAIFENESIN 600 MG: 600 TABLET, EXTENDED RELEASE ORAL at 08:47

## 2024-04-19 RX ADMIN — ENOXAPARIN SODIUM 40 MG: 100 INJECTION SUBCUTANEOUS at 08:47

## 2024-04-19 RX ADMIN — Medication 15 G: at 15:57

## 2024-04-19 RX ADMIN — PREDNISONE 40 MG: 20 TABLET ORAL at 08:47

## 2024-04-19 RX ADMIN — METOPROLOL TARTRATE 50 MG: 50 TABLET, FILM COATED ORAL at 08:47

## 2024-04-19 RX ADMIN — METOPROLOL TARTRATE 50 MG: 50 TABLET, FILM COATED ORAL at 22:14

## 2024-04-19 RX ADMIN — Medication 1 MG: at 08:47

## 2024-04-19 ASSESSMENT — PAIN SCALES - GENERAL
PAINLEVEL_OUTOF10: 0

## 2024-04-19 NOTE — PLAN OF CARE
Problem: Pain  Goal: Verbalizes/displays adequate comfort level or baseline comfort level  Outcome: Progressing  Flowsheets (Taken 4/19/2024 0059)  Verbalizes/displays adequate comfort level or baseline comfort level:   Encourage patient to monitor pain and request assistance   Assess pain using appropriate pain scale   Administer analgesics based on type and severity of pain and evaluate response   Implement non-pharmacological measures as appropriate and evaluate response     Problem: Skin/Tissue Integrity  Goal: Absence of new skin breakdown  Description: 1.  Monitor for areas of redness and/or skin breakdown  2.  Assess vascular access sites hourly  3.  Every 4-6 hours minimum:  Change oxygen saturation probe site  4.  Every 4-6 hours:  If on nasal continuous positive airway pressure, respiratory therapy assess nares and determine need for appliance change or resting period.  Outcome: Progressing     Problem: Safety - Adult  Goal: Free from fall injury  Outcome: Progressing  Flowsheets (Taken 4/19/2024 0059)  Free From Fall Injury: Instruct family/caregiver on patient safety     Problem: Discharge Planning  Goal: Discharge to home or other facility with appropriate resources  Outcome: Progressing  Flowsheets  Taken 4/19/2024 0059  Discharge to home or other facility with appropriate resources: Identify barriers to discharge with patient and caregiver  Taken 4/18/2024 2026  Discharge to home or other facility with appropriate resources: Identify barriers to discharge with patient and caregiver

## 2024-04-20 LAB
ANION GAP SERPL CALC-SCNC: 2 MMOL/L (ref 5–15)
ANION GAP SERPL CALC-SCNC: 4 MMOL/L (ref 5–15)
ANION GAP SERPL CALC-SCNC: 8 MMOL/L (ref 5–15)
BUN SERPL-MCNC: 33 MG/DL (ref 6–20)
BUN SERPL-MCNC: 37 MG/DL (ref 6–20)
BUN SERPL-MCNC: 48 MG/DL (ref 6–20)
BUN/CREAT SERPL: 56 (ref 12–20)
BUN/CREAT SERPL: 73 (ref 12–20)
BUN/CREAT SERPL: 79 (ref 12–20)
CALCIUM SERPL-MCNC: 8.9 MG/DL (ref 8.5–10.1)
CALCIUM SERPL-MCNC: 8.9 MG/DL (ref 8.5–10.1)
CALCIUM SERPL-MCNC: 9.1 MG/DL (ref 8.5–10.1)
CHLORIDE SERPL-SCNC: 83 MMOL/L (ref 97–108)
CHLORIDE SERPL-SCNC: 83 MMOL/L (ref 97–108)
CHLORIDE SERPL-SCNC: 88 MMOL/L (ref 97–108)
CO2 SERPL-SCNC: 31 MMOL/L (ref 21–32)
CO2 SERPL-SCNC: 32 MMOL/L (ref 21–32)
CO2 SERPL-SCNC: 33 MMOL/L (ref 21–32)
CREAT SERPL-MCNC: 0.45 MG/DL (ref 0.55–1.02)
CREAT SERPL-MCNC: 0.47 MG/DL (ref 0.55–1.02)
CREAT SERPL-MCNC: 0.86 MG/DL (ref 0.55–1.02)
ERYTHROCYTE [DISTWIDTH] IN BLOOD BY AUTOMATED COUNT: 12.7 % (ref 11.5–14.5)
GLUCOSE SERPL-MCNC: 114 MG/DL (ref 65–100)
GLUCOSE SERPL-MCNC: 128 MG/DL (ref 65–100)
GLUCOSE SERPL-MCNC: 134 MG/DL (ref 65–100)
HCT VFR BLD AUTO: 33.8 % (ref 35–47)
HGB BLD-MCNC: 11.6 G/DL (ref 11.5–16)
MCH RBC QN AUTO: 31 PG (ref 26–34)
MCHC RBC AUTO-ENTMCNC: 34.3 G/DL (ref 30–36.5)
MCV RBC AUTO: 90.4 FL (ref 80–99)
NRBC # BLD: 0 K/UL (ref 0–0.01)
NRBC BLD-RTO: 0 PER 100 WBC
PLATELET # BLD AUTO: 291 K/UL (ref 150–400)
PMV BLD AUTO: 9.9 FL (ref 8.9–12.9)
POTASSIUM SERPL-SCNC: 3.9 MMOL/L (ref 3.5–5.1)
POTASSIUM SERPL-SCNC: 4 MMOL/L (ref 3.5–5.1)
POTASSIUM SERPL-SCNC: 4.2 MMOL/L (ref 3.5–5.1)
RBC # BLD AUTO: 3.74 M/UL (ref 3.8–5.2)
SODIUM SERPL-SCNC: 118 MMOL/L (ref 136–145)
SODIUM SERPL-SCNC: 122 MMOL/L (ref 136–145)
SODIUM SERPL-SCNC: 124 MMOL/L (ref 136–145)
WBC # BLD AUTO: 13.1 K/UL (ref 3.6–11)

## 2024-04-20 PROCEDURE — 94640 AIRWAY INHALATION TREATMENT: CPT

## 2024-04-20 PROCEDURE — 2580000003 HC RX 258: Performed by: NURSE PRACTITIONER

## 2024-04-20 PROCEDURE — 6360000002 HC RX W HCPCS: Performed by: HOSPITALIST

## 2024-04-20 PROCEDURE — 94760 N-INVAS EAR/PLS OXIMETRY 1: CPT

## 2024-04-20 PROCEDURE — 6360000002 HC RX W HCPCS: Performed by: NURSE PRACTITIONER

## 2024-04-20 PROCEDURE — 80048 BASIC METABOLIC PNL TOTAL CA: CPT

## 2024-04-20 PROCEDURE — 6370000000 HC RX 637 (ALT 250 FOR IP): Performed by: NURSE PRACTITIONER

## 2024-04-20 PROCEDURE — 6370000000 HC RX 637 (ALT 250 FOR IP): Performed by: HOSPITALIST

## 2024-04-20 PROCEDURE — 6370000000 HC RX 637 (ALT 250 FOR IP): Performed by: INTERNAL MEDICINE

## 2024-04-20 PROCEDURE — 85027 COMPLETE CBC AUTOMATED: CPT

## 2024-04-20 PROCEDURE — 36415 COLL VENOUS BLD VENIPUNCTURE: CPT

## 2024-04-20 PROCEDURE — 2700000000 HC OXYGEN THERAPY PER DAY

## 2024-04-20 PROCEDURE — 2060000000 HC ICU INTERMEDIATE R&B

## 2024-04-20 RX ORDER — TOLVAPTAN 15 MG/1
15 TABLET ORAL ONCE
Status: COMPLETED | OUTPATIENT
Start: 2024-04-20 | End: 2024-04-20

## 2024-04-20 RX ADMIN — IPRATROPIUM BROMIDE AND ALBUTEROL SULFATE 1 DOSE: 2.5; .5 SOLUTION RESPIRATORY (INHALATION) at 06:00

## 2024-04-20 RX ADMIN — ACETAMINOPHEN 650 MG: 325 TABLET ORAL at 16:54

## 2024-04-20 RX ADMIN — SERTRALINE HYDROCHLORIDE 50 MG: 50 TABLET ORAL at 08:32

## 2024-04-20 RX ADMIN — Medication 1 MG: at 08:29

## 2024-04-20 RX ADMIN — IPRATROPIUM BROMIDE AND ALBUTEROL SULFATE 1 DOSE: 2.5; .5 SOLUTION RESPIRATORY (INHALATION) at 17:27

## 2024-04-20 RX ADMIN — ARFORMOTEROL TARTRATE 15 MCG: 15 SOLUTION RESPIRATORY (INHALATION) at 21:20

## 2024-04-20 RX ADMIN — Medication 100 MG: at 08:29

## 2024-04-20 RX ADMIN — METOPROLOL TARTRATE 50 MG: 50 TABLET, FILM COATED ORAL at 08:29

## 2024-04-20 RX ADMIN — Medication 10 ML: at 08:30

## 2024-04-20 RX ADMIN — IPRATROPIUM BROMIDE AND ALBUTEROL SULFATE 1 DOSE: 2.5; .5 SOLUTION RESPIRATORY (INHALATION) at 21:20

## 2024-04-20 RX ADMIN — GUAIFENESIN 600 MG: 600 TABLET, EXTENDED RELEASE ORAL at 22:05

## 2024-04-20 RX ADMIN — GUAIFENESIN 600 MG: 600 TABLET, EXTENDED RELEASE ORAL at 08:29

## 2024-04-20 RX ADMIN — IPRATROPIUM BROMIDE AND ALBUTEROL SULFATE 1 DOSE: 2.5; .5 SOLUTION RESPIRATORY (INHALATION) at 12:01

## 2024-04-20 RX ADMIN — TOLVAPTAN 15 MG: 15 TABLET ORAL at 13:22

## 2024-04-20 RX ADMIN — HYDROXYZINE HYDROCHLORIDE 25 MG: 25 TABLET, FILM COATED ORAL at 22:05

## 2024-04-20 RX ADMIN — METOPROLOL TARTRATE 50 MG: 50 TABLET, FILM COATED ORAL at 22:05

## 2024-04-20 RX ADMIN — ENOXAPARIN SODIUM 40 MG: 100 INJECTION SUBCUTANEOUS at 08:29

## 2024-04-20 RX ADMIN — Medication 15 G: at 22:06

## 2024-04-20 RX ADMIN — ARFORMOTEROL TARTRATE 15 MCG: 15 SOLUTION RESPIRATORY (INHALATION) at 06:00

## 2024-04-20 RX ADMIN — Medication 15 G: at 08:30

## 2024-04-20 RX ADMIN — Medication 15 G: at 14:53

## 2024-04-20 ASSESSMENT — PAIN SCALES - GENERAL
PAINLEVEL_OUTOF10: 3
PAINLEVEL_OUTOF10: 0

## 2024-04-20 ASSESSMENT — PAIN DESCRIPTION - LOCATION: LOCATION: OTHER (COMMENT)

## 2024-04-21 LAB
ANION GAP SERPL CALC-SCNC: 1 MMOL/L (ref 5–15)
ANION GAP SERPL CALC-SCNC: 2 MMOL/L (ref 5–15)
ANION GAP SERPL CALC-SCNC: 8 MMOL/L (ref 5–15)
BUN SERPL-MCNC: 45 MG/DL (ref 6–20)
BUN SERPL-MCNC: 49 MG/DL (ref 6–20)
BUN SERPL-MCNC: 50 MG/DL (ref 6–20)
BUN/CREAT SERPL: 78 (ref 12–20)
BUN/CREAT SERPL: 78 (ref 12–20)
BUN/CREAT SERPL: 86 (ref 12–20)
CALCIUM SERPL-MCNC: 8.5 MG/DL (ref 8.5–10.1)
CALCIUM SERPL-MCNC: 9.1 MG/DL (ref 8.5–10.1)
CALCIUM SERPL-MCNC: 9.4 MG/DL (ref 8.5–10.1)
CHLORIDE SERPL-SCNC: 90 MMOL/L (ref 97–108)
CHLORIDE SERPL-SCNC: 91 MMOL/L (ref 97–108)
CHLORIDE SERPL-SCNC: 92 MMOL/L (ref 97–108)
CO2 SERPL-SCNC: 23 MMOL/L (ref 21–32)
CO2 SERPL-SCNC: 32 MMOL/L (ref 21–32)
CO2 SERPL-SCNC: 33 MMOL/L (ref 21–32)
CREAT SERPL-MCNC: 0.58 MG/DL (ref 0.55–1.02)
CREAT SERPL-MCNC: 0.58 MG/DL (ref 0.55–1.02)
CREAT SERPL-MCNC: 0.63 MG/DL (ref 0.55–1.02)
ERYTHROCYTE [DISTWIDTH] IN BLOOD BY AUTOMATED COUNT: 13.5 % (ref 11.5–14.5)
GLUCOSE SERPL-MCNC: 112 MG/DL (ref 65–100)
GLUCOSE SERPL-MCNC: 119 MG/DL (ref 65–100)
GLUCOSE SERPL-MCNC: 148 MG/DL (ref 65–100)
HCT VFR BLD AUTO: 33.5 % (ref 35–47)
HGB BLD-MCNC: 11 G/DL (ref 11.5–16)
MCH RBC QN AUTO: 31.2 PG (ref 26–34)
MCHC RBC AUTO-ENTMCNC: 32.8 G/DL (ref 30–36.5)
MCV RBC AUTO: 94.9 FL (ref 80–99)
NRBC # BLD: 0 K/UL (ref 0–0.01)
NRBC BLD-RTO: 0 PER 100 WBC
PLATELET # BLD AUTO: 293 K/UL (ref 150–400)
PMV BLD AUTO: 10.6 FL (ref 8.9–12.9)
POTASSIUM SERPL-SCNC: 3.6 MMOL/L (ref 3.5–5.1)
POTASSIUM SERPL-SCNC: 4.4 MMOL/L (ref 3.5–5.1)
POTASSIUM SERPL-SCNC: ABNORMAL MMOL/L (ref 3.5–5.1)
RBC # BLD AUTO: 3.53 M/UL (ref 3.8–5.2)
SODIUM SERPL-SCNC: 122 MMOL/L (ref 136–145)
SODIUM SERPL-SCNC: 124 MMOL/L (ref 136–145)
SODIUM SERPL-SCNC: 126 MMOL/L (ref 136–145)
WBC # BLD AUTO: 9.7 K/UL (ref 3.6–11)

## 2024-04-21 PROCEDURE — 6370000000 HC RX 637 (ALT 250 FOR IP): Performed by: INTERNAL MEDICINE

## 2024-04-21 PROCEDURE — 94640 AIRWAY INHALATION TREATMENT: CPT

## 2024-04-21 PROCEDURE — 6360000002 HC RX W HCPCS: Performed by: HOSPITALIST

## 2024-04-21 PROCEDURE — 2580000003 HC RX 258: Performed by: NURSE PRACTITIONER

## 2024-04-21 PROCEDURE — 6370000000 HC RX 637 (ALT 250 FOR IP): Performed by: HOSPITALIST

## 2024-04-21 PROCEDURE — 80048 BASIC METABOLIC PNL TOTAL CA: CPT

## 2024-04-21 PROCEDURE — 2060000000 HC ICU INTERMEDIATE R&B

## 2024-04-21 PROCEDURE — 6370000000 HC RX 637 (ALT 250 FOR IP): Performed by: NURSE PRACTITIONER

## 2024-04-21 PROCEDURE — 6360000002 HC RX W HCPCS: Performed by: NURSE PRACTITIONER

## 2024-04-21 PROCEDURE — 36415 COLL VENOUS BLD VENIPUNCTURE: CPT

## 2024-04-21 PROCEDURE — 2700000000 HC OXYGEN THERAPY PER DAY

## 2024-04-21 PROCEDURE — 85027 COMPLETE CBC AUTOMATED: CPT

## 2024-04-21 RX ORDER — TOLVAPTAN 15 MG/1
15 TABLET ORAL ONCE
Status: COMPLETED | OUTPATIENT
Start: 2024-04-21 | End: 2024-04-21

## 2024-04-21 RX ADMIN — GUAIFENESIN 600 MG: 600 TABLET, EXTENDED RELEASE ORAL at 21:17

## 2024-04-21 RX ADMIN — METOPROLOL TARTRATE 50 MG: 50 TABLET, FILM COATED ORAL at 21:14

## 2024-04-21 RX ADMIN — Medication 10 ML: at 07:54

## 2024-04-21 RX ADMIN — IPRATROPIUM BROMIDE AND ALBUTEROL SULFATE 1 DOSE: 2.5; .5 SOLUTION RESPIRATORY (INHALATION) at 08:26

## 2024-04-21 RX ADMIN — TOLVAPTAN 15 MG: 15 TABLET ORAL at 14:48

## 2024-04-21 RX ADMIN — GUAIFENESIN 600 MG: 600 TABLET, EXTENDED RELEASE ORAL at 07:53

## 2024-04-21 RX ADMIN — Medication 15 G: at 07:53

## 2024-04-21 RX ADMIN — Medication 10 ML: at 21:17

## 2024-04-21 RX ADMIN — Medication 15 G: at 21:17

## 2024-04-21 RX ADMIN — METOPROLOL TARTRATE 50 MG: 50 TABLET, FILM COATED ORAL at 07:53

## 2024-04-21 RX ADMIN — ARFORMOTEROL TARTRATE 15 MCG: 15 SOLUTION RESPIRATORY (INHALATION) at 08:26

## 2024-04-21 RX ADMIN — IPRATROPIUM BROMIDE AND ALBUTEROL SULFATE 1 DOSE: 2.5; .5 SOLUTION RESPIRATORY (INHALATION) at 13:04

## 2024-04-21 RX ADMIN — ARFORMOTEROL TARTRATE 15 MCG: 15 SOLUTION RESPIRATORY (INHALATION) at 21:59

## 2024-04-21 RX ADMIN — IPRATROPIUM BROMIDE AND ALBUTEROL SULFATE 1 DOSE: 2.5; .5 SOLUTION RESPIRATORY (INHALATION) at 21:59

## 2024-04-21 RX ADMIN — IPRATROPIUM BROMIDE AND ALBUTEROL SULFATE 1 DOSE: 2.5; .5 SOLUTION RESPIRATORY (INHALATION) at 15:43

## 2024-04-21 RX ADMIN — SERTRALINE HYDROCHLORIDE 50 MG: 50 TABLET ORAL at 07:54

## 2024-04-21 RX ADMIN — ENOXAPARIN SODIUM 40 MG: 100 INJECTION SUBCUTANEOUS at 07:52

## 2024-04-21 RX ADMIN — Medication 15 G: at 14:52

## 2024-04-21 RX ADMIN — Medication 1 MG: at 07:53

## 2024-04-21 RX ADMIN — Medication 100 MG: at 07:53

## 2024-04-21 ASSESSMENT — PAIN SCALES - GENERAL
PAINLEVEL_OUTOF10: 0

## 2024-04-21 NOTE — PLAN OF CARE
Problem: Pain  Goal: Verbalizes/displays adequate comfort level or baseline comfort level  Outcome: Progressing     Problem: Skin/Tissue Integrity  Goal: Absence of new skin breakdown  Description: 1.  Monitor for areas of redness and/or skin breakdown  2.  Assess vascular access sites hourly  3.  Every 4-6 hours minimum:  Change oxygen saturation probe site  4.  Every 4-6 hours:  If on nasal continuous positive airway pressure, respiratory therapy assess nares and determine need for appliance change or resting period.  Outcome: Progressing     Problem: Safety - Adult  Goal: Free from fall injury  Outcome: Progressing     Problem: Discharge Planning  Goal: Discharge to home or other facility with appropriate resources  Outcome: Progressing

## 2024-04-22 LAB
ANION GAP SERPL CALC-SCNC: 3 MMOL/L (ref 5–15)
ANION GAP SERPL CALC-SCNC: 4 MMOL/L (ref 5–15)
BUN SERPL-MCNC: 26 MG/DL (ref 6–20)
BUN SERPL-MCNC: 35 MG/DL (ref 6–20)
BUN/CREAT SERPL: 53 (ref 12–20)
BUN/CREAT SERPL: 78 (ref 12–20)
CALCIUM SERPL-MCNC: 8.7 MG/DL (ref 8.5–10.1)
CALCIUM SERPL-MCNC: 9.8 MG/DL (ref 8.5–10.1)
CHLORIDE SERPL-SCNC: 92 MMOL/L (ref 97–108)
CHLORIDE SERPL-SCNC: 94 MMOL/L (ref 97–108)
CO2 SERPL-SCNC: 30 MMOL/L (ref 21–32)
CO2 SERPL-SCNC: 33 MMOL/L (ref 21–32)
CREAT SERPL-MCNC: 0.45 MG/DL (ref 0.55–1.02)
CREAT SERPL-MCNC: 0.49 MG/DL (ref 0.55–1.02)
GLUCOSE SERPL-MCNC: 117 MG/DL (ref 65–100)
GLUCOSE SERPL-MCNC: 153 MG/DL (ref 65–100)
POTASSIUM SERPL-SCNC: 3.6 MMOL/L (ref 3.5–5.1)
POTASSIUM SERPL-SCNC: 4.1 MMOL/L (ref 3.5–5.1)
SODIUM SERPL-SCNC: 128 MMOL/L (ref 136–145)
SODIUM SERPL-SCNC: 128 MMOL/L (ref 136–145)

## 2024-04-22 PROCEDURE — 80048 BASIC METABOLIC PNL TOTAL CA: CPT

## 2024-04-22 PROCEDURE — 6370000000 HC RX 637 (ALT 250 FOR IP): Performed by: NURSE PRACTITIONER

## 2024-04-22 PROCEDURE — 2700000000 HC OXYGEN THERAPY PER DAY

## 2024-04-22 PROCEDURE — 6360000002 HC RX W HCPCS: Performed by: HOSPITALIST

## 2024-04-22 PROCEDURE — 36415 COLL VENOUS BLD VENIPUNCTURE: CPT

## 2024-04-22 PROCEDURE — 6360000002 HC RX W HCPCS: Performed by: NURSE PRACTITIONER

## 2024-04-22 PROCEDURE — 2060000000 HC ICU INTERMEDIATE R&B

## 2024-04-22 PROCEDURE — 94760 N-INVAS EAR/PLS OXIMETRY 1: CPT

## 2024-04-22 PROCEDURE — 2580000003 HC RX 258: Performed by: NURSE PRACTITIONER

## 2024-04-22 PROCEDURE — 6370000000 HC RX 637 (ALT 250 FOR IP): Performed by: HOSPITALIST

## 2024-04-22 PROCEDURE — 94640 AIRWAY INHALATION TREATMENT: CPT

## 2024-04-22 PROCEDURE — 6370000000 HC RX 637 (ALT 250 FOR IP): Performed by: INTERNAL MEDICINE

## 2024-04-22 RX ADMIN — ARFORMOTEROL TARTRATE 15 MCG: 15 SOLUTION RESPIRATORY (INHALATION) at 08:05

## 2024-04-22 RX ADMIN — SERTRALINE HYDROCHLORIDE 50 MG: 50 TABLET ORAL at 09:49

## 2024-04-22 RX ADMIN — ENOXAPARIN SODIUM 40 MG: 100 INJECTION SUBCUTANEOUS at 09:49

## 2024-04-22 RX ADMIN — Medication 100 MG: at 09:49

## 2024-04-22 RX ADMIN — HYDROXYZINE HYDROCHLORIDE 25 MG: 25 TABLET, FILM COATED ORAL at 23:32

## 2024-04-22 RX ADMIN — Medication 15 G: at 09:49

## 2024-04-22 RX ADMIN — IPRATROPIUM BROMIDE AND ALBUTEROL SULFATE 1 DOSE: 2.5; .5 SOLUTION RESPIRATORY (INHALATION) at 08:05

## 2024-04-22 RX ADMIN — ARFORMOTEROL TARTRATE 15 MCG: 15 SOLUTION RESPIRATORY (INHALATION) at 20:42

## 2024-04-22 RX ADMIN — METOPROLOL TARTRATE 50 MG: 50 TABLET, FILM COATED ORAL at 09:49

## 2024-04-22 RX ADMIN — GUAIFENESIN 600 MG: 600 TABLET, EXTENDED RELEASE ORAL at 09:49

## 2024-04-22 RX ADMIN — IPRATROPIUM BROMIDE AND ALBUTEROL SULFATE 1 DOSE: 2.5; .5 SOLUTION RESPIRATORY (INHALATION) at 20:42

## 2024-04-22 RX ADMIN — Medication 1 MG: at 09:49

## 2024-04-22 RX ADMIN — IPRATROPIUM BROMIDE AND ALBUTEROL SULFATE 1 DOSE: 2.5; .5 SOLUTION RESPIRATORY (INHALATION) at 13:33

## 2024-04-22 RX ADMIN — Medication 10 ML: at 09:50

## 2024-04-22 RX ADMIN — METOPROLOL TARTRATE 50 MG: 50 TABLET, FILM COATED ORAL at 23:32

## 2024-04-22 RX ADMIN — GUAIFENESIN 600 MG: 600 TABLET, EXTENDED RELEASE ORAL at 23:32

## 2024-04-22 RX ADMIN — Medication 15 G: at 23:33

## 2024-04-22 ASSESSMENT — PAIN SCALES - GENERAL
PAINLEVEL_OUTOF10: 0
PAINLEVEL_OUTOF10: 0

## 2024-04-22 NOTE — PLAN OF CARE
Problem: Pain  Goal: Verbalizes/displays adequate comfort level or baseline comfort level  4/22/2024 0115 by Stew Amato RN  Outcome: Progressing  4/21/2024 1736 by Josué Basilio, RN  Outcome: Progressing     Problem: Skin/Tissue Integrity  Goal: Absence of new skin breakdown  Description: 1.  Monitor for areas of redness and/or skin breakdown  2.  Assess vascular access sites hourly  3.  Every 4-6 hours minimum:  Change oxygen saturation probe site  4.  Every 4-6 hours:  If on nasal continuous positive airway pressure, respiratory therapy assess nares and determine need for appliance change or resting period.  4/22/2024 0115 by Stew Amato RN  Outcome: Progressing  4/21/2024 1736 by Josué Basilio, RN  Outcome: Progressing     Problem: Safety - Adult  Goal: Free from fall injury  4/22/2024 0115 by Stew Amato RN  Outcome: Progressing  4/21/2024 1736 by Josué Basilio, RN  Outcome: Progressing     Problem: Discharge Planning  Goal: Discharge to home or other facility with appropriate resources  4/22/2024 0115 by Stew Amato RN  Outcome: Progressing  4/21/2024 1736 by Josué Basilio, RN  Outcome: Progressing

## 2024-04-23 VITALS
WEIGHT: 113.98 LBS | SYSTOLIC BLOOD PRESSURE: 124 MMHG | BODY MASS INDEX: 20.85 KG/M2 | OXYGEN SATURATION: 98 % | RESPIRATION RATE: 21 BRPM | DIASTOLIC BLOOD PRESSURE: 66 MMHG | TEMPERATURE: 99.6 F | HEART RATE: 80 BPM

## 2024-04-23 LAB
ANION GAP SERPL CALC-SCNC: 4 MMOL/L (ref 5–15)
BUN SERPL-MCNC: 23 MG/DL (ref 6–20)
BUN/CREAT SERPL: 43 (ref 12–20)
CALCIUM SERPL-MCNC: 8.4 MG/DL (ref 8.5–10.1)
CHLORIDE SERPL-SCNC: 97 MMOL/L (ref 97–108)
CO2 SERPL-SCNC: 30 MMOL/L (ref 21–32)
CREAT SERPL-MCNC: 0.53 MG/DL (ref 0.55–1.02)
GLUCOSE SERPL-MCNC: 102 MG/DL (ref 65–100)
POTASSIUM SERPL-SCNC: 4.7 MMOL/L (ref 3.5–5.1)
SODIUM SERPL-SCNC: 131 MMOL/L (ref 136–145)

## 2024-04-23 PROCEDURE — 6370000000 HC RX 637 (ALT 250 FOR IP): Performed by: NURSE PRACTITIONER

## 2024-04-23 PROCEDURE — 6370000000 HC RX 637 (ALT 250 FOR IP): Performed by: HOSPITALIST

## 2024-04-23 PROCEDURE — 6360000002 HC RX W HCPCS: Performed by: HOSPITALIST

## 2024-04-23 PROCEDURE — 80048 BASIC METABOLIC PNL TOTAL CA: CPT

## 2024-04-23 PROCEDURE — 2700000000 HC OXYGEN THERAPY PER DAY

## 2024-04-23 PROCEDURE — 36415 COLL VENOUS BLD VENIPUNCTURE: CPT

## 2024-04-23 PROCEDURE — 6370000000 HC RX 637 (ALT 250 FOR IP): Performed by: INTERNAL MEDICINE

## 2024-04-23 PROCEDURE — 94640 AIRWAY INHALATION TREATMENT: CPT

## 2024-04-23 PROCEDURE — 94760 N-INVAS EAR/PLS OXIMETRY 1: CPT

## 2024-04-23 PROCEDURE — 6360000002 HC RX W HCPCS: Performed by: NURSE PRACTITIONER

## 2024-04-23 RX ORDER — SODIUM CHLORIDE 1 G/1
1 TABLET ORAL 3 TIMES DAILY
Qty: 90 TABLET | Refills: 1 | Status: SHIPPED | OUTPATIENT
Start: 2024-04-23 | End: 2024-04-23 | Stop reason: HOSPADM

## 2024-04-23 RX ORDER — BUSPIRONE HYDROCHLORIDE 5 MG/1
5 TABLET ORAL 2 TIMES DAILY
Status: DISCONTINUED | OUTPATIENT
Start: 2024-04-23 | End: 2024-04-23 | Stop reason: HOSPADM

## 2024-04-23 RX ORDER — SODIUM CHLORIDE 1 G/1
1 TABLET ORAL
Qty: 90 TABLET | Refills: 3 | Status: SHIPPED | OUTPATIENT
Start: 2024-04-23

## 2024-04-23 RX ORDER — BUSPIRONE HYDROCHLORIDE 5 MG/1
5 TABLET ORAL 2 TIMES DAILY
Qty: 60 TABLET | Refills: 1 | Status: SHIPPED | OUTPATIENT
Start: 2024-04-23 | End: 2024-06-22

## 2024-04-23 RX ORDER — SODIUM CHLORIDE 1 G/1
1 TABLET ORAL
Status: DISCONTINUED | OUTPATIENT
Start: 2024-04-23 | End: 2024-04-23 | Stop reason: HOSPADM

## 2024-04-23 RX ORDER — BUSPIRONE HYDROCHLORIDE 5 MG/1
5 TABLET ORAL 2 TIMES DAILY
Qty: 60 TABLET | Refills: 1 | Status: SHIPPED | OUTPATIENT
Start: 2024-04-23 | End: 2024-04-23

## 2024-04-23 RX ADMIN — IPRATROPIUM BROMIDE AND ALBUTEROL SULFATE 1 DOSE: 2.5; .5 SOLUTION RESPIRATORY (INHALATION) at 07:19

## 2024-04-23 RX ADMIN — METOPROLOL TARTRATE 50 MG: 50 TABLET, FILM COATED ORAL at 10:02

## 2024-04-23 RX ADMIN — ARFORMOTEROL TARTRATE 15 MCG: 15 SOLUTION RESPIRATORY (INHALATION) at 07:19

## 2024-04-23 RX ADMIN — Medication 1 MG: at 10:02

## 2024-04-23 RX ADMIN — Medication 100 MG: at 10:02

## 2024-04-23 RX ADMIN — SODIUM CHLORIDE 1 G: 1 TABLET ORAL at 13:01

## 2024-04-23 RX ADMIN — GUAIFENESIN 600 MG: 600 TABLET, EXTENDED RELEASE ORAL at 10:02

## 2024-04-23 RX ADMIN — ALBUTEROL SULFATE 1 DOSE: 2.5 SOLUTION RESPIRATORY (INHALATION) at 16:04

## 2024-04-23 RX ADMIN — ENOXAPARIN SODIUM 40 MG: 100 INJECTION SUBCUTANEOUS at 10:02

## 2024-04-23 ASSESSMENT — PAIN SCALES - GENERAL: PAINLEVEL_OUTOF10: 0

## 2024-04-23 NOTE — DISCHARGE SUMMARY
(~21 ounces?)  - continue thiamine and folic acid      PENDING TEST RESULTS:   At the time of discharge the following test results are still pending: none    FOLLOW UP APPOINTMENTS:    Follow-up Information    None           ADDITIONAL CARE RECOMMENDATIONS:     DIET: regular diet    ACTIVITY: activity as tolerated        EQUIPMENT needed: Already has home oxygen.      DISCHARGE MEDICATIONS:     Medication List        START taking these medications      busPIRone 5 MG tablet  Commonly known as: BUSPAR  Take 1 tablet by mouth 2 times daily            CHANGE how you take these medications      sodium chloride 1 g tablet  Take 1 tablet by mouth 3 times daily  What changed: when to take this            CONTINUE taking these medications      albuterol (2.5 MG/3ML) 0.083% NEBU 3 mL, ipratropium 0.5 mg-albuterol 2.5 mg 0.5-2.5 (3) MG/3ML SOLN 3 mL     Anoro Ellipta 62.5-25 MCG/ACT inhaler  Generic drug: umeclidinium-vilanterol     diphenhydrAMINE 25 MG tablet  Commonly known as: BENADRYL     folic acid 1 MG tablet  Commonly known as: FOLVITE  Take 1 tablet by mouth daily     furosemide 20 MG tablet  Commonly known as: LASIX     hydrOXYzine HCl 25 MG tablet  Commonly known as: ATARAX     ipratropium 0.5 mg-albuterol 2.5 mg 0.5-2.5 (3) MG/3ML Soln nebulizer solution  Commonly known as: DUONEB     metoprolol tartrate 25 MG tablet  Commonly known as: LOPRESSOR  Take 1 tablet by mouth 2 times daily     Ventolin  (90 Base) MCG/ACT inhaler  Generic drug: albuterol sulfate HFA     vitamin B-1 100 MG tablet  Commonly known as: THIAMINE  Take 1 tablet by mouth daily            STOP taking these medications      nicotine 14 MG/24HR  Commonly known as: NICODERM CQ     predniSONE 20 MG tablet  Commonly known as: DELTASONE     sertraline 50 MG tablet  Commonly known as: ZOLOFT     therapeutic multivitamin-minerals tablet     urea 15 g Pack packet  Commonly known as: URE-NA               Where to Get Your Medications        These

## 2024-04-23 NOTE — CARE COORDINATION
CM set up transportation with [employer]:  Van with oxygen (2L) for 1700 to residence [address] [facility] [address]. CM will continue to follow.     [facility] appointment made for May 2nd at 3:30 PM at the [facility]. AVS updated. Pt notified.    
Care Management Initial Assessment       RUR: 13%  Readmission? No  1st IM letter given? No  1st  letter given: No    The patient is from home, lives with a friend, Melvin Mcdonald in a town home and plans to return there when discharge and Melvin will transport. The patient uses a nebulizer, has home 02 and states 2 liters is her baseline. The patient states she cannot recall the name of the 02 agency but she owes them money. The patient does not use any other dme, does not drive a vehicle, uses [facility] for PCP and needed medications. The patient last had appointment at [facility] last month and has another appointment in May. The patient states she has disability paperwork she is planning to file and hasn't done so yet. The patient has an estranged , Paul that works for [employer] and per the patient, he has insurance through the [facility], but she is not on his insurance. CM sent request for [employer] to screen patient for medicaid services. The patient plans to return to her friend Mariam Mcdonald residence at [address] [place] [address], [address], [address]. CM following for discharge needs.      04/17/24 0234   Service Assessment   Patient Orientation Alert and Oriented   Cognition Alert   History Provided By Patient   Primary Caregiver Self   Support Systems Friends/Neighbors   Patient's Healthcare Decision Maker is: Legal Next of Kin   PCP Verified by CM Yes  (Established at [facility])   Last Visit to PCP Within last 3 months   Prior Functional Level Independent in ADLs/IADLs   Current Functional Level Independent in ADLs/IADLs   Can patient return to prior living arrangement Unknown at present   Ability to make needs known: Good   Family able to assist with home care needs: Other (comment)  (Friend, Melvin Mcdonald)   Would you like for me to discuss the discharge plan with any other family members/significant others, and if so, who? No   Social/Functional History   Lives With 
Transition of Care Plan:    RUR: 12%  Prior Level of Functioning: independent   Disposition: home with friend   If SNF or IPR: Date FOC offered:   Date FOC received:   Accepting facility:   Date authorization started with reference number:   Date authorization received and expires:   Follow up appointments: to be scheduled   DME needed: none  Transportation at discharge: friend   IM/IMM Medicare/ letter given: n/a  Caregiver Contact: Melvin Mcdonald [phone]  Discharge Caregiver contacted prior to discharge? Upon patient request  Care Conference needed? no  Barriers to discharge: none      Patient to likely d/c today. Returning to friends home, Melvin Mcdonald.         Heather Finnegan  Care Manager  [id number]    
Transition of Care Plan:    Update 11:08am: The patient plans to discharge home to friend's home (Melvin Bhakta). Melvin will  be transporting. The patient states she has o2 at home and will not need 02 for transport. Patient declines CM to set up transport with o2. The patient has no insurance. The patient is established with [facility] and follows up with them for medications and needs. The patient provided CM with estranged 's son's contact information (Roshan Myers ([phone]) to relay to [facility] in efforts to assist with medicaid screening and gaining contact info on the estranged , Paul Myers . CM emailed [facility] this contact info and also called but was unable to reach contact.     8:34am: The patient plans to discharge home to friendHarry's home. The patient has no insurance and [facility] is screening patient for medicaid trying to reach patient's estranged  for financial information. The patient may need assistance with transport.     RUR: N/A   Prior Level of Functioning: Independent  Disposition: Return to friend's home.   Follow up appointments: Per MD's recommendations  DME needed: None  Transportation at discharge: Harry Dyer  IM/IMM Medicare/ letter given: No   Is patient a [place] and connected with [facility]?No    If yes, was  transfer form completed and VA notified?   Caregiver Contact: Harry Charles  Discharge Caregiver contacted prior to discharge? Per patient's preference   Care Conference needed? No   Barriers to discharge: Medical stability.     Prachi Bey RN/CRM  [phone]    
.

## 2024-04-23 NOTE — PROGRESS NOTES
[facility]                                                                                          Hospitalist Progress Note  Douglas Zuleta MD  Office Phone: [phone]        Date of Service:  2024  NAME:  Ronald Coffey  :  1965  MRN:  [id number]       Admission Summary:   Ronald Coffey is a 58 y.o. female with a history of COPD, tobacco and alcohol abuse who presented to the ED for shortness of breath, wears 3 L nasal cannula at baseline.  Patient reported increasing dyspnea overnight -  she called EMS and was given 1 DuoNeb en route to the ED with only slight improvement.  States she has a cough that is productive with yellowish sputum. She was found to be hyponatremic with labs in the ED.      Patient was seen and examined the ED this morning, she was lying on the ED stretcher in no acute distress though reports that she feels \"really bad\".  Patient was not a good historian, had a bag full of medicines at bedside that has been filled at different times, she is unable to report what medication she is actively taking, which when she is supposed to be taking and which when she has stopped taking.  She reports she is, for the most part, homeless and lives with \"a nice person\" that took her in.  She had previously been living at her mother's old home but reports she was \"kicked out\" by her brother.  [facility] and has Medicaid per case management so should not have difficulties affording her medications or her treatments.  Patient reports a persistent tobacco use, smokes ~1 pack of cigarettes weekly and is a current active alcohol user though reports she cannot quantify the amount of beer she uses on a daily basis.     Medications were reviewed from the bag of medicine she had at bedside.  In looking at the bottles and per her dispense report, it appears as though these were filled in 2023.  She does not appear to be 
        [facility]                                                                                          Hospitalist Progress Note  Ashly Shelby MD  Office Phone: [phone]        Date of Service:  2024  NAME:  Ronald Coffey  :  1965  MRN:  [id number]       Admission Summary:   Ronald Coffey is a 58 y.o. female with a history of COPD, tobacco and alcohol abuse who presented to the ED for shortness of breath, wears 3 L nasal cannula at baseline.  Patient reported increasing dyspnea overnight -  she called EMS and was given 1 DuoNeb en route to the ED with only slight improvement.  States she has a cough that is productive with yellowish sputum. She was found to be hyponatremic with labs in the ED.      Patient was seen and examined the ED this morning, she was lying on the ED stretcher in no acute distress though reports that she feels \"really bad\".  Patient was not a good historian, had a bag full of medicines at bedside that has been filled at different times, she is unable to report what medication she is actively taking, which when she is supposed to be taking and which when she has stopped taking.  She reports she is, for the most part, homeless and lives with \"a nice person\" that took her in.  She had previously been living at her mother's old home but reports she was \"kicked out\" by her brother.  [facility] and has Medicaid per case management so should not have difficulties affording her medications or her treatments.  Patient reports a persistent tobacco use, smokes ~1 pack of cigarettes weekly and is a current active alcohol user though reports she cannot quantify the amount of beer she uses on a daily basis.     Medications were reviewed from the bag of medicine she had at bedside.  In looking at the bottles and per her dispense report, it appears as though these were filled in 2023.  She does not appear to 
        [facility]                                                                                          Hospitalist Progress Note  Ashly Shelby MD  Office Phone: [phone]        Date of Service:  2024  NAME:  Ronald Coffey  :  1965  MRN:  [id number]       Admission Summary:   Ronald Coffey is a 58 y.o. female with a history of COPD, tobacco and alcohol abuse who presented to the ED for shortness of breath, wears 3 L nasal cannula at baseline.  Patient reported increasing dyspnea overnight -  she called EMS and was given 1 DuoNeb en route to the ED with only slight improvement.  States she has a cough that is productive with yellowish sputum. She was found to be hyponatremic with labs in the ED.      Patient was seen and examined the ED this morning, she was lying on the ED stretcher in no acute distress though reports that she feels \"really bad\".  Patient was not a good historian, had a bag full of medicines at bedside that has been filled at different times, she is unable to report what medication she is actively taking, which when she is supposed to be taking and which when she has stopped taking.  She reports she is, for the most part, homeless and lives with \"a nice person\" that took her in.  She had previously been living at her mother's old home but reports she was \"kicked out\" by her brother.  [facility] and has Medicaid per case management so should not have difficulties affording her medications or her treatments.  Patient reports a persistent tobacco use, smokes ~1 pack of cigarettes weekly and is a current active alcohol user though reports she cannot quantify the amount of beer she uses on a daily basis.     Medications were reviewed from the bag of medicine she had at bedside.  In looking at the bottles and per her dispense report, it appears as though these were filled in 2023.  She does not appear to 
        [facility]                                                                                          Hospitalist Progress Note  Ashly Shelby MD  Office Phone: [phone]        Date of Service:  2024  NAME:  Ronald Coffey  :  1965  MRN:  [id number]       Admission Summary:   Ronald Coffey is a 58 y.o. female with a history of COPD, tobacco and alcohol abuse who presented to the ED for shortness of breath, wears 3 L nasal cannula at baseline.  Patient reported increasing dyspnea overnight -  she called EMS and was given 1 DuoNeb en route to the ED with only slight improvement.  States she has a cough that is productive with yellowish sputum. She was found to be hyponatremic with labs in the ED.      Patient was seen and examined the ED this morning, she was lying on the ED stretcher in no acute distress though reports that she feels \"really bad\".  Patient was not a good historian, had a bag full of medicines at bedside that has been filled at different times, she is unable to report what medication she is actively taking, which when she is supposed to be taking and which when she has stopped taking.  She reports she is, for the most part, homeless and lives with \"a nice person\" that took her in.  She had previously been living at her mother's old home but reports she was \"kicked out\" by her brother.  [facility] and has Medicaid per case management so should not have difficulties affording her medications or her treatments.  Patient reports a persistent tobacco use, smokes ~1 pack of cigarettes weekly and is a current active alcohol user though reports she cannot quantify the amount of beer she uses on a daily basis.     Medications were reviewed from the bag of medicine she had at bedside.  In looking at the bottles and per her dispense report, it appears as though these were filled in 2023.  She does not appear to 
   04/18/24 0801   Oxygen Therapy/Pulse Ox   O2 Therapy Oxygen   O2 Device Nasal cannula   O2 Flow Rate (L/min) 2 L/min  (weaned , per patient baseline)   SpO2 100 %       
  [employer] [facility]      Nephrology Progress Note  Ronald Coffey  Date of Admission : 4/15/2024    CC:  Follow up for acute on chronic hyponatremia       Assessment and Plan     Hyponatremia   - acute on chronic, SIADH, chronic alcoholism  - worsened by pulm issues  - d/c on salt tabs 1 gm TID, FR 40 oz/d  - strongly advised to refrain from drinking beer(heavy intermittent drinker)     Hypo K   - stable     HTN  Tachycardia   - on toprol     ETOH/ Tobacco Use  - on nicotine patch  - thiamine/folic acid    COPD  - abx/ jet nebs/ steroids per primary team         Interval History:  Patient seen and examined. Na at 132. Reports anxiety, coming odd zoloft. Reports drinking 40-60 oz beer on weekends    Current Medications: all current  Medications have been eviewed in EPIC  Review of Systems: Pertinent items are noted in HPI.    Objective:  Vitals:    Vitals:    04/23/24 0722 04/23/24 1000 04/23/24 1035 04/23/24 1200   BP:   108/76    Pulse: 82 85 83 73   Resp: 21  18    Temp:   98.4 °F (36.9 °C)    TempSrc:   Oral    SpO2: 98%  98%    Weight:         Intake and Output:  No intake/output data recorded.  04/21 1901 - 04/23 0700  In: 635 [P.O.:635]  Out: 2650 [Urine:2650]    Physical Examination:  General: NAD,Conversant   Neck:  Supple, no mass  Resp:  Decreased BS, no wheezing , normal respiratory effort on 3L NC  CV:  Regular Rate/ Rhythm,  no murmur or rub,no  LE edema  GI:  Soft, NT, + Bowel sounds  Neurologic:  Non focal  Psych:             AAO x 3 appropriate affect   Skin:  No Rash  :  voiding    LABS:  Recent Labs     04/23/24  0602 04/22/24  1117 04/22/24  0426 04/18/24  2308 04/18/24  1010   * 128* 128*   < > 124*   K 4.7 3.6 4.1   < > 3.0*   CL 97 92* 94*   < > 85*   CO2 30 33* 30   < > 34*   BUN 23* 26* 35*   < > 8   CREATININE 0.53* 0.49* 0.45*   < > 0.47*   CALCIUM 8.4* 9.8 8.7   < > 8.9   PHOS  --   --   --   --  2.6    < > = values in this interval not displayed.       Recent Labs 
  [employer] [facility]      Nephrology Progress Note  Ronald Coffey  Date of Admission : 4/15/2024    CC:  Follow up for acute on chronic hyponatremia       Assessment and Plan     Acute on chronic hyponatremia with COPD/ use of Zoloft  - suspect underlying SIADH (baseline Na 128-130)    - required tolvaptan/ urea with 12/2023 admision  - presenting ->117-> 120->127->124->126  - 4/19 Uos 521, Robert 92, uric acid 1.2  - s/p 3%  x 3 - last given 4/17  - continue Urea 15 mg TID for now.  - encourage increase solute intake  - FR 1500 ml  - strict I&O  - continue with q 8 hr labs   - Ordered Tolvaptan 15 mg one time dose today.    Anemia   - Hgb stable/ improving  - request iron studies/ b12/ folate  - transfuse for Hgb <7    Hypokalemia  - repeat K stable   - recommend nutritional supplement with low albumin/ K    HTN  - BP stable on current regimen    ETOH/ Tobacco Use  - on nicotine patch  - thiamine/folic acid    COPD  - abx/ jet nebs/ steroids per primary team         Interval History:  Patient seen and examined this AM. Sodium low today.    Current Medications: all current  Medications have been eviewed in EPIC  Review of Systems: Pertinent items are noted in HPI.    Objective:  Vitals:    Vitals:    04/20/24 0600 04/20/24 0815 04/20/24 1000 04/20/24 1100   BP:  135/73  121/67   Pulse: 77 76 69 69   Resp:  16  18   Temp:  98.8 °F (37.1 °C)  98.8 °F (37.1 °C)   TempSrc:  Oral  Oral   SpO2:  95%  95%   Weight:         Intake and Output:  No intake/output data recorded.  04/18 1901 - 04/20 0700  In: 1200 [P.O.:1200]  Out: 2500 [Urine:2500]    Physical Examination:  General: NAD,Conversant   Neck:  Supple, no mass  Resp:  Decreased BS, no wheezing , normal respiratory effort on 3L NC  CV:  Regular Rate/ Rhythm,  no murmur or rub,no  LE edema  GI:  Soft, NT, + Bowel sounds  Neurologic:  Non focal  Psych:             AAO x 3 appropriate affect   Skin:  No Rash  :  voiding    LABS:  Recent Labs     
  [employer] [facility]      Nephrology Progress Note  Ronald Coffey  Date of Admission : 4/15/2024    CC:  Follow up for acute on chronic hyponatremia       Assessment and Plan     Acute on chronic hyponatremia with COPD/ use of Zoloft  - suspect underlying SIADH (baseline Na 128-130)    - required tolvaptan/ urea with 12/2023 admision  - presenting ->117-> 120->127->124  - 4/19 Uos 521, Robert 92, uric acid 1.2  - s/p 3%  x 3 - last given 4/17  - add Urea 15 mg TID  - encourage increase solute intake  - FR 1800 ml  - strict I&O  - continue with q 8 hr labs     Anemia   - Hgb 9.2   - request iron studies/ b12/ folate  - transfuse for Hgb <7    Hypokalemia  - KCL 40 mg BID previously ordered  - recommend nutritional supplement with low albumin/ K    HTN  - BP stable on current regimen    ETOH/ Tobacco Use  - on nicotine patch  - thiamine/folic acid    COPD  - abx/ jet nebs/ steroids per primary team         Interval History:  Patient seen and examined this AM. She continues with poor oral intake. Na improved 127 now dropped again. Still no I&O documented.    Current Medications: all current  Medications have been eviewed in EPIC  Review of Systems: Pertinent items are noted in HPI.    Objective:  Vitals:    Vitals:    04/18/24 1004 04/18/24 1009 04/18/24 1042 04/18/24 1201   BP: 132/78      Pulse: 93 86  73   Resp:       Temp:   98.2 °F (36.8 °C)    TempSrc:   Axillary    SpO2:       Weight:         Intake and Output:  No intake/output data recorded.  No intake/output data recorded.    Physical Examination:  General: NAD,Conversant   Neck:  Supple, no mass  Resp:  Decreased BS, no wheezing , normal respiratory effort on 3L NC  CV:  Regular Rate/ Rhythm,  no murmur or rub,no  LE edema  GI:  Soft, NT, + Bowel sounds  Neurologic:  Non focal  Psych:             AAO x 3 appropriate affect   Skin:  No Rash  :  voiding    LABS:  Recent Labs     04/18/24  1010 04/18/24  0432 04/17/24  2051   * 127* 
  [employer] [facility]      Nephrology Progress Note  Ronald Coffey  Date of Admission : 4/15/2024    CC:  Follow up for acute on chronic hyponatremia       Assessment and Plan     Acute on chronic hyponatremia with COPD/ use of Zoloft  - suspect underlying SIADH (baseline Na 128-130)    - required tolvaptan/ urea with 12/2023 admision  - presenting ->117-> 120->127->124->126  - 4/19 Uos 521, Robert 92, uric acid 1.2  - s/p 3%  x 3 - last given 4/17  - continue Urea 15 mg TID  - encourage increase solute intake  - FR 1500 ml  - strict I&O  - continue with q 8 hr labs     Anemia   - Hgb stable/ improving  - request iron studies/ b12/ folate  - transfuse for Hgb <7    Hypokalemia  - repeat K stable   - recommend nutritional supplement with low albumin/ K    HTN  - BP stable on current regimen    ETOH/ Tobacco Use  - on nicotine patch  - thiamine/folic acid    COPD  - abx/ jet nebs/ steroids per primary team         Interval History:  Patient seen and examined this AM. She tolerated Urea yesterday Na stable continue to encourage oral solute intake. UOP good.     Current Medications: all current  Medications have been eviewed in EPIC  Review of Systems: Pertinent items are noted in HPI.    Objective:  Vitals:    Vitals:    04/19/24 0718 04/19/24 0822 04/19/24 0847 04/19/24 0953   BP: (!) 141/80  (!) 150/79    Pulse: 72 75 80 85   Resp: 21 19     Temp: 98.2 °F (36.8 °C)      TempSrc: Oral      SpO2: 98% 95%     Weight: 50.3 kg (111 lb)        Intake and Output:  No intake/output data recorded.  04/17 1901 - 04/19 0700  In: 600 [P.O.:600]  Out: 900 [Urine:900]    Physical Examination:  General: NAD,Conversant   Neck:  Supple, no mass  Resp:  Decreased BS, no wheezing , normal respiratory effort on 3L NC  CV:  Regular Rate/ Rhythm,  no murmur or rub,no  LE edema  GI:  Soft, NT, + Bowel sounds  Neurologic:  Non focal  Psych:             AAO x 3 appropriate affect   Skin:  No 
  [employer] [facility]      Nephrology Progress Note  Ronald Coffey  Date of Admission : 4/15/2024    CC:  Follow up for acute on chronic hyponatremia       Assessment and Plan     Hyponatremia   - acute on chronic   - SIADH-- worsened by pulm issues  - stop Zoloft   - reduce Ure-Na to 15 gm BID, FR 1200 cc/ day   - labs daily and d/c when Na > 130 for 24 hrs     Hypo K   - stable     HTN  Tachycardia   - on toprol     ETOH/ Tobacco Use  - on nicotine patch  - thiamine/folic acid    COPD  - abx/ jet nebs/ steroids per primary team         Interval History:  Patient seen and examined. Na at 128 after a dose of tolvaptan   Discussed stopping zoloft - watch for withdrawal     Current Medications: all current  Medications have been eviewed in EPIC  Review of Systems: Pertinent items are noted in HPI.    Objective:  Vitals:    Vitals:    04/22/24 0600 04/22/24 0800 04/22/24 0807 04/22/24 1000   BP:       Pulse: 82 78 84 (!) 111   Resp:   19    Temp:       TempSrc:       SpO2:   97%    Weight:         Intake and Output:  No intake/output data recorded.  04/20 1901 - 04/22 0700  In: 1936 [P.O.:1936]  Out: 6400 [Urine:6400]    Physical Examination:  General: NAD,Conversant   Neck:  Supple, no mass  Resp:  Decreased BS, no wheezing , normal respiratory effort on 3L NC  CV:  Regular Rate/ Rhythm,  no murmur or rub,no  LE edema  GI:  Soft, NT, + Bowel sounds  Neurologic:  Non focal  Psych:             AAO x 3 appropriate affect   Skin:  No Rash  :  voiding    LABS:  Recent Labs     04/22/24  0426 04/21/24  1913 04/21/24  1121 04/18/24  2308 04/18/24  1010   * 126* 124*   < > 124*   K 4.1 4.4 3.6   < > 3.0*   CL 94* 92* 90*   < > 85*   CO2 30 32 33*   < > 34*   BUN 35* 45* 50*   < > 8   CREATININE 0.45* 0.58 0.58   < > 0.47*   CALCIUM 8.7 9.1 9.4   < > 8.9   PHOS  --   --   --   --  2.6    < > = values in this interval not displayed.       Recent Labs     04/21/24  0259 04/20/24  0337 04/19/24  0734   WBC 
  [employer] [facility]      Nephrology Progress Note  Ronald Coffey  Date of Admission : 4/15/2024    CC:  Follow up for acute on chronic hyponatremia       Assessment and Plan     Acute on chronic hyponatremia with COPD/ use of Zoloft  - suspect underlying SIADH (baseline Na 128-130)    - required tolvaptan/ urea with 12/2023 admision  - 4/19 Uos 521, Robert 92, uric acid 1.2  - s/p 3%  x 3 - last given 4/17  - continue Urea 15 mg TID for now.  - encourage increase solute intake  - FR 1500 ml  - strict I&O  - continue with q 8 hr labs   - Ordered Tolvaptan 15 mg one more time today( received one dose yesterday)    Anemia   - Hgb stable/ improving  - request iron studies/ b12/ folate  - transfuse for Hgb <7    Hypokalemia  - repeat K stable   - recommend nutritional supplement with low albumin/ K    HTN  - BP stable on current regimen    ETOH/ Tobacco Use  - on nicotine patch  - thiamine/folic acid    COPD  - abx/ jet nebs/ steroids per primary team         Interval History:  Patient seen and examined this AM. Sodium labile, now back upto 124 today.    Current Medications: all current  Medications have been eviewed in EPIC  Review of Systems: Pertinent items are noted in HPI.    Objective:  Vitals:    Vitals:    04/21/24 1000 04/21/24 1133 04/21/24 1200 04/21/24 1305   BP:  101/74     Pulse: 70 78 88 90   Resp:  16  21   Temp:  98.6 °F (37 °C)     TempSrc:  Oral     SpO2:  98%  96%   Weight:         Intake and Output:  No intake/output data recorded.  04/19 1901 - 04/21 0700  In: 1380 [P.O.:1380]  Out: 3130 [Urine:3130]    Physical Examination:  General: NAD,Conversant   Neck:  Supple, no mass  Resp:  Decreased BS, no wheezing , normal respiratory effort on 3L NC  CV:  Regular Rate/ Rhythm,  no murmur or rub,no  LE edema  GI:  Soft, NT, + Bowel sounds  Neurologic:  Non focal  Psych:             AAO x 3 appropriate affect   Skin:  No Rash  :  voiding    LABS:  Recent Labs     04/21/24  1121 04/21/24  0259 
  [facility]         NAME:Ronald Coffey  MRN:[id number]   :1965     Na 120 after 3% @ 2100  Ordered 3% @ 30cc/hr for 4 hrs  Strict IO  Repeat labs after 3% infusion      Hyponatremia [E87.1]  Tobacco abuse [Z72.0]  COPD exacerbation (HCC) [J44.1]     Minor Wren, APRN - NP  [facility]                                                                     
0000 Received Pt from [facility]    8130 3% hypertonic 20ml/hr started (received from pharmacy late)    0230 Infusion complete     0300 Repeat metabolic    0500 resulted Na 120; contacted nephrology on call for further orders    0700 No return call from nephro. Called again to follow up.        
2200 Na 120 after 3% hypertonic infusion     2220 Contacted nephro on call to update    2230 Nephro returned call    2305 Waiting for pharm verification for 3% hypertonic 30/hr over 4 hrs    2345 3% hypertonic sodium chloride started    0345 3% hypertonic sodium chloride ended     0430 Na redraw     0550 Na 127    0600 Contacted nephro on call to update   
Admission Medication Reconciliation:    Information obtained from:  Patient, medication bottles  RxQuery data available¹:  Yes    Comments/Recommendations: Unable to update PTA meds/reviewed patient's allergies.    1)  Patient reports that she is not \"skilled\" in managing her own medications and that she does not know what she takes. She occasionally uses Benadryl for her ragweed allergy. Patient has one box of her Duoneb vials left, and also occasionally uses the Anoro inhaler.     2)  No changes have been made to the patient's PTA med list since last review.     ¹RxQuery pharmacy benefit data reflects medications filled and processed through the patient's insurance, however   this data does NOT capture whether the medication was picked up or is currently being taken by the patient.    Allergies:  Patient has no known allergies.    Significant PMH/Disease States:   Past Medical History:   Diagnosis Date    COPD (chronic obstructive pulmonary disease) (HCC)     HTN (hypertension)     Smokes 1 to 4 cigarettes per day      Chief Complaint for this Admission:    Chief Complaint   Patient presents with    Shortness of Breath     Prior to Admission Medications:   Prior to Admission Medications   Prescriptions Last Dose Informant   albuterol (2.5 MG/3ML) 0.083% NEBU 3 mL, ipratropium 0.5 mg-albuterol 2.5 mg 0.5-2.5 (3) MG/3ML SOLN 3 mL 4/14/2024    Sig: Take 1 Dose by nebulization every 4 hours as needed for Wheezing   albuterol sulfate HFA (VENTOLIN HFA) 108 (90 Base) MCG/ACT inhaler 4/14/2024    Sig: Inhale 2 puffs into the lungs every 6 hours as needed for Wheezing   diphenhydrAMINE (BENADRYL) 25 MG tablet     Sig: Take 1 tablet by mouth every 6 hours as needed for Allergies   folic acid (FOLVITE) 1 MG tablet Unknown    Sig: Take 1 tablet by mouth daily   furosemide (LASIX) 20 MG tablet Unknown    Sig: Take 1 tablet by mouth daily   hydrOXYzine HCl (ATARAX) 25 MG tablet Past Week    Sig: Take 1 tablet by mouth every 8 
Hospital follow-up PCP transitional care appointment has been scheduled with Dr. Bettye Bowser for Thursday, May 2nd, 2024 at 3:30p.m.  Pending patient discharge.  Marcia Sage, Care Management Assistant   
Late entry: Patient seen and examined independently from NP student. Patient is resting quietly in bed in NAD- she admits to daily ETOH intake. She denies nausea or vomiting this AM. She states she has been taking her Na tabs as directed. She is currently on NS @ 50 cc/hr - no repeat labs since admission.  There is concentrated UOP in canister. No urine studies  Her baseline creatinine 128-130.  Request Robert, uOs, uK  Repeat labs now  Continue with FR and NS until repeat labs result.  Avoid HCTZ diuretics.    
Patient seen and examined, resting in bed comfortably, on oxygen, no complaints.  Her sodium continues to improve, 131 this a.m.  Zoloft held since yesterday.  Urea tablets discontinued since she could not for them on discharge, restarted on salt tablets.  Patient states she is prescribed the Zoloft for anxiety, denies diagnosis of depression, SI or HI.  I discussed with her that we believe the Zoloft contributing significantly to the hyponatremia/SIADH and we recommended stopping it, she agreed.  She does not know exactly how long she has been on it, she mentioned may be 8 months.  Discussed with pharmacist, we started her on low-dose BuSpar.  Discussed with case management, she will need oxygen for transport, she has home oxygen.  I have asked CM to help set up post discharge follow-up with her PCP/Crossover within a week.  
Pulmonary Disease Navigator Note  [facility]    Current GOLD classification for Ronald Coffey    Patient's chart was reviewed by Pulmonary Disease Navigator for compliance with prescribed treatment with [employer] Initiative For Chronic Obstructive Lung Disease (GOLD).    Please, review beneath recommendations for pharmacological treatment for patient with obstructive lung disease.     COPD Symptoms:  Patient complains of a history of severe chronic dyspnea, chest tightness, inability to climb stairs, anxiety and cough productive of clear sputum in small amounts .  Patient is experiencing symptoms daily, and has been using her rescue inhaler/nebulizer Q6 as needed  per day. Symptoms show no change over time. Suspected triggers include emotional upset, exercise, hot, humid air.      Smoking Status: She reports that she has been smoking cigarettes. She says that a pack of cigarettes will last her a week.  She uses smokeless tobacco per H&P note.    Current treatment includes: long-acting beta agonist inhaler, and long acting muscarinic antagonist (Anoro). ED treatment for COPD symptoms:  No.  COPD-related hospitalization Yes - 2. Prior PFTs: No.  Prior pneumococcal vaccination: No.  Current pulmonologist:  No.       Current Pharmacological Treatment:     Current Home Treatments: Anoro Ellipta (Lama+Laba): QD, Albuterol Nebublizer/MDI: Q6 PRN, Home Oxygen:3L     GOLD Stage:  Requires PFT documentation  Group:    Current eosinophil count: 0.1  Recorded domestic exacerbations past 12 months:  2        Observed PIF:      Combination  ICS-LABA Inhaler Acceptable   Therapy  Device For Use   Salmeterol/fluticasone Advair  Diskus    Vilanterol/fluticasone  Breo  Ellipta    Formoterol/mometasone  Dulera MDI Yes   Formoterol/budesonide  Symbicort MDI Yes   Triple Therapy Recommended (For Group E) [id number]     Fluticasone/umeclidinium/vilanterol  Trelegy  Ellipta  
Repeat Na 120- unchanged   Will repeat 3% NS - continue with strict I&O along with q 6 hrs Na levels  Repeat Na next Na level post hypertonic solution.  Dr. Barrera updated.  
Repeat Na came back at 124, started on Urea powder 15g TID  Order placed to repeat labs at 1800,   Remove fluid restriction order.   
Repeat labs came back with Na of 117, discussed  with Holly Rai, plan to start 3% NaCl total of 60cc over 3 hours, repeat labs post infusion, continue strict I&Os, risk for over correction, Na goal is 123-125 by 9am tomorrow. Stop sodium tabs.   
Spiritual Care Assessment/Progress Note  [facility]    Name: Ronald Coffey MRN: [id number]    Age: 58 y.o.     Sex: female   Language: English     Date: 2024            Total Time Calculated: 30 min              Spiritual Assessment begun in [address]  Service Provided For:: Patient     Encounter Overview/Reason : Initial Encounter    Spiritual beliefs:      [] Involved in a caprice tradition/spiritual practice:      [] Supported by a caprice community:      [] Claims no spiritual orientation:      [] Seeking spiritual identity:           [x] Adheres to an individual form of spirituality:      [] Not able to assess:                Identified resources for coping and support system:   Support System: Friends/neighbors       [] Prayer                  [] Devotional reading               [] Music                  [] Guided Imagery     [] Pet visits                                        [] Other: (COMMENT)     Specific area/focus of visit   Encounter:    Crisis:    Spiritual/Emotional needs:    Ritual, Rites and Sacraments:    Grief, Loss, and Adjustments:    Ethics/Mediation:    Behavioral Health:    Palliative Care:    Advance Care Planning:      Plan/Referrals: Developed Care Plan (see consult note)    Narrative:  initiated visit to Ronald Coffey due to length of stay in [address]. Reviewed the patient's medical record prior to this encounter.     Assessment: Patient was experiencing spiritual concern as evidenced by her statement that she needed emotional support around her lack of housing.  Patient shared that her mother  last year, which precipitated her housing insecurity.  Provided active listening, a compassionate presence, and grief care.  Ms. Coffey shared that she has her own individual spirituality and does feel connected to the sacred. Provided assurance of prayer per patient request.  Patient hopes to increase her strength so she can do things that bring her jeni, like sitting 
  CREATININE 0.45* 0.65 0.48*   CALCIUM 8.0* 8.4* 8.9     Recent Labs     04/18/24  0432 04/17/24  0304 04/16/24  1240   WBC 9.7 7.8 8.1   HGB 9.2* 9.9* 10.5*   HCT 26.1* 28.2* 30.0*    243 256     No results for input(s): \"DARLENE\", \"KU\", \"CLU\", \"CREAU\" in the last 720 hours.    Invalid input(s): \"PROU\"  No results found for: \"SDES\"  No components found for: \"CULT\"  Recent Results (from the past 24 hour(s))   Basic Metabolic Panel    Collection Time: 04/17/24 10:50 AM   Result Value Ref Range    Sodium 120 (L) 136 - 145 mmol/L    Potassium 3.4 (L) 3.5 - 5.1 mmol/L    Chloride 82 (L) 97 - 108 mmol/L    CO2 33 (H) 21 - 32 mmol/L    Anion Gap 5 5 - 15 mmol/L    Glucose 123 (H) 65 - 100 mg/dL    BUN 6 6 - 20 MG/DL    Creatinine 0.48 (L) 0.55 - 1.02 MG/DL    Bun/Cre Ratio 13 12 - 20      Est, Glom Filt Rate >90 >60 ml/min/1.73m2    Calcium 8.9 8.5 - 10.1 MG/DL   Uric Acid    Collection Time: 04/17/24 10:50 AM   Result Value Ref Range    Uric Acid 1.5 (L) 2.6 - 6.0 MG/DL   Basic Metabolic Panel    Collection Time: 04/17/24  8:51 PM   Result Value Ref Range    Sodium 120 (L) 136 - 145 mmol/L    Potassium 3.7 3.5 - 5.1 mmol/L    Chloride 83 (L) 97 - 108 mmol/L    CO2 33 (H) 21 - 32 mmol/L    Anion Gap 4 (L) 5 - 15 mmol/L    Glucose 175 (H) 65 - 100 mg/dL    BUN 16 6 - 20 MG/DL    Creatinine 0.65 0.55 - 1.02 MG/DL    Bun/Cre Ratio 25 (H) 12 - 20      Est, Glom Filt Rate >90 >60 ml/min/1.73m2    Calcium 8.4 (L) 8.5 - 10.1 MG/DL   CBC    Collection Time: 04/18/24  4:32 AM   Result Value Ref Range    WBC 9.7 3.6 - 11.0 K/uL    RBC 2.91 (L) 3.80 - 5.20 M/uL    Hemoglobin 9.2 (L) 11.5 - 16.0 g/dL    Hematocrit 26.1 (L) 35.0 - 47.0 %    MCV 89.7 80.0 - 99.0 FL    MCH 31.6 26.0 - 34.0 PG    MCHC 35.2 30.0 - 36.5 g/dL    RDW 12.4 11.5 - 14.5 %    Platelets 231 150 - 400 K/uL    MPV 9.9 8.9 - 12.9 FL    Nucleated RBCs 0.0 0  WBC    nRBC 0.00 0.00 - 0.01 K/uL   Basic Metabolic Panel    Collection Time: 04/18/24  4:32 AM 
79*   CO2 32 33* 33*   BUN 9 7 8   CREATININE 0.40* 0.46* 0.38*   CALCIUM 8.5 8.7 8.7     Recent Labs     04/17/24  0304 04/16/24  1240 04/15/24  0350   WBC 7.8 8.1 6.8   HGB 9.9* 10.5* 11.1*   HCT 28.2* 30.0* 32.2*    256 289     No results for input(s): \"DARLENE\", \"KU\", \"CLU\", \"CREAU\" in the last 720 hours.    Invalid input(s): \"PROU\"  No results found for: \"SDES\"  No components found for: \"CULT\"  Recent Results (from the past 24 hour(s))   Osmolality, Urine    Collection Time: 04/16/24 12:40 PM   Result Value Ref Range    Osmolality, Ur 521 MOSM/kg H2O   Sodium, urine, random    Collection Time: 04/16/24 12:40 PM   Result Value Ref Range    SODIUM, RANDOM URINE 92 MMOL/L   Basic Metabolic Panel    Collection Time: 04/16/24 12:40 PM   Result Value Ref Range    Sodium 117 (LL) 136 - 145 mmol/L    Potassium 4.0 3.5 - 5.1 mmol/L    Chloride 79 (L) 97 - 108 mmol/L    CO2 33 (H) 21 - 32 mmol/L    Anion Gap 5 5 - 15 mmol/L    Glucose 117 (H) 65 - 100 mg/dL    BUN 7 6 - 20 MG/DL    Creatinine 0.46 (L) 0.55 - 1.02 MG/DL    Bun/Cre Ratio 15 12 - 20      Est, Glom Filt Rate >90 >60 ml/min/1.73m2    Calcium 8.7 8.5 - 10.1 MG/DL   CBC with Auto Differential    Collection Time: 04/16/24 12:40 PM   Result Value Ref Range    WBC 8.1 3.6 - 11.0 K/uL    RBC 3.39 (L) 3.80 - 5.20 M/uL    Hemoglobin 10.5 (L) 11.5 - 16.0 g/dL    Hematocrit 30.0 (L) 35.0 - 47.0 %    MCV 88.5 80.0 - 99.0 FL    MCH 31.0 26.0 - 34.0 PG    MCHC 35.0 30.0 - 36.5 g/dL    RDW 12.0 11.5 - 14.5 %    Platelets 256 150 - 400 K/uL    MPV 9.7 8.9 - 12.9 FL    Nucleated RBCs 0.0 0  WBC    nRBC 0.00 0.00 - 0.01 K/uL    Neutrophils % 88 (H) 32 - 75 %    Lymphocytes % 8 (L) 12 - 49 %    Monocytes % 4 (L) 5 - 13 %    Eosinophils % 0 0 - 7 %    Basophils % 0 0 - 1 %    Immature Granulocytes % 0 0.0 - 0.5 %    Neutrophils Absolute 7.2 1.8 - 8.0 K/UL    Lymphocytes Absolute 0.6 (L) 0.8 - 3.5 K/UL    Monocytes Absolute 0.3 0.0 - 1.0 K/UL    Eosinophils Absolute 
be very compliant or adherent with her medications.  She does cite concern about being able to afford her medications however these should be covered by her Medicaid.  She also reports concern about her disposition-case management was notified about social challenges during IDR rounds this morning.          Interval history / Subjective:   Follow up COPD exacerbation/hyponatremia   ...>126...>128 after the second dose of Tolvaptan on 4/22 while on high dose Urea tablets.  Discussed with nephrologist at bedside, plan to tighten FR,decrease Urea dose and monitor until Na is persistently >=130 with likely VIDAL Wednesday.        Assessment & Plan:     COPD Exacerbation  Chronic respiratory failure  - No acute process on chest x-ray  - Has productive cough, oxygen sats 96% on 2 L nasal cannula  - Patient is reportedly on 3 L nasal cannula PTA, [place] is her oxygen supplier  - Continue with oral steroids x 5 days  - Azithromycin 500 mg x 3 days  - DuoNebs and resume Anoro (or substitute)       Hyponatremia, acute on chronic  Likely sec to SIADH (patient on zoloft)  - s/p hypertonic saline 4/16  -Hypertonic saline again 4/17  -Sodium trended down after initial improvement with initiation of urea tablets, started on tolvaptan 15 mg x 1  4/20 and 4/21  -, monitor .    Hypokalemia: Replace and monitor.     Tachycardia--now resolved  Hx Htn: resume home medications  Hx Tobacco use: nicotine patch  Hx Alcohol Use without evidence of withdrawal.  - last drink last pm, beer, unknown quantity (~21 ounces?)  - monitor for withdrawal  - continue thiamine and folic acid     PT/OT    Regular diet       Code status: FULL CODE  Prophylaxis: Lovenox    Plan: Hypertonic saline again today. Monitor sodium levels  Care Plan discussed with: patient, RN, CM  Anticipated Disposition:  Nephrology recommended to see NA persistently =>130 befroe discharge,VIDAL Wednesday.  Inpatient  Cardiac monitoring: Remote Telemetry  Central Line:   
tender, non distended, BS physiological,   Ext: no clubbing, no cyanosis, no edema, brisk 2+ DP pulses  Neuro/Psych: pleasant mood and affect, CN 2-12 grossly intact, sensory grossly within normal limit, Strength 5/5 in all extremities, DTR 1+ x 4  Skin: warm     Data Review:    Review and/or order of clinical lab test      I have personally and independently reviewed all pertinent labs, diagnostic studies, imaging, and have provided independent interpretation of the same.     Labs:     Recent Labs     04/20/24  0337 04/21/24  0259   WBC 13.1* 9.7   HGB 11.6 11.0*   HCT 33.8* 33.5*    293       Recent Labs     04/20/24  1801 04/21/24  0259 04/21/24  1121   * 122* 124*   K 4.2 HEMOLYZED SPECIMEN 3.6   CL 88* 91* 90*   CO2 32 23 33*   BUN 48* 49* 50*       No results for input(s): \"ALT\", \"TP\", \"ALB\", \"GLOB\", \"GGT\", \"AML\" in the last 72 hours.    Invalid input(s): \"SGOT\", \"GPT\", \"AP\", \"TBIL\", \"TBILI\", \"AMYP\", \"LPSE\", \"HLPSE\"    No results for input(s): \"INR\", \"APTT\" in the last 72 hours.    Invalid input(s): \"PTP\"   No results for input(s): \"TIBC\", \"FERR\" in the last 72 hours.    Invalid input(s): \"FE\", \"PSAT\"     No results found for: \"FOL\", \"RBCF\"   No results for input(s): \"PH\", \"PCO2\", \"PO2\" in the last 72 hours.  No results for input(s): \"CPK\" in the last 72 hours.    Invalid input(s): \"CPKMB\", \"CKNDX\", \"TROIQ\"  Lab Results   Component Value Date/Time    CHOL 195 09/20/2021 01:30 PM    HDL 96 09/20/2021 01:30 PM     No results found for: \"GLUCPOC\"  [unfilled]    Notes reviewed from all clinical/nonclinical/nursing services involved in patient's clinical care. Care coordination discussions were held with appropriate clinical/nonclinical/ nursing providers based on care coordination needs.         Patients current active Medications were reviewed, considered, added and adjusted based on the clinical condition today.      Home Medications were reconciled to the best of my ability given all available 
    ______________________________________________________________________  EXPECTED LENGTH OF STAY: 6  ACTUAL LENGTH OF STAY:          2                 Douglas Zuleta MD

## 2024-05-30 ENCOUNTER — HOSPITAL ENCOUNTER (OUTPATIENT)
Facility: HOSPITAL | Age: 59
Setting detail: SPECIMEN
Discharge: HOME | End: 2024-06-02

## 2024-05-30 PROCEDURE — 36415 COLL VENOUS BLD VENIPUNCTURE: CPT

## 2024-05-30 PROCEDURE — 85025 COMPLETE CBC W/AUTO DIFF WBC: CPT

## 2024-05-30 PROCEDURE — 80053 COMPREHEN METABOLIC PANEL: CPT

## 2024-05-31 LAB
ALBUMIN SERPL-MCNC: 3.9 G/DL (ref 3.5–5)
ALBUMIN/GLOB SERPL: 1.3 (ref 1.1–2.2)
ALP SERPL-CCNC: 92 U/L (ref 45–117)
ALT SERPL-CCNC: 13 U/L (ref 12–78)
ANION GAP SERPL CALC-SCNC: 3 MMOL/L (ref 5–15)
AST SERPL-CCNC: 12 U/L (ref 15–37)
BASOPHILS # BLD: 0 K/UL (ref 0–0.1)
BASOPHILS NFR BLD: 1 % (ref 0–1)
BILIRUB SERPL-MCNC: 0.3 MG/DL (ref 0.2–1)
BUN SERPL-MCNC: 7 MG/DL (ref 6–20)
BUN/CREAT SERPL: 14 (ref 12–20)
CALCIUM SERPL-MCNC: 9.8 MG/DL (ref 8.5–10.1)
CHLORIDE SERPL-SCNC: 95 MMOL/L (ref 97–108)
CO2 SERPL-SCNC: 37 MMOL/L (ref 21–32)
CREAT SERPL-MCNC: 0.51 MG/DL (ref 0.55–1.02)
DIFFERENTIAL METHOD BLD: ABNORMAL
EOSINOPHIL # BLD: 0.3 K/UL (ref 0–0.4)
EOSINOPHIL NFR BLD: 3 % (ref 0–7)
ERYTHROCYTE [DISTWIDTH] IN BLOOD BY AUTOMATED COUNT: 13.2 % (ref 11.5–14.5)
GLOBULIN SER CALC-MCNC: 3.1 G/DL (ref 2–4)
GLUCOSE SERPL-MCNC: 116 MG/DL (ref 65–100)
HCT VFR BLD AUTO: 33.5 % (ref 35–47)
HGB BLD-MCNC: 11.1 G/DL (ref 11.5–16)
IMM GRANULOCYTES # BLD AUTO: 0 K/UL (ref 0–0.04)
IMM GRANULOCYTES NFR BLD AUTO: 0 % (ref 0–0.5)
LYMPHOCYTES # BLD: 2.1 K/UL (ref 0.8–3.5)
LYMPHOCYTES NFR BLD: 29 % (ref 12–49)
MCH RBC QN AUTO: 30.8 PG (ref 26–34)
MCHC RBC AUTO-ENTMCNC: 33.1 G/DL (ref 30–36.5)
MCV RBC AUTO: 93.1 FL (ref 80–99)
MONOCYTES # BLD: 0.6 K/UL (ref 0–1)
MONOCYTES NFR BLD: 8 % (ref 5–13)
NEUTS SEG # BLD: 4.2 K/UL (ref 1.8–8)
NEUTS SEG NFR BLD: 59 % (ref 32–75)
NRBC # BLD: 0 K/UL (ref 0–0.01)
NRBC BLD-RTO: 0 PER 100 WBC
PLATELET # BLD AUTO: 229 K/UL (ref 150–400)
PMV BLD AUTO: 11 FL (ref 8.9–12.9)
POTASSIUM SERPL-SCNC: 3.8 MMOL/L (ref 3.5–5.1)
PROT SERPL-MCNC: 7 G/DL (ref 6.4–8.2)
RBC # BLD AUTO: 3.6 M/UL (ref 3.8–5.2)
SODIUM SERPL-SCNC: 135 MMOL/L (ref 136–145)
WBC # BLD AUTO: 7.3 K/UL (ref 3.6–11)

## 2024-12-02 ENCOUNTER — HOSPITAL ENCOUNTER (OUTPATIENT)
Facility: HOSPITAL | Age: 59
Discharge: HOME | End: 2024-12-05
Attending: INTERNAL MEDICINE
Payer: MEDICAID

## 2024-12-02 DIAGNOSIS — J43.9 PULMONARY EMPHYSEMA, UNSPECIFIED EMPHYSEMA TYPE (HCC): ICD-10-CM

## 2024-12-02 PROCEDURE — 71250 CT THORAX DX C-: CPT

## 2025-08-14 ENCOUNTER — TRANSCRIBE ORDERS (OUTPATIENT)
Facility: HOSPITAL | Age: 60
End: 2025-08-14

## 2025-08-14 DIAGNOSIS — Z87.891 PERSONAL HISTORY OF TOBACCO USE: Primary | ICD-10-CM

## 2025-08-14 DIAGNOSIS — Z87.891 FORMER SMOKER: ICD-10-CM

## 2025-08-28 ENCOUNTER — HOSPITAL ENCOUNTER (OUTPATIENT)
Facility: HOSPITAL | Age: 60
Discharge: HOME | End: 2025-08-31
Attending: INTERNAL MEDICINE
Payer: MEDICAID

## 2025-08-28 DIAGNOSIS — Z87.891 FORMER SMOKER: ICD-10-CM

## 2025-08-28 DIAGNOSIS — Z87.891 PERSONAL HISTORY OF TOBACCO USE: ICD-10-CM

## 2025-08-28 PROCEDURE — 71271 CT THORAX LUNG CANCER SCR C-: CPT
